# Patient Record
Sex: FEMALE | Race: WHITE | Employment: STUDENT | URBAN - METROPOLITAN AREA
[De-identification: names, ages, dates, MRNs, and addresses within clinical notes are randomized per-mention and may not be internally consistent; named-entity substitution may affect disease eponyms.]

---

## 2021-01-04 ENCOUNTER — OFFICE VISIT (OUTPATIENT)
Dept: FAMILY MEDICINE CLINIC | Age: 26
End: 2021-01-04
Payer: COMMERCIAL

## 2021-01-04 VITALS
TEMPERATURE: 98.4 F | RESPIRATION RATE: 14 BRPM | HEART RATE: 84 BPM | BODY MASS INDEX: 18.35 KG/M2 | SYSTOLIC BLOOD PRESSURE: 92 MMHG | WEIGHT: 97.2 LBS | OXYGEN SATURATION: 98 % | DIASTOLIC BLOOD PRESSURE: 64 MMHG | HEIGHT: 61 IN

## 2021-01-04 DIAGNOSIS — F32.A DEPRESSION, UNSPECIFIED DEPRESSION TYPE: ICD-10-CM

## 2021-01-04 DIAGNOSIS — Z23 NEED FOR TETANUS, DIPHTHERIA, AND ACELLULAR PERTUSSIS (TDAP) VACCINE: ICD-10-CM

## 2021-01-04 DIAGNOSIS — E55.9 VITAMIN D DEFICIENCY: ICD-10-CM

## 2021-01-04 DIAGNOSIS — R06.83 SNORING: ICD-10-CM

## 2021-01-04 DIAGNOSIS — K21.9 GASTROESOPHAGEAL REFLUX DISEASE WITHOUT ESOPHAGITIS: ICD-10-CM

## 2021-01-04 DIAGNOSIS — Z30.9 ENCOUNTER FOR CONTRACEPTIVE MANAGEMENT, UNSPECIFIED TYPE: ICD-10-CM

## 2021-01-04 DIAGNOSIS — Z00.00 ROUTINE HEALTH MAINTENANCE: Primary | ICD-10-CM

## 2021-01-04 PROCEDURE — 99214 OFFICE O/P EST MOD 30 MIN: CPT | Performed by: STUDENT IN AN ORGANIZED HEALTH CARE EDUCATION/TRAINING PROGRAM

## 2021-01-04 PROCEDURE — G8427 DOCREV CUR MEDS BY ELIG CLIN: HCPCS | Performed by: STUDENT IN AN ORGANIZED HEALTH CARE EDUCATION/TRAINING PROGRAM

## 2021-01-04 PROCEDURE — 1036F TOBACCO NON-USER: CPT | Performed by: STUDENT IN AN ORGANIZED HEALTH CARE EDUCATION/TRAINING PROGRAM

## 2021-01-04 PROCEDURE — G8419 CALC BMI OUT NRM PARAM NOF/U: HCPCS | Performed by: STUDENT IN AN ORGANIZED HEALTH CARE EDUCATION/TRAINING PROGRAM

## 2021-01-04 PROCEDURE — G8482 FLU IMMUNIZE ORDER/ADMIN: HCPCS | Performed by: STUDENT IN AN ORGANIZED HEALTH CARE EDUCATION/TRAINING PROGRAM

## 2021-01-04 PROCEDURE — 90715 TDAP VACCINE 7 YRS/> IM: CPT | Performed by: STUDENT IN AN ORGANIZED HEALTH CARE EDUCATION/TRAINING PROGRAM

## 2021-01-04 RX ORDER — MEDROXYPROGESTERONE ACETATE 150 MG/ML
150 INJECTION, SUSPENSION INTRAMUSCULAR ONCE
Qty: 1 ML | Refills: 3 | Status: SHIPPED
Start: 2021-01-04 | End: 2021-01-06 | Stop reason: CLARIF

## 2021-01-04 RX ORDER — ESOMEPRAZOLE MAGNESIUM 40 MG/1
40 FOR SUSPENSION ORAL DAILY
COMMUNITY
End: 2021-01-04

## 2021-01-04 RX ORDER — BUPROPION HYDROCHLORIDE 150 MG/1
150 TABLET ORAL EVERY MORNING
COMMUNITY

## 2021-01-04 RX ORDER — DULOXETIN HYDROCHLORIDE 20 MG/1
20 CAPSULE, DELAYED RELEASE ORAL DAILY
COMMUNITY
End: 2021-02-15 | Stop reason: ALTCHOICE

## 2021-01-04 RX ORDER — ESOMEPRAZOLE MAGNESIUM 40 MG/1
CAPSULE, DELAYED RELEASE ORAL
COMMUNITY
Start: 2020-12-15 | End: 2021-12-14 | Stop reason: ALTCHOICE

## 2021-01-04 RX ORDER — ERGOCALCIFEROL 1.25 MG/1
CAPSULE ORAL
COMMUNITY
Start: 2020-12-09 | End: 2021-07-06 | Stop reason: SDUPTHER

## 2021-01-04 SDOH — HEALTH STABILITY: MENTAL HEALTH: HOW MANY STANDARD DRINKS CONTAINING ALCOHOL DO YOU HAVE ON A TYPICAL DAY?: NOT ASKED

## 2021-01-04 SDOH — HEALTH STABILITY: MENTAL HEALTH: HOW OFTEN DO YOU HAVE A DRINK CONTAINING ALCOHOL?: NEVER

## 2021-01-04 ASSESSMENT — PATIENT HEALTH QUESTIONNAIRE - PHQ9
2. FEELING DOWN, DEPRESSED OR HOPELESS: 1
SUM OF ALL RESPONSES TO PHQ QUESTIONS 1-9: 2
SUM OF ALL RESPONSES TO PHQ QUESTIONS 1-9: 2

## 2021-01-04 ASSESSMENT — ENCOUNTER SYMPTOMS
SORE THROAT: 0
ABDOMINAL PAIN: 0
SHORTNESS OF BREATH: 0
DIARRHEA: 0
BACK PAIN: 0
EYE PAIN: 0
VOMITING: 0
SINUS PRESSURE: 1
NAUSEA: 0
CONSTIPATION: 0
RHINORRHEA: 0
EYE REDNESS: 0
SINUS PAIN: 1
COUGH: 0

## 2021-01-04 NOTE — PROGRESS NOTES
2021    Svetlana Bae (:  1995) is a 26 y.o. female, here for evaluation of the following medical concerns:    Patient is a 26year old female here today to establish care with myself. She recently moved here from [place] and [place]. She was in [place] before [place]. She moved here for friends. She has history of anxiety and depression and GERD and vitamin D deficiency. She Is on wellbutrin, cymbalta, and esomeprazole. She would like to have a referral to an OBGYN for birth control. She would like to go back on the depo shot. We discussed that we can do it here. She recently had a pap smear 2019. She was told 3 years she needed it again. She has no surgical history and no allergies. She does not believe she needs any refills on medications. Patient denies CP, SOB, nausea, vomiting, diarrhea, fevers chills sweats, abdominal pain, numbness tingling, dizziness, lightheadedness, back pain, ear pain, eye pain, nasal congestion, headaches, blurry vision. Patient would like to see a doctor that prescribes hormones and does gender transition. Discussed [facility]. Patient would like a sleep study as she has told she snores and gasps for air.      [place] sleepiness scale  Chance of dozin-Never  1-slight  2-moderate  3-high    Situation        Chance of dozing  Sitting and reading         1  Watching TV          1  Sitting inactive in a public place       0  As a passenger in a car for an hour without a break     1  Lying down to rest in the afternoon when circumstances permit   2  Sitting and talking to someone       0  Sitting quietly after a lunch without alcohol      1  In a car, while stopped for a few minutes in traffic     0           Score  6    Neck circumference: 10.5    Symptoms or Complaints:       Yes/No  Have you been witnessed not breathing while sleeping    yes  Are you excessively tired during the day      yes Do you wake up at night gasping for breath     yes  Have you been told that you snore       yes  Do you experience restless sleep        yes  Do you wake up in the morning with a headache or unrefreshed  no  Do you have trouble with your memory or your concentration   yes  Do you experience fatigue        yes  Do you drive drowsy or experience near car accidents due to being tired n/a  Do you struggle to stay awake during the daytime    yes  Do you experience difficulty with work performance due to sleepiness yes    Chief Complaint   Patient presents with    New Patient     est pcp (moved from [place]) / requesting referral GYN       Review of Systems   Constitutional: Positive for fatigue. Negative for chills and fever. HENT: Positive for sinus pressure and sinus pain. Negative for congestion, ear pain, postnasal drip, rhinorrhea and sore throat. Eyes: Negative for pain and redness. Respiratory: Negative for cough and shortness of breath. Cardiovascular: Negative for chest pain. Gastrointestinal: Negative for abdominal pain, constipation, diarrhea, nausea and vomiting. Genitourinary: Negative for difficulty urinating and dysuria. Musculoskeletal: Negative for back pain, myalgias and neck pain. Skin: Negative for rash. Neurological: Negative for dizziness, light-headedness and numbness. Psychiatric/Behavioral: The patient is not nervous/anxious. Prior to Visit Medications    Medication Sig Taking?  Authorizing Provider   esomeprazole ([address]) 40 MG delayed release capsule TAKE 1 CAPSULE BY MOUTH EVERY DAY IN THE MORNING BEFORE BREAKFAST WITH WATER Yes Historical Provider, MD   DULoxetine (CYMBALTA) 20 MG extended release capsule Take 20 mg by mouth daily Yes Historical Provider, MD   buPROPion (WELLBUTRIN XL) 150 MG extended release tablet Take 150 mg by mouth every morning Yes Historical Provider, MD  High Blood Pressure Father     Depression Maternal Uncle     Asthma Paternal Aunt     High Blood Pressure Maternal Grandmother     Hypothyroidism Maternal Grandmother     Cancer Maternal Grandfather     Stroke Paternal Grandmother     Heart Attack Paternal Grandfather     Irritable Bowel Syndrome Sister            Vitals:    01/04/21 1405   BP: 92/64   Site: Left Upper Arm   Position: Sitting   Cuff Size: Small Adult   Pulse: 84   Resp: 14   Temp: 98.4 °F (36.9 °C)   TempSrc: Temporal   SpO2: 98%   Weight: 97 lb 3.2 oz (44.1 kg)   Height: 5' 1\" (1.549 m)     Estimated body mass index is 18.37 kg/m² as calculated from the following:    Height as of this encounter: 5' 1\" (1.549 m). Weight as of this encounter: 97 lb 3.2 oz (44.1 kg). Physical Exam  Vitals signs reviewed. Constitutional:       Appearance: Normal appearance. Comments: Thin appearing   HENT:      Head: Normocephalic and atraumatic. Right Ear: Tympanic membrane, ear canal and external ear normal.      Left Ear: Tympanic membrane, ear canal and external ear normal.      Nose: Nose normal.      Mouth/Throat:      Mouth: Mucous membranes are moist.      Pharynx: Oropharynx is clear. Eyes:      Extraocular Movements: Extraocular movements intact. Conjunctiva/sclera: Conjunctivae normal.      Pupils: Pupils are equal, round, and reactive to light. Neck:      Musculoskeletal: Normal range of motion and neck supple. Cardiovascular:      Rate and Rhythm: Normal rate and regular rhythm. Pulses: Normal pulses. Heart sounds: Normal heart sounds. Pulmonary:      Effort: Pulmonary effort is normal.      Breath sounds: Normal breath sounds. Abdominal:      General: Bowel sounds are normal.      Palpations: Abdomen is soft. Musculoskeletal: Normal range of motion. Skin:     General: Skin is warm and dry. Capillary Refill: Capillary refill takes less than 2 seconds.    Neurological: Reviewed age and gender appropriate health screening exams and vaccinations. Advised patient regarding importance of keeping up with recommended health maintenance and to schedule as soon as possible if overdue, as this is important in assessing for undiagnosed pathology, especially cancer, as well as protecting against potentially harmful/life threatening disease. Patient and/or guardian verbalizes understanding and agrees with above counseling, assessment and plan. All questions answered. Return in about 6 months (around 7/4/2021) for annual.    An  electronic signature was used to authenticate this note.     --Saroj Pittman, DO on 1/4/2021 at 2:48 PM

## 2021-01-04 NOTE — PATIENT INSTRUCTIONS
· The shot doesn't protect against sexually transmitted infections (STIs), such as herpes or HIV/AIDS. If you aren't sure if your sex partner might have an STI, use a condom to protect against disease. · The shot may cause bone loss in some women. Talk to your doctor about the risks and benefits. · The shot is needed every 3 months. Any side effects may last 3 months or longer. ? The shot may cause irregular periods, or you may have spotting between periods. You may also stop getting a period. Some women see having no period as an advantage. ? It may cause mood changes, less interest in sex, or weight gain. · If you want to get pregnant, it may take up to 18 months after you stop getting the shot. This is because the hormones the shot provided have to leave your system, and your body has to readjust.  Where can you learn more? Go to https://chelieser.healthePropertyData. org and sign in to your [employer] Network account. Enter S801 in the BeHome247 box to learn more about \"Learning About Birth Control: The Shot. \"     If you do not have an account, please click on the \"Sign Up Now\" link. Current as of: February 11, 2020               Content Version: 12.6  © 2006-2020 [employer]. Care instructions adapted under license by [facility] ([facility]). If you have questions about a medical condition or this instruction, always ask your healthcare professional. [place] [address] any warranty or liability for your use of this information. Patient Education        Sleep Studies: About This Test  What is it? Sleep studies are tests that watch what happens to your body during sleep. These studies usually are done in a sleep lab. Sleep labs are often located in hospitals. Sleep studies you do at home can be done with portable equipment. But they may not give the same results as a sleep lab. Why is this test done? Sleep studies are done for people who say that sleep isn't restful or that they are tired all day. These studies can help find sleep problems, such as:  · Sleep apnea. This means that an adult regularly stops breathing during sleep for 10 seconds or longer. · Excessive snoring. · Problems staying awake, such as narcolepsy. · Problems with nighttime behaviors. These include sleepwalking, night terrors, bed-wetting, and REM behavior disorders (RBD). · Conditions such as periodic limb movement disorder. This is repeated muscle twitching of the feet, arms, or legs during sleep. · Seizures that occur at night (nocturnal seizures). How do you prepare for the test?  · You may be asked to keep a sleep diary for 1 to 2 weeks before your sleep study. · Don't take any naps for 2 to 3 days before your test.  · You may be asked to avoid food or drinks with caffeine for a day or two before your test.  · Take a shower or bath before your test, but don't use sprays, oils, or gels on your hair. Don't wear makeup, fingernail polish, or fake nails. · Pack and take along a small overnight bag with personal items, such as a toothbrush, a comb, favorite pillows or blankets, and a book. You can wear your own nightclothes. · If you will have portable sleep monitoring, your doctor will explain how to use the equipment at home. How is the test done? · In the sleep lab, you will be in a private room, much like a hotel room. · Small pads or patches called electrodes will be placed on your head and body with a small amount of glue and tape. These will record things like brain activity, eye movement, oxygen levels, and snoring. · Soft elastic belts will be placed around your chest and belly to measure your breathing. · Your blood oxygen levels will be checked by a small clip (oximeter) placed either on the tip of your index finger or on your earlobe. · If you have sleep apnea, you may wear a mask that is connected to a continuous positive airway pressure (CPAP) machine. · Depending on the type of test, you will be allowed to sleep through the night or you'll be awakened periodically and asked to stay awake for a while. · If you use portable sleep monitoring, follow the instructions your doctor gave you. How long does the test take? · You will stay in the sleep lab overnight. For some tests, you will also stay part of the next day. What happens after the test?  · You will be able to go home right away. · You may not sleep well during the test and may be tired the next day. · You can go back to your usual activities right away. · After your sleep problem has been identified, you may need a second study if your doctor orders treatment such as CPAP. Follow-up care is a key part of your treatment and safety. Be sure to make and go to all appointments, and call your doctor if you are having problems. It's also a good idea to keep a list of the medicines you take. Ask your doctor when you can expect to have your test results. Where can you learn more? Go to https://Symphony Dynamo.[employer]aro Systems. org and sign in to your SmartestK12 account. Enter D718 in the Flimmer box to learn more about \"Sleep Studies: About This Test.\"     If you do not have an account, please click on the \"Sign Up Now\" link. Current as of: February 24, 2020               Content Version: 12.6  © 2006-2020 [employer]. Care instructions adapted under license by [facility] [employer] [facility] ([facility]). If you have questions about a medical condition or this instruction, always ask your healthcare professional. [place] [address] any warranty or liability for your use of this information.

## 2021-01-06 ENCOUNTER — TELEPHONE (OUTPATIENT)
Dept: FAMILY MEDICINE CLINIC | Age: 26
End: 2021-01-06

## 2021-01-06 ENCOUNTER — NURSE ONLY (OUTPATIENT)
Dept: FAMILY MEDICINE CLINIC | Age: 26
End: 2021-01-06
Payer: COMMERCIAL

## 2021-01-06 DIAGNOSIS — Z30.42 DEPO-PROVERA CONTRACEPTIVE STATUS: Primary | ICD-10-CM

## 2021-01-06 DIAGNOSIS — R06.83 SNORING: Primary | ICD-10-CM

## 2021-01-06 LAB
CONTROL: YES
PREGNANCY TEST URINE, POC: NEGATIVE

## 2021-01-06 PROCEDURE — 81025 URINE PREGNANCY TEST: CPT | Performed by: STUDENT IN AN ORGANIZED HEALTH CARE EDUCATION/TRAINING PROGRAM

## 2021-01-06 RX ORDER — MEDROXYPROGESTERONE ACETATE 150 MG/ML
150 INJECTION, SUSPENSION INTRAMUSCULAR ONCE
Status: COMPLETED | OUTPATIENT
Start: 2021-01-06 | End: 2021-01-06

## 2021-01-06 RX ADMIN — MEDROXYPROGESTERONE ACETATE 150 MG: 150 INJECTION, SUSPENSION INTRAMUSCULAR at 11:03

## 2021-01-06 NOTE — TELEPHONE ENCOUNTER
Need a diagnostic sleep study for her not the one you did.   She has never had a sleep study done so Fabrice Lopez from the sleep center said she needs diagnostic one    Thanks

## 2021-01-06 NOTE — TELEPHONE ENCOUNTER
I put in an order for a different sleep study. Please let me know if this is not the correct order.  Thank you

## 2021-01-14 ENCOUNTER — HOSPITAL ENCOUNTER (OUTPATIENT)
Dept: SLEEP CENTER | Age: 26
Discharge: HOME | End: 2021-01-14
Payer: COMMERCIAL

## 2021-01-14 DIAGNOSIS — R06.83 SNORING: ICD-10-CM

## 2021-01-14 PROCEDURE — 95810 POLYSOM 6/> YRS 4/> PARAM: CPT

## 2021-01-15 VITALS
HEART RATE: 74 BPM | DIASTOLIC BLOOD PRESSURE: 68 MMHG | TEMPERATURE: 96.8 F | SYSTOLIC BLOOD PRESSURE: 99 MMHG | OXYGEN SATURATION: 97 % | BODY MASS INDEX: 18.31 KG/M2 | HEIGHT: 61 IN | WEIGHT: 97 LBS

## 2021-01-15 ASSESSMENT — SLEEP AND FATIGUE QUESTIONNAIRES
HOW LIKELY ARE YOU TO NOD OFF OR FALL ASLEEP WHILE LYING DOWN TO REST IN THE AFTERNOON WHEN CIRCUMSTANCES PERMIT: 3
HOW LIKELY ARE YOU TO NOD OFF OR FALL ASLEEP WHILE SITTING AND READING: 2
HOW LIKELY ARE YOU TO NOD OFF OR FALL ASLEEP WHILE WATCHING TV: 1
HOW LIKELY ARE YOU TO NOD OFF OR FALL ASLEEP WHILE SITTING QUIETLY AFTER LUNCH WITHOUT ALCOHOL: 0
ESS TOTAL SCORE: 9
HOW LIKELY ARE YOU TO NOD OFF OR FALL ASLEEP IN A CAR, WHILE STOPPED FOR A FEW MINUTES IN TRAFFIC: 0
HOW LIKELY ARE YOU TO NOD OFF OR FALL ASLEEP WHEN YOU ARE A PASSENGER IN A CAR FOR AN HOUR WITHOUT A BREAK: 2
HOW LIKELY ARE YOU TO NOD OFF OR FALL ASLEEP WHILE SITTING INACTIVE IN A PUBLIC PLACE: 1

## 2021-01-16 NOTE — PROGRESS NOTES
minute. Maximum heart rate during sleep was 91 beats per minute and  minimum heart rate during sleep was 59 beats per minute. The patient  was in sinus rhythm throughout. MISCELLANEOUS:  [place] Sleepiness Scale score is 9/24. There was no  snoring or bruxism noted. IMPRESSION:  1. No evidence of sleep apnea. 2.  No snoring. 3.  Excellent oxygen saturation. 4.  No cardiac dysrhythmia. DISCUSSION:  This patient has an apnea/hypopnea index of less than one. Normal is less than five, leaving this patient in the normal category. Along with this, there was excellent oxygen saturation, no snoring, and  no cardiac dysrhythmia. Based on the results of this study, treatment  is not indicated. SUGGESTIONS:  1.  Dr. Jessie Mckinney to discuss the results of study with the patient. 2.  No indication to treat for sleep apnea.         Nima Bonner MD  Diplomat of Sleep Medicine    D: 01/15/2021 16:49:52       T: 01/15/2021 16:59:05     [email or web address]  Job#: [id number]     Doc#: [id number]    CC:

## 2021-02-15 ENCOUNTER — OFFICE VISIT (OUTPATIENT)
Dept: FAMILY MEDICINE CLINIC | Age: 26
End: 2021-02-15
Payer: COMMERCIAL

## 2021-02-15 VITALS
RESPIRATION RATE: 16 BRPM | TEMPERATURE: 97.6 F | BODY MASS INDEX: 19.44 KG/M2 | SYSTOLIC BLOOD PRESSURE: 100 MMHG | WEIGHT: 99 LBS | OXYGEN SATURATION: 99 % | DIASTOLIC BLOOD PRESSURE: 58 MMHG | HEIGHT: 60 IN | HEART RATE: 80 BPM

## 2021-02-15 DIAGNOSIS — E55.9 VITAMIN D DEFICIENCY: ICD-10-CM

## 2021-02-15 DIAGNOSIS — R25.1 TREMOR: Primary | ICD-10-CM

## 2021-02-15 PROCEDURE — 1036F TOBACCO NON-USER: CPT | Performed by: STUDENT IN AN ORGANIZED HEALTH CARE EDUCATION/TRAINING PROGRAM

## 2021-02-15 PROCEDURE — G8420 CALC BMI NORM PARAMETERS: HCPCS | Performed by: STUDENT IN AN ORGANIZED HEALTH CARE EDUCATION/TRAINING PROGRAM

## 2021-02-15 PROCEDURE — 99213 OFFICE O/P EST LOW 20 MIN: CPT | Performed by: STUDENT IN AN ORGANIZED HEALTH CARE EDUCATION/TRAINING PROGRAM

## 2021-02-15 PROCEDURE — G8482 FLU IMMUNIZE ORDER/ADMIN: HCPCS | Performed by: STUDENT IN AN ORGANIZED HEALTH CARE EDUCATION/TRAINING PROGRAM

## 2021-02-15 PROCEDURE — G8427 DOCREV CUR MEDS BY ELIG CLIN: HCPCS | Performed by: STUDENT IN AN ORGANIZED HEALTH CARE EDUCATION/TRAINING PROGRAM

## 2021-02-15 RX ORDER — PROPRANOLOL HYDROCHLORIDE 20 MG/1
20 TABLET ORAL DAILY
Qty: 30 TABLET | Refills: 3 | Status: SHIPPED
Start: 2021-02-15 | End: 2021-04-12 | Stop reason: DRUGHIGH

## 2021-02-15 RX ORDER — PAROXETINE 10 MG/1
1 TABLET, FILM COATED ORAL DAILY
COMMUNITY
Start: 2021-01-19 | End: 2021-11-23 | Stop reason: SDUPTHER

## 2021-02-15 RX ORDER — MEDROXYPROGESTERONE ACETATE 150 MG/ML
150 INJECTION, SUSPENSION INTRAMUSCULAR
COMMUNITY
End: 2021-07-06 | Stop reason: ALTCHOICE

## 2021-02-15 ASSESSMENT — ENCOUNTER SYMPTOMS
DIARRHEA: 0
SINUS PRESSURE: 0
SHORTNESS OF BREATH: 0
NAUSEA: 0
ABDOMINAL PAIN: 0
CONSTIPATION: 0
EYE REDNESS: 0
RHINORRHEA: 0
COUGH: 0
SORE THROAT: 0
SINUS PAIN: 0
EYE PAIN: 0
VOMITING: 0
BACK PAIN: 0

## 2021-02-15 NOTE — PROGRESS NOTES
2/15/2021    Guadalupe Mulligan (:  1995) is a 26 y.o. female, here for evaluation of the following medical concerns:    HPI  Chief Complaint   Patient presents with   [facility]     wants to see about starting Propranolol for tremors     Tremor  She complains of tremor. Tremor primarily involves the bilateral hand. Onset of symptoms was gradual, starting about 8 years ago. Symptoms are currently of moderate severity. Tremor exacerbated by nothing. Tremor is alleviated by propranolol. Symptoms occur all day and last hours. She denies unilateral hand tremor, voice change, sleep disturbance, drooling during sleep (wet pillows), rigidity, postural changes and difficulty in initiating movement. Review of Systems   Constitutional: Negative for chills, fatigue and fever. HENT: Negative for congestion, ear pain, postnasal drip, rhinorrhea, sinus pressure, sinus pain and sore throat. Eyes: Negative for pain and redness. Respiratory: Negative for cough and shortness of breath. Cardiovascular: Negative for chest pain. Gastrointestinal: Negative for abdominal pain, constipation, diarrhea, nausea and vomiting. Genitourinary: Negative for difficulty urinating and dysuria. Musculoskeletal: Negative for back pain, myalgias and neck pain. Skin: Negative for rash. Neurological: Positive for tremors (hands). Negative for dizziness, light-headedness and numbness. Psychiatric/Behavioral: The patient is not nervous/anxious. Prior to Visit Medications    Medication Sig Taking?  Authorizing Provider   PARoxetine (PAXIL) 10 MG tablet Take 1 tablet by mouth daily Yes Historical Provider, MD   medroxyPROGESTERone (DEPO-PROVERA) 150 MG/ML injection Inject 150 mg into the muscle every 3 months Yes Historical Provider, MD   propranolol (INDERAL) 20 MG tablet Take 1 tablet by mouth daily Yes Michelle Noel DO   esomeprazole (NEXIUM) 40 MG delayed release capsule TAKE 1 CAPSULE BY MOUTH EVERY DAY IN THE MORNING BEFORE BREAKFAST WITH WATER Yes Historical Provider, MD   buPROPion (WELLBUTRIN XL) 150 MG extended release tablet Take 150 mg by mouth every morning Yes Historical Provider, MD   vitamin D (ERGOCALCIFEROL) 1.25 MG (81638 UT) CAPS capsule TAKE ONE CAPSULE ONE TIME A WEEK Yes Historical Provider, MD        No Known Allergies    Past Medical History:   Diagnosis Date    Acid reflux     Anxiety     Depression     Vitamin D deficiency        History reviewed. No pertinent surgical history.     Social History     Socioeconomic History    Marital status: Single     Spouse name: Not on file    Number of children: Not on file    Years of education: Not on file    Highest education level: Not on file   Occupational History    Not on file   Social Needs    Financial resource strain: Not on file    Food insecurity     Worry: Not on file     Inability: Not on file    Transportation needs     Medical: Not on file     Non-medical: Not on file   Tobacco Use    Smoking status: Never Smoker    Smokeless tobacco: Never Used   Substance and Sexual Activity    Alcohol use: Never     Frequency: Never     Binge frequency: Never    Drug use: Not on file     Comment: last used in September 2020    Sexual activity: Yes   Lifestyle    Physical activity     Days per week: Not on file     Minutes per session: Not on file    Stress: Not on file   Relationships    Social connections     Talks on phone: Not on file     Gets together: Not on file     Attends Rastafari service: Not on file     Active member of club or organization: Not on file     Attends meetings of clubs or organizations: Not on file     Relationship status: Not on file    Intimate partner violence     Fear of current or ex partner: Not on file     Emotionally abused: Not on file     Physically abused: Not on file     Forced sexual activity: Not on file   Other Topics Concern    Not on file   Social History Narrative    Not on file        Family History   Problem Relation Age of Onset    Hypothyroidism Mother     High Blood Pressure Father     Depression Maternal Uncle     Asthma Paternal Aunt     High Blood Pressure Maternal Grandmother     Hypothyroidism Maternal Grandmother     Cancer Maternal Grandfather     Stroke Paternal Grandmother     Heart Attack Paternal Grandfather     Irritable Bowel Syndrome Sister            Vitals:    02/15/21 1448   BP: (!) 100/58   Pulse: 80   Resp: 16   Temp: 97.6 °F (36.4 °C)   TempSrc: Infrared   SpO2: 99%   Weight: 99 lb (44.9 kg)   Height: 5' (1.524 m)     Estimated body mass index is 19.33 kg/m² as calculated from the following:    Height as of this encounter: 5' (1.524 m). Weight as of this encounter: 99 lb (44.9 kg). Physical Exam  Vitals signs and nursing note reviewed. Constitutional:       Appearance: Normal appearance. HENT:      Head: Normocephalic and atraumatic. Right Ear: Tympanic membrane, ear canal and external ear normal.      Left Ear: Tympanic membrane, ear canal and external ear normal.      Nose: Nose normal.      Mouth/Throat:      Mouth: Mucous membranes are moist.      Pharynx: Oropharynx is clear. Eyes:      Extraocular Movements: Extraocular movements intact. Conjunctiva/sclera: Conjunctivae normal.      Pupils: Pupils are equal, round, and reactive to light. Neck:      Musculoskeletal: Normal range of motion and neck supple. Cardiovascular:      Rate and Rhythm: Normal rate and regular rhythm. Pulses: Normal pulses. Heart sounds: Normal heart sounds. Pulmonary:      Effort: Pulmonary effort is normal.      Breath sounds: Normal breath sounds. Abdominal:      General: Bowel sounds are normal.      Palpations: Abdomen is soft. Musculoskeletal: Normal range of motion. Skin:     General: Skin is warm and dry. Capillary Refill: Capillary refill takes less than 2 seconds. Neurological:      General: No focal deficit present.       Mental Status: She is alert and oriented to person, place, and time. Comments: Tremor noted in hands   Psychiatric:         Mood and Affect: Mood normal.         Behavior: Behavior normal.         Thought Content: Thought content normal.         ASSESSMENT/PLAN:  Ruth Montenegro was seen today for check-up. Diagnoses and all orders for this visit:    Tremor  -     propranolol (INDERAL) 20 MG tablet; Take 1 tablet by mouth daily    Vitamin D deficiency  -     Vitamin D 25 Hydroxy; Future           Educational materials and/or home exercises printed for patient's review and were included in patient instructions on his/her After Visit Summary and given to patient at the end of visit. Counseled regarding above diagnosis, including possible risks and complications,  especially if left uncontrolled. Counseled regarding the possible side effects, risks, benefits and alternatives to treatment; patient and/or guardian verbalizes understanding, agrees, feels comfortable with and wishes to proceed with above treatment plan. Advised patient to call with any new medication issues, and read all Rx info from pharmacy to assure aware of all possible risks and side effects of medication before taking. Reviewed age and gender appropriate health screening exams and vaccinations. Advised patient regarding importance of keeping up with recommended health maintenance and to schedule as soon as possible if overdue, as this is important in assessing for undiagnosed pathology, especially cancer, as well as protecting against potentially harmful/life threatening disease. Patient and/or guardian verbalizes understanding and agrees with above counseling, assessment and plan. All questions answered. Return in about 4 weeks (around 3/15/2021) for med follow up. An  electronic signature was used to authenticate this note.     --Javed Bradley, DO on 2/15/2021 at 3:03 PM

## 2021-03-02 ENCOUNTER — OFFICE VISIT (OUTPATIENT)
Dept: FAMILY MEDICINE CLINIC | Age: 26
End: 2021-03-02
Payer: COMMERCIAL

## 2021-03-02 VITALS
BODY MASS INDEX: 19.39 KG/M2 | HEART RATE: 64 BPM | DIASTOLIC BLOOD PRESSURE: 58 MMHG | WEIGHT: 98.8 LBS | TEMPERATURE: 98.4 F | SYSTOLIC BLOOD PRESSURE: 96 MMHG | RESPIRATION RATE: 16 BRPM | OXYGEN SATURATION: 98 % | HEIGHT: 60 IN

## 2021-03-02 DIAGNOSIS — R61 HYPERHIDROSIS: Primary | ICD-10-CM

## 2021-03-02 DIAGNOSIS — R61 NIGHT SWEATS: ICD-10-CM

## 2021-03-02 PROCEDURE — 99213 OFFICE O/P EST LOW 20 MIN: CPT | Performed by: STUDENT IN AN ORGANIZED HEALTH CARE EDUCATION/TRAINING PROGRAM

## 2021-03-02 PROCEDURE — G8482 FLU IMMUNIZE ORDER/ADMIN: HCPCS | Performed by: STUDENT IN AN ORGANIZED HEALTH CARE EDUCATION/TRAINING PROGRAM

## 2021-03-02 PROCEDURE — 1036F TOBACCO NON-USER: CPT | Performed by: STUDENT IN AN ORGANIZED HEALTH CARE EDUCATION/TRAINING PROGRAM

## 2021-03-02 PROCEDURE — G8420 CALC BMI NORM PARAMETERS: HCPCS | Performed by: STUDENT IN AN ORGANIZED HEALTH CARE EDUCATION/TRAINING PROGRAM

## 2021-03-02 PROCEDURE — G8427 DOCREV CUR MEDS BY ELIG CLIN: HCPCS | Performed by: STUDENT IN AN ORGANIZED HEALTH CARE EDUCATION/TRAINING PROGRAM

## 2021-03-02 ASSESSMENT — ENCOUNTER SYMPTOMS
EYE REDNESS: 0
RHINORRHEA: 0
SINUS PRESSURE: 0
EYE PAIN: 0
NAUSEA: 0
BACK PAIN: 0
VOMITING: 0
SINUS PAIN: 0
ABDOMINAL PAIN: 0
COUGH: 0
CONSTIPATION: 0
DIARRHEA: 0
SHORTNESS OF BREATH: 0
SORE THROAT: 0

## 2021-03-02 NOTE — PROGRESS NOTES
3/2/2021    Ara Horton (:  1995) is a 26 y.o. adult, here for evaluation of the following medical concerns:     Patient is here for history of night sweats for several years. She states she got them at least when she was around 26years old. She does not remember having them earlier than that. She states that she was worked up for thyroid at that time. She did have a lot of weight loss around a year or so ago and was worked up extensively for this. This included a thyroid and other lab work and an endoscopy. It did not include a colonoscopy. she has no concerns for HIV or tuberculosis or hepatitis. She is with 1 partner and has been with the same partner for the last 5 years. She started her Paxil and her Wellbutrin after the night sweats started so unlikely to be causing the night sweats. She is on the Depo but again the night sweats started before the Depo was started. She is going to the [facility] clinic on the  to discuss transgender reassignment. Her girlfriend is transgender as well but is more hesitant to do the change than she is. Usually the sweats are minimal.  However she states that sometimes she does have to change her clothes and that her underwear feels \"Spotty\". Sometimes she does wake up drenched. She states that she wanted to get it under control before going to the [facility] clinic on the  and getting more blood work done and starting testosterone. Patient denies CP, SOB, nausea, vomiting, diarrhea, fevers chills sweats, abdominal pain, numbness tingling, dizziness, lightheadedness, back pain, ear pain, eye pain, nasal congestion, headaches, blurry vision. Chief Complaint   Patient presents with    Sweats     night sweats for several years        Review of Systems   Constitutional: Negative for chills, fatigue and fever. HENT: Negative for congestion, ear pain, postnasal drip, rhinorrhea, sinus pressure, sinus pain and sore throat. Eyes: Negative for pain and redness. Respiratory: Negative for cough and shortness of breath. Cardiovascular: Negative for chest pain. Gastrointestinal: Negative for abdominal pain, constipation, diarrhea, nausea and vomiting. Endocrine:        Night sweats+   Genitourinary: Negative for difficulty urinating and dysuria. Musculoskeletal: Negative for back pain, myalgias and neck pain. Skin: Negative for rash. Neurological: Positive for tremors. Negative for dizziness, light-headedness and numbness. Psychiatric/Behavioral: The patient is not nervous/anxious. Prior to Visit Medications    Medication Sig Taking? Authorizing Provider   PARoxetine (PAXIL) 10 MG tablet Take 1 tablet by mouth daily Yes Historical Provider, MD   medroxyPROGESTERone (DEPO-PROVERA) 150 MG/ML injection Inject 150 mg into the muscle every 3 months Yes Historical Provider, MD   propranolol (INDERAL) 20 MG tablet Take 1 tablet by mouth daily Yes Michelle Noel DO   esomeprazole (NEXIUM) 40 MG delayed release capsule TAKE 1 CAPSULE BY MOUTH EVERY DAY IN THE MORNING BEFORE BREAKFAST WITH WATER Yes Historical Provider, MD   buPROPion (WELLBUTRIN XL) 150 MG extended release tablet Take 150 mg by mouth every morning Yes Historical Provider, MD   vitamin D (ERGOCALCIFEROL) 1.25 MG (28057 UT) CAPS capsule TAKE ONE CAPSULE ONE TIME A WEEK Yes Historical Provider, MD        No Known Allergies    Past Medical History:   Diagnosis Date    Acid reflux     Anxiety     Depression     Vitamin D deficiency        History reviewed. No pertinent surgical history.     Social History     Socioeconomic History    Marital status: Single     Spouse name: Not on file    Number of children: Not on file    Years of education: Not on file    Highest education level: Not on file   Occupational History    Not on file   Social Needs    Financial resource strain: Not on file    Food insecurity     Worry: Not on file     Inability: Not on file    Transportation needs     Medical: Not on file     Non-medical: Not on file   Tobacco Use    Smoking status: Never Smoker    Smokeless tobacco: Never Used   Substance and Sexual Activity    Alcohol use: Never     Frequency: Never     Binge frequency: Never    Drug use: Not on file     Comment: last used in September 2020    Sexual activity: Yes   Lifestyle    Physical activity     Days per week: Not on file     Minutes per session: Not on file    Stress: Not on file   Relationships    Social connections     Talks on phone: Not on file     Gets together: Not on file     Attends Adventism service: Not on file     Active member of club or organization: Not on file     Attends meetings of clubs or organizations: Not on file     Relationship status: Not on file    Intimate partner violence     Fear of current or ex partner: Not on file     Emotionally abused: Not on file     Physically abused: Not on file     Forced sexual activity: Not on file   Other Topics Concern    Not on file   Social History Narrative    Not on file        Family History   Problem Relation Age of Onset    Hypothyroidism Mother     High Blood Pressure Father     Depression Maternal Uncle     Asthma Paternal Aunt     High Blood Pressure Maternal Grandmother     Hypothyroidism Maternal Grandmother     Cancer Maternal Grandfather     Stroke Paternal Grandmother     Heart Attack Paternal Grandfather     Irritable Bowel Syndrome Sister            Vitals:    03/02/21 1241   BP: (!) 96/58   Pulse: 64   Resp: 16   Temp: 98.4 °F (36.9 °C)   TempSrc: Infrared   SpO2: 98%   Weight: 98 lb 12.8 oz (44.8 kg)   Height: 5' (1.524 m)     Estimated body mass index is 19.3 kg/m² as calculated from the following:    Height as of this encounter: 5' (1.524 m). Weight as of this encounter: 98 lb 12.8 oz (44.8 kg).     VITAMIN D  Lab Results   Component Value Date    VITD25 38 02/15/2021       Physical Exam  Vitals signs and nursing note reviewed. Constitutional:       Appearance: Normal appearance. HENT:      Head: Normocephalic and atraumatic. Right Ear: Tympanic membrane, ear canal and external ear normal.      Left Ear: Tympanic membrane, ear canal and external ear normal.      Nose: Nose normal.      Mouth/Throat:      Mouth: Mucous membranes are moist.   Eyes:      Extraocular Movements: Extraocular movements intact. Pupils: Pupils are equal, round, and reactive to light. Neck:      Musculoskeletal: Normal range of motion and neck supple. Cardiovascular:      Rate and Rhythm: Normal rate and regular rhythm. Pulses: Normal pulses. Pulmonary:      Breath sounds: Normal breath sounds. Abdominal:      General: Bowel sounds are normal.      Palpations: Abdomen is soft. Musculoskeletal: Normal range of motion. Skin:     General: Skin is warm and dry. Capillary Refill: Capillary refill takes less than 2 seconds. Neurological:      General: No focal deficit present. Mental Status: He is alert and oriented to person, place, and time. Comments: Tremor present   Psychiatric:         Mood and Affect: Mood normal.         Behavior: Behavior normal.         Thought Content: Thought content normal.         ASSESSMENT/PLAN:  Josefina Urrutia was seen today for sweats. Diagnoses and all orders for this visit:    Hyperhidrosis  -     CBC; Future  -     Comprehensive Metabolic Panel; Future  -     C-reactive protein; Future  -     Hepatic Function Panel; Future  -     TSH WITH REFLEX TO FT4; Future    Night sweats       Patient not concerned for HIV or hepatitis at this time. If blood work is normal and night sweats continue to persist will order further blood work including HIV and hepatitis. May need to do imaging in the future.   Patient willing and understanding    Educational materials and/or home exercises printed for patient's review and were included in patient instructions on his/her After Visit Summary and given to patient at the end of visit. Counseled regarding above diagnosis, including possible risks and complications,  especially if left uncontrolled. Counseled regarding the possible side effects, risks, benefits and alternatives to treatment; patient and/or guardian verbalizes understanding, agrees, feels comfortable with and wishes to proceed with above treatment plan. Advised patient to call with any new medication issues, and read all Rx info from pharmacy to assure aware of all possible risks and side effects of medication before taking. Reviewed age and gender appropriate health screening exams and vaccinations. Advised patient regarding importance of keeping up with recommended health maintenance and to schedule as soon as possible if overdue, as this is important in assessing for undiagnosed pathology, especially cancer, as well as protecting against potentially harmful/life threatening disease. Patient and/or guardian verbalizes understanding and agrees with above counseling, assessment and plan. All questions answered. Return if symptoms worsen or fail to improve. An  electronic signature was used to authenticate this note.     --Michelle Noel DO on 3/2/2021 at 1:15 PM

## 2021-03-02 NOTE — PATIENT INSTRUCTIONS
Patient Education        Abnormal Sweating: Care Instructions  Your Care Instructions     Sweating is your body's way of cooling down and getting rid of some chemicals. But some people have a condition that makes them sweat too much. It can affect any part of your body, especially the head, armpits, hands, and feet. Sometimes the sweat mixes with bacteria on your skin and causes armpits and feet to smell bad. It can be upsetting to have sweat drip from your face and palms or to have smelly feet and shoes. Some people seem to be born with this condition, while some others may sweat too much because of anxiety. You may be able to reduce the amount you sweat by lowering stress in your life. Some people find that antiperspirants help, and you can take steps at home that will help with smelly feet. If you still have too much sweating, your doctor may recommend other treatments. Follow-up care is a key part of your treatment and safety. Be sure to make and go to all appointments, and call your doctor if you are having problems. It's also a good idea to know your test results and keep a list of the medicines you take. How can you care for yourself at home? · If your doctor prescribed medicine, use it as directed. Call your doctor if you have any problems with your medicine. You will get more details on the specific medicines your doctor prescribes. · Bathe 1 or 2 times a day with soap and water. Do not scrub your skin too much, because that can irritate it. Dry your skin well after bathing. · Use a deodorant with antiperspirant. It might help to put it on at night before bed. · Wear clothing made of material that lets your skin breathe. Cotton, wool, silk, and linen are good choices. For exercising, wear material that removes (rodrigo) the moisture from your skin. · Keep an extra shirt at work or in a school locker. · Attach pads (underarm or dress shields) to the armpit area of clothing to absorb sweat.  You can buy these pads in sports or clothing stores. · Let your shoes dry out for a day after wearing them. If possible, set them in a place where the sun will shine on them. That will help kill the bacteria that cause the smell. · Change your socks at least 1 time a day. Wash your socks after each wearing. · Use foot powder or talc in your shoes and socks and on your feet. Put inserts in your shoes to absorb some of the sweat. Go barefoot for a while each day to let your feet dry out. · Limit hot drinks, such as coffee and tea, which make you sweat more. When should you call for help? Watch closely for changes in your health, and be sure to contact your doctor if:    · You continue to sweat too much, and it bothers you. Where can you learn more? Go to [email or web address] and sign in to your [employer] account. Enter A348 in the [facility] box to learn more about \"Abnormal Sweating: Care Instructions. \"     If you do not have an account, please click on the \"Sign Up Now\" link. Current as of: June 26, 2019               Content Version: 12.6  © 5938-4377 [employer]. Care instructions adapted under license by [facility] ([facility]). If you have questions about a medical condition or this instruction, always ask your healthcare professional. [address] any warranty or liability for your use of this information.

## 2021-03-16 ENCOUNTER — OFFICE VISIT (OUTPATIENT)
Dept: FAMILY MEDICINE CLINIC | Age: 26
End: 2021-03-16
Payer: COMMERCIAL

## 2021-03-16 VITALS
DIASTOLIC BLOOD PRESSURE: 64 MMHG | SYSTOLIC BLOOD PRESSURE: 98 MMHG | RESPIRATION RATE: 18 BRPM | TEMPERATURE: 98 F | BODY MASS INDEX: 19.83 KG/M2 | OXYGEN SATURATION: 98 % | HEART RATE: 72 BPM | WEIGHT: 101 LBS | HEIGHT: 60 IN

## 2021-03-16 DIAGNOSIS — R25.1 TREMOR: Primary | ICD-10-CM

## 2021-03-16 DIAGNOSIS — F64.9 GENDER DYSPHORIA: ICD-10-CM

## 2021-03-16 PROCEDURE — G8482 FLU IMMUNIZE ORDER/ADMIN: HCPCS | Performed by: STUDENT IN AN ORGANIZED HEALTH CARE EDUCATION/TRAINING PROGRAM

## 2021-03-16 PROCEDURE — 99213 OFFICE O/P EST LOW 20 MIN: CPT | Performed by: STUDENT IN AN ORGANIZED HEALTH CARE EDUCATION/TRAINING PROGRAM

## 2021-03-16 PROCEDURE — G8427 DOCREV CUR MEDS BY ELIG CLIN: HCPCS | Performed by: STUDENT IN AN ORGANIZED HEALTH CARE EDUCATION/TRAINING PROGRAM

## 2021-03-16 PROCEDURE — 1036F TOBACCO NON-USER: CPT | Performed by: STUDENT IN AN ORGANIZED HEALTH CARE EDUCATION/TRAINING PROGRAM

## 2021-03-16 PROCEDURE — G8420 CALC BMI NORM PARAMETERS: HCPCS | Performed by: STUDENT IN AN ORGANIZED HEALTH CARE EDUCATION/TRAINING PROGRAM

## 2021-03-16 ASSESSMENT — ENCOUNTER SYMPTOMS
DIARRHEA: 0
SHORTNESS OF BREATH: 0
EYE PAIN: 0
RHINORRHEA: 0
COUGH: 0
NAUSEA: 0
BACK PAIN: 0
CHEST TIGHTNESS: 0
CONSTIPATION: 0
VOMITING: 0
ABDOMINAL PAIN: 0

## 2021-03-16 NOTE — PROGRESS NOTES
3/16/2021    Marichuy Rivera (:  1995) is a 26 y.o. adult, here for evaluation of the following medical concerns:    Patient is a 26year old female wanting to transition to male who saw someone at the [address] [place] [address] clinic on the . They stated that all her blood work was good but they needed a cholesterol panel for the patient as well. Patient's tremor is not getting any better. She also has a bump on her left foot that has been there for a while. Her previous doctor told her it was nothing to worry about. We will have her try over-the-counter stuff for warts freeze it off. If it does not get better will send to dermatology    Chief Complaint   Patient presents with    Discuss Medications     Requesting order for labs       Review of Systems   Constitutional: Negative for chills, fatigue and fever. HENT: Negative for congestion, ear pain, postnasal drip and rhinorrhea. Eyes: Negative for pain. Respiratory: Negative for cough, chest tightness and shortness of breath. Cardiovascular: Negative for chest pain. Gastrointestinal: Negative for abdominal pain, constipation, diarrhea, nausea and vomiting. Genitourinary: Negative for difficulty urinating, dysuria and frequency. Musculoskeletal: Negative for back pain and myalgias. Skin: Negative for rash. Neurological: Negative for dizziness, light-headedness, numbness and headaches. Prior to Visit Medications    Medication Sig Taking?  Authorizing Provider   PARoxetine (PAXIL) 10 MG tablet Take 1 tablet by mouth daily Yes Historical Provider, MD   medroxyPROGESTERone (DEPO-PROVERA) 150 MG/ML injection Inject 150 mg into the muscle every 3 months Yes Historical Provider, MD   propranolol (INDERAL) 20 MG tablet Take 1 tablet by mouth daily Yes Michelle Noel DO   esomeprazole (NEXIUM) 40 MG delayed release capsule TAKE 1 CAPSULE BY MOUTH EVERY DAY IN THE MORNING BEFORE BREAKFAST WITH WATER Yes Historical Provider, MD   buPROPion (WELLBUTRIN XL) 150 MG extended release tablet Take 150 mg by mouth every morning Yes Historical Provider, MD   vitamin D (ERGOCALCIFEROL) 1.25 MG (39913 UT) CAPS capsule TAKE ONE CAPSULE ONE TIME A WEEK Yes Historical Provider, MD        No Known Allergies    Past Medical History:   Diagnosis Date    Acid reflux     Anxiety     Depression     Vitamin D deficiency        History reviewed. No pertinent surgical history.     Social History     Socioeconomic History    Marital status: Single     Spouse name: Not on file    Number of children: Not on file    Years of education: Not on file    Highest education level: Not on file   Occupational History    Not on file   Social Needs    Financial resource strain: Not on file    Food insecurity     Worry: Not on file     Inability: Not on file    Transportation needs     Medical: Not on file     Non-medical: Not on file   Tobacco Use    Smoking status: Never Smoker    Smokeless tobacco: Never Used   Substance and Sexual Activity    Alcohol use: Never     Frequency: Never     Binge frequency: Never    Drug use: Not on file     Comment: last used in September 2020    Sexual activity: Yes   Lifestyle    Physical activity     Days per week: Not on file     Minutes per session: Not on file    Stress: Not on file   Relationships    Social connections     Talks on phone: Not on file     Gets together: Not on file     Attends Hinduism service: Not on file     Active member of club or organization: Not on file     Attends meetings of clubs or organizations: Not on file     Relationship status: Not on file    Intimate partner violence     Fear of current or ex partner: Not on file     Emotionally abused: Not on file     Physically abused: Not on file     Forced sexual activity: Not on file   Other Topics Concern    Not on file   Social History Narrative    Not on file        Family History   Problem Relation Age of Onset    Hypothyroidism Mother    [employer] High Blood Pressure Father     Depression Maternal Uncle     Asthma Paternal Aunt     High Blood Pressure Maternal Grandmother     Hypothyroidism Maternal Grandmother     Cancer Maternal Grandfather     Stroke Paternal Grandmother     Heart Attack Paternal Grandfather     Irritable Bowel Syndrome Sister            Vitals:    03/16/21 1044   BP: 98/64   Pulse: 72   Resp: 18   Temp: 98 °F (36.7 °C)   TempSrc: Temporal   SpO2: 98%   Weight: 101 lb (45.8 kg)   Height: 5' (1.524 m)     Estimated body mass index is 19.73 kg/m² as calculated from the following:    Height as of this encounter: 5' (1.524 m). Weight as of this encounter: 101 lb (45.8 kg). Most Recent Labs  CBC  Lab Results   Component Value Date    WBC 8.6 03/02/2021    RBC 4.57 03/02/2021    HGB 13.8 03/02/2021    HCT 43.1 03/02/2021    MCV 94.3 03/02/2021     03/02/2021      CMP  Lab Results   Component Value Date     03/02/2021    K 4.7 03/02/2021     03/02/2021    CO2 24 03/02/2021    ANIONGAP 10 03/02/2021    GLUCOSE 86 03/02/2021    BUN 9 03/02/2021    CREATININE 0.8 03/02/2021    LABGLOM >60 03/02/2021    GFRAA >60 03/02/2021    CALCIUM 9.1 03/02/2021    PROT 7.3 03/02/2021    LABALBU 4.5 03/02/2021    BILITOT 0.3 03/02/2021    ALKPHOS 50 03/02/2021    AST 16 03/02/2021    ALT 20 03/02/2021     TSH  Lab Results   Component Value Date    TSH 1.990 03/02/2021     VITAMIN D  Lab Results   Component Value Date    VITD25 38 02/15/2021         Physical Exam  Vitals signs and nursing note reviewed. Constitutional:       Appearance: Normal appearance. HENT:      Head: Normocephalic and atraumatic. Right Ear: Tympanic membrane, ear canal and external ear normal.      Left Ear: Tympanic membrane, ear canal and external ear normal.      Nose: Nose normal.      Mouth/Throat:      Mouth: Mucous membranes are moist.   Eyes:      Extraocular Movements: Extraocular movements intact.       Pupils: Pupils are equal, round, and reactive to light. Neck:      Musculoskeletal: Normal range of motion and neck supple. Cardiovascular:      Rate and Rhythm: Normal rate and regular rhythm. Pulses: Normal pulses. Pulmonary:      Breath sounds: Normal breath sounds. Abdominal:      General: Bowel sounds are normal.      Palpations: Abdomen is soft. Musculoskeletal: Normal range of motion. Skin:     General: Skin is warm and dry. Capillary Refill: Capillary refill takes less than 2 seconds. Findings: Lesion (left foot) present. Neurological:      General: No focal deficit present. Mental Status: He is alert and oriented to person, place, and time. Psychiatric:         Mood and Affect: Mood normal.         Behavior: Behavior normal.         Thought Content: Thought content normal.         ASSESSMENT/PLAN:  Alverto Gil was seen today for discuss medications. Diagnoses and all orders for this visit:    Tremor  -     [facility]    Gender dysphoria  -     Lipid Panel; Future           Educational materials and/or home exercises printed for patient's review and were included in patient instructions on his/her After Visit Summary and given to patient at the end of visit. Counseled regarding above diagnosis, including possible risks and complications,  especially if left uncontrolled. Counseled regarding the possible side effects, risks, benefits and alternatives to treatment; patient and/or guardian verbalizes understanding, agrees, feels comfortable with and wishes to proceed with above treatment plan. Advised patient to call with any new medication issues, and read all Rx info from pharmacy to assure aware of all possible risks and side effects of medication before taking. Reviewed age and gender appropriate health screening exams and vaccinations.   Advised patient regarding importance of keeping up with recommended health maintenance and to schedule as soon as possible if overdue, as

## 2021-03-29 ENCOUNTER — OFFICE VISIT (OUTPATIENT)
Dept: NEUROLOGY | Age: 26
End: 2021-03-29
Payer: COMMERCIAL

## 2021-03-29 VITALS
WEIGHT: 101 LBS | DIASTOLIC BLOOD PRESSURE: 70 MMHG | SYSTOLIC BLOOD PRESSURE: 100 MMHG | BODY MASS INDEX: 19.83 KG/M2 | HEIGHT: 60 IN | TEMPERATURE: 97.8 F

## 2021-03-29 DIAGNOSIS — R25.1 EXCESSIVE PHYSIOLOGIC TREMOR: Chronic | ICD-10-CM

## 2021-03-29 DIAGNOSIS — G47.9 SLEEP DISTURBANCE: ICD-10-CM

## 2021-03-29 DIAGNOSIS — R25.1 TREMOR OF BOTH HANDS: ICD-10-CM

## 2021-03-29 DIAGNOSIS — F41.1 NEUROSIS, ANXIETY, GENERALIZED: Primary | ICD-10-CM

## 2021-03-29 PROBLEM — F41.9 ANXIETY AND DEPRESSION: Status: ACTIVE | Noted: 2021-03-29

## 2021-03-29 PROCEDURE — 1036F TOBACCO NON-USER: CPT | Performed by: PSYCHIATRY & NEUROLOGY

## 2021-03-29 PROCEDURE — G8482 FLU IMMUNIZE ORDER/ADMIN: HCPCS | Performed by: PSYCHIATRY & NEUROLOGY

## 2021-03-29 PROCEDURE — G8420 CALC BMI NORM PARAMETERS: HCPCS | Performed by: PSYCHIATRY & NEUROLOGY

## 2021-03-29 PROCEDURE — G8427 DOCREV CUR MEDS BY ELIG CLIN: HCPCS | Performed by: PSYCHIATRY & NEUROLOGY

## 2021-03-29 PROCEDURE — 99203 OFFICE O/P NEW LOW 30 MIN: CPT | Performed by: PSYCHIATRY & NEUROLOGY

## 2021-03-29 ASSESSMENT — ENCOUNTER SYMPTOMS
EYES NEGATIVE: 1
GASTROINTESTINAL NEGATIVE: 1
RESPIRATORY NEGATIVE: 1

## 2021-03-29 NOTE — PROGRESS NOTES
Neurology Consult Note:    Patient: Ara Horton  : 1995  Date: 21  Referring provider: Micki Devine DO    Referral to Neurology: History of intermittent hand tremors x 8 years. Dear Micki Devine DO:    Thank you for your referral of Ara Horton to the Neurology clinic, an alert, anxious 69-year-old woman complaining of intermittent bilateral hand tremors. She notices tremors when she is more tired and stressed or over-exerts herself such as when cleaning or doing a lot of physical work. She was recently started on propranolol 20 mg daily but reports no improvement of her tremors. There are no tremors observed at this time. She is also treated with paroxetine and bupropion for anxiety/depression. There is a history of gender dysphoria. She drinks coffee occasionally. There is a history of alcohol use socially. There is a history of chronic fatigue, vitamin D deficiency and anxiety and ADHD. She was previously changed from Effexor to Wellbutrin. She was previously treated with duloxetine. She has chronic insomnia. No tremors are observed today. She denies TIA/stroke symptoms. She also complains of awakening at nighttime feeling sweaty and warm. The symptoms are nonspecific but may occur in people with metabolic or anxiety/stress disorders, cardiovascular disease or may be related to medications. Lab Data: Reviewed from 3/2, 3/16/2021. Normal lipid panel, CMP, CBC with differential, nonreactive HIV. Reactive RPR. Vitamin D level 22. Imaging Data: No brain imaging study on file in Epic/media.     Current Outpatient Medications   Medication Sig Dispense Refill    PARoxetine (PAXIL) 10 MG tablet Take 1 tablet by mouth daily      medroxyPROGESTERone (DEPO-PROVERA) 150 MG/ML injection Inject 150 mg into the muscle every 3 months      propranolol (INDERAL) 20 MG tablet Take 1 tablet by mouth daily 30 tablet 3    esomeprazole (NEXIUM) 40 MG delayed release capsule TAKE 1 CAPSULE BY MOUTH EVERY DAY IN THE MORNING BEFORE BREAKFAST WITH WATER      buPROPion (WELLBUTRIN XL) 150 MG extended release tablet Take 150 mg by mouth every morning      vitamin D (ERGOCALCIFEROL) 1.25 MG (80352 UT) CAPS capsule TAKE ONE CAPSULE ONE TIME A WEEK       No current facility-administered medications for this visit. No Known Allergies    Patient Active Problem List   Diagnosis    Intermittent tremor    Gender dysphoria in adult    Anxiety and depression    Sleep disturbance    Excessive physiologic tremor       Past Medical History:   Diagnosis Date    Acid reflux     Anxiety     Depression     Excessive physiologic tremor 3/29/2021    Gender dysphoria in adult 3/29/2021    Sleep disturbance 3/29/2021    Tremor 3/29/2021    Vitamin D deficiency        No past surgical history on file.     Family History   Problem Relation Age of Onset    Hypothyroidism Mother     High Blood Pressure Father     Depression Maternal Uncle     Asthma Paternal Aunt     High Blood Pressure Maternal Grandmother     Hypothyroidism Maternal Grandmother     Cancer Maternal Grandfather     Stroke Paternal Grandmother     Heart Attack Paternal Grandfather     Irritable Bowel Syndrome Sister        Social History     Socioeconomic History    Marital status: Single     Spouse name: Not on file    Number of children: Not on file    Years of education: Not on file    Highest education level: Not on file   Occupational History    Not on file   Social Needs    Financial resource strain: Not on file    Food insecurity     Worry: Not on file     Inability: Not on file   [employer] needs     Medical: Not on file     Non-medical: Not on file   Tobacco Use    Smoking status: Never Smoker    Smokeless tobacco: Never Used   Substance and Sexual Activity    Alcohol use: Never     Frequency: Never     Binge frequency: Never    Drug use: Not on file     Comment: last used in September 2020    Sexual activity: Yes   Lifestyle    Physical activity     Days per week: Not on file     Minutes per session: Not on file    Stress: Not on file   Relationships    Social connections     Talks on phone: Not on file     Gets together: Not on file     Attends Adventism service: Not on file     Active member of club or organization: Not on file     Attends meetings of clubs or organizations: Not on file     Relationship status: Not on file    Intimate partner violence     Fear of current or ex partner: Not on file     Emotionally abused: Not on file     Physically abused: Not on file     Forced sexual activity: Not on file   Other Topics Concern    Not on file   Social History Narrative    Not on file     Review of Systems   Constitutional: Negative. HENT: Negative. Eyes: Negative. Respiratory: Negative. Cardiovascular: Negative. Gastrointestinal: Negative. Endocrine: Negative. Genitourinary: Negative. Musculoskeletal: Negative. Skin: Negative. Neurological: Positive for tremors. Hematological: Negative. Psychiatric/Behavioral: Positive for sleep disturbance. The patient is nervous/anxious. History anxiety/depression; history of gender dysphoria     Neurologic Exam:  /70 (Site: Right Upper Arm, Position: Sitting, Cuff Size: Medium Adult)   Temp 97.8 °F (36.6 °C)   Ht 5' (1.524 m)   Wt 101 lb (45.8 kg)   BMI 19.73 kg/m²   General appearance: Alert, anxious, thin, well-groomed, seated on the exam table, no acute distress  HEENT: Normocephalic/atraumatic. Neck: Supple  Cardiac: RRR  Respiratory: grossly clear  Extremities: No edema, erythema or cyanosis  Skin: No apparent lesions rashes  Musculoskeletal: No fasciculations or tremors  Mental Status: Alert, oriented x4  Speech/Language: Clear, grossly fluent  Attention span/Concentration: Grossly intact  Affect/Mood: Moderately anxious, tremulous voice at times.   Insight/Judgement: Appears fairly good     Fund of Knowledge/Current events: Grossly intact  CN II-XII:     Pupils: Equal, reactive to light, 2.5 mm     EOM's: Full without nystagmus  Visual Fields: Full to confrontation  Fundi: Miosis to light, grossly unremarkable  CN V: normal V1-V3  CN VII: No facial droop  CN VIII: Hearing grossly intact  CN IX-XII: Tongue midline  SCM/Trapezii: 5/5 power  Motor: 5/5 power in the upper and lower extremities without tremor or drift and normal motor tone without cogwheeling or spasticity. Intact for motor function of both hands, symmetric. No asymmetric bradykinesia. DTR's: 2+ and symmetric in the upper and lower extremities, no ankle clonus, plantar responses are flexor. Sensory: Grossly intact subjectively light touch and sharp stick testing. Coordination/Gait: No gross limb dysmetria, truncal or cerebellar gait ataxia. Normal tandem gait, two-step turns. Assessment/Plan:  1. History of intermittent hand tremors, increased with fatigue, anxiety/stress, probably related to an exaggerated physiologic tremor or stress related and a drug-induced tremor intermittently related to her psychotropic medications cannot be excluded. 2.  Generalized anxiety, dysthymia. 3.  History of gender dysphoria. 4. Information was provided from the [facility] on tremor. There are no specific treatment recommendations except she may prefer regular propranolol 10 mg tablet twice to 3 times daily as needed. She has a lower blood pressure reading today. Mysoline is contraindicated which contains phenobarbital and would not be recommended as there are no disabling tremors plus would cause drug-drug interactions with her psychotropic medication. There is no evidence of parkinsonism. 5.  She may continue her medical follow-up to your office for general medical and supportive care. There are no additional recommendations from a neurologic perspective.       Sincerely,      Ellen Abraham MD    This note was created using speech recognition transcription software. Despite proofreading, there may be several typographical errors present that may affect the meaning of the content. Please call with any questions. Note: A total time of 35 mins. was spent on the date of service in preparation for this visit, which included face-to-face patient care and completing clinical documentation, and including counseling and coordination of care based on clinical impression, neurologic diagnosis, review of pertinent imaging studies, test results, implementation and discussion of treatment plan, risk factor reduction and patient and/or family education.

## 2021-04-12 DIAGNOSIS — R25.1 EXCESSIVE PHYSIOLOGIC TREMOR: Primary | Chronic | ICD-10-CM

## 2021-04-12 RX ORDER — PROPRANOLOL HYDROCHLORIDE 10 MG/1
10 TABLET ORAL 3 TIMES DAILY
Qty: 90 TABLET | Refills: 3 | Status: SHIPPED
Start: 2021-04-12 | End: 2021-07-06 | Stop reason: ALTCHOICE

## 2021-04-12 NOTE — PROGRESS NOTES
Spoke with patient over the phone, discussed that her neurologist told her she should be on propranolol 10 TID.  Will adjust dosage for the patient

## 2021-04-15 ENCOUNTER — OFFICE VISIT (OUTPATIENT)
Dept: CHIROPRACTIC MEDICINE | Age: 26
End: 2021-04-15
Payer: COMMERCIAL

## 2021-04-15 VITALS
BODY MASS INDEX: 20.42 KG/M2 | HEIGHT: 60 IN | TEMPERATURE: 97.1 F | WEIGHT: 104 LBS | RESPIRATION RATE: 18 BRPM | HEART RATE: 63 BPM | OXYGEN SATURATION: 99 %

## 2021-04-15 DIAGNOSIS — M54.6 CHRONIC BILATERAL THORACIC BACK PAIN: ICD-10-CM

## 2021-04-15 DIAGNOSIS — M99.02 SEGMENTAL AND SOMATIC DYSFUNCTION OF THORACIC REGION: ICD-10-CM

## 2021-04-15 DIAGNOSIS — G89.29 CHRONIC BILATERAL THORACIC BACK PAIN: ICD-10-CM

## 2021-04-15 DIAGNOSIS — M99.03 SEGMENTAL AND SOMATIC DYSFUNCTION OF LUMBAR REGION: ICD-10-CM

## 2021-04-15 DIAGNOSIS — G89.29 CHRONIC BILATERAL LOW BACK PAIN WITHOUT SCIATICA: ICD-10-CM

## 2021-04-15 DIAGNOSIS — M54.50 CHRONIC BILATERAL LOW BACK PAIN WITHOUT SCIATICA: ICD-10-CM

## 2021-04-15 DIAGNOSIS — M54.2 CERVICALGIA: ICD-10-CM

## 2021-04-15 DIAGNOSIS — M99.01 SEGMENTAL AND SOMATIC DYSFUNCTION OF CERVICAL REGION: Primary | ICD-10-CM

## 2021-04-15 PROCEDURE — G8427 DOCREV CUR MEDS BY ELIG CLIN: HCPCS | Performed by: CHIROPRACTOR

## 2021-04-15 PROCEDURE — G8420 CALC BMI NORM PARAMETERS: HCPCS | Performed by: CHIROPRACTOR

## 2021-04-15 PROCEDURE — 98941 CHIROPRACT MANJ 3-4 REGIONS: CPT | Performed by: CHIROPRACTOR

## 2021-04-15 PROCEDURE — 1036F TOBACCO NON-USER: CPT | Performed by: CHIROPRACTOR

## 2021-04-15 PROCEDURE — 99203 OFFICE O/P NEW LOW 30 MIN: CPT | Performed by: CHIROPRACTOR

## 2021-04-15 ASSESSMENT — ENCOUNTER SYMPTOMS
SHORTNESS OF BREATH: 0
BACK PAIN: 1
BOWEL INCONTINENCE: 0
COUGH: 0

## 2021-04-15 NOTE — PROGRESS NOTES
[facility]    4/15/21  Leonora Sanchez : 1995 Sex: adult  Age: 26 y.o. Patient was referred by Chelsie Villalobos DO    Chief Complaint   Patient presents with    Back Pain       Whole back -  Tight. Cant relax. No injury. No prior chiro care      Back Pain  This is a chronic problem. The current episode started more than 1 year ago. The problem occurs intermittently. The problem is unchanged. The pain is present in the lumbar spine and thoracic spine. The quality of the pain is described as aching and stabbing. The pain does not radiate. The pain is at a severity of 3/10 (2-8/10 range). Exacerbated by: work, stress, standing at work as a . Pertinent negatives include no bladder incontinence, bowel incontinence, fever, headaches, numbness or weakness. He has tried home exercises and heat (massage gun, ) for the symptoms. The treatment provided moderate relief. Red Flags:  none    Review of Systems   Constitutional: Negative for chills and fever. Respiratory: Negative for cough and shortness of breath. Gastrointestinal: Negative for bowel incontinence. Genitourinary: Negative for bladder incontinence. Musculoskeletal: Positive for back pain and myalgias. Neurological: Negative for weakness, numbness and headaches.          Current Outpatient Medications:     propranolol (INDERAL) 10 MG tablet, Take 1 tablet by mouth 3 times daily, Disp: 90 tablet, Rfl: 3    PARoxetine (PAXIL) 10 MG tablet, Take 1 tablet by mouth daily, Disp: , Rfl:     medroxyPROGESTERone (DEPO-PROVERA) 150 MG/ML injection, Inject 150 mg into the muscle every 3 months, Disp: , Rfl:     esomeprazole (NEXIUM) 40 MG delayed release capsule, TAKE 1 CAPSULE BY MOUTH EVERY DAY IN THE MORNING BEFORE BREAKFAST WITH WATER, Disp: , Rfl:     buPROPion (WELLBUTRIN XL) 150 MG extended release tablet, Take 150 mg by mouth every morning, Disp: , Rfl:     vitamin D (ERGOCALCIFEROL) 1.25 MG (80339 UT) CAPS capsule, TAKE ONE CAPSULE ONE TIME A WEEK, Disp: , Rfl:     No Known Allergies    Past Medical History:   Diagnosis Date    Acid reflux     Anxiety     Depression     Excessive physiologic tremor 3/29/2021    Gender dysphoria in adult 3/29/2021    Sleep disturbance 3/29/2021    Tremor 3/29/2021    Vitamin D deficiency      Family History   Problem Relation Age of Onset    Hypothyroidism Mother     High Blood Pressure Father     Depression Maternal Uncle     Asthma Paternal Aunt     High Blood Pressure Maternal Grandmother     Hypothyroidism Maternal Grandmother     Cancer Maternal Grandfather     Stroke Paternal Grandmother     Heart Attack Paternal Grandfather     Irritable Bowel Syndrome Sister      No past surgical history on file.   Social History     Socioeconomic History    Marital status: Single     Spouse name: Not on file    Number of children: Not on file    Years of education: Not on file    Highest education level: Not on file   Occupational History    Not on file   Social Needs    Financial resource strain: Not on file    Food insecurity     Worry: Not on file     Inability: Not on file    Transportation needs     Medical: Not on file     Non-medical: Not on file   Tobacco Use    Smoking status: Never Smoker    Smokeless tobacco: Never Used   Substance and Sexual Activity    Alcohol use: Never     Frequency: Never     Binge frequency: Never    Drug use: Not on file     Comment: last used in September 2020    Sexual activity: Yes   Lifestyle    Physical activity     Days per week: Not on file     Minutes per session: Not on file    Stress: Not on file   Relationships    Social connections     Talks on phone: Not on file     Gets together: Not on file     Attends Sikh service: Not on file     Active member of club or organization: Not on file     Attends meetings of clubs or organizations: Not on file     Relationship status: Not on file    Intimate partner violence     Fear of current or ex partner: Not on file     Emotionally abused: Not on file     Physically abused: Not on file     Forced sexual activity: Not on file   Other Topics Concern    Not on file   Social History Narrative    Not on file       Vitals:    04/15/21 1038   Pulse: 63   Resp: 18   Temp: 97.1 °F (36.2 °C)   TempSrc: Temporal   SpO2: 99%   Weight: 104 lb (47.2 kg)   Height: 5' (1.524 m)       EXAM:  He appears well. No apparent distress. Alert and oriented x3. Ambulates without difficulty. Lumbar active ranges of motion are mildly limited in bilateral lateral flexion and extension with endrange pain; flexion is full complete and pain-free. Cervical active ranges of motion are mildly limited in all planes but pain-free. There is anterior head carriage, forward rounded shoulders and increased thoracic kyphosis in seated position. Upon standing, there is continued anterior head carriage. Reflexes are +2 and symmetrical at the Biceps, Brachioradialis, Triceps, Patella and Achilles. Manual muscle testing reveal 5/5 strength throughout the UE and LE indicator muscles B/L. Sensation to light touch is WNL to the upper and lower extremity dermatomes. Acevedo's and Tromner's are absent. Plantar response reveals down-going toes. Posterior Tibial and Radial pulses 2/4. Seated SLR: Negative bilateral  Supine SLR: Negative bilateral, but notes hamstring hypertonicity at approximately 70 degrees bilaterally  Obrien's: Negative bilateral    Cervical Compression: Negative   Cervical Distraction: Negative  Foraminal Compression: Negative bilateral  Maximum Cervical Rotatory Compression Testing: Negative bilateral    No midline pain with palpation throughout spinal regions. There are numerous trigger points and myofascial adhesions in the bilateral trapezius, levator scapulae, suboccipitals. Tenderness with palpation bilateral SI joints.   SI compression testing and thigh thrust are both negative bilaterally    Joint fixation with motion screening today at C7-T1, T4-6, T12-L1, L4-S1. Dawson Sofia was seen today for back pain. Diagnoses and all orders for this visit:    Segmental and somatic dysfunction of cervical region    Segmental and somatic dysfunction of thoracic region    Segmental and somatic dysfunction of lumbar region    Chronic bilateral low back pain without sciatica    Cervicalgia    Chronic bilateral thoracic back pain        Treatment Plan:   I spoke with him regarding my exam findings and treatment options. Subluxations, myofascial pain and postural issues noted I recommended that we start a trial of conservative care today consisting manipulation with home-based therapeutic exercises. I will plan on seeing him 1 times per week for 4 weeks, then reassess to determine response to care and future options. With consent, I did begin today. Problem/Goals  Decrease pain and/or swelling and Increase ROM and/or flexibility    Today's Treatment  Diversified manipulation was performed to the listed segments today in the cervical, thoracic, lumbar regions. Tolerated well. Brief trigger point therapy to the affected muscles was also performed. I spoke to him regarding posttreatment soreness. Should any arise, he  may use ice for 10-15 minutes, over-the-counter NSAIDs or topical gels. I will be emailing him his home-based therapeutic exercise program with his consent. This will come from the [facility] website. Follow-up next week for continued care    Seen By:  Chandler Harkins DC    * This note was created using voice recognition software.   The note was reviewed, however grammatical errors may exist.

## 2021-04-19 ENCOUNTER — OFFICE VISIT (OUTPATIENT)
Dept: FAMILY MEDICINE CLINIC | Age: 26
End: 2021-04-19
Payer: COMMERCIAL

## 2021-04-19 VITALS
RESPIRATION RATE: 16 BRPM | OXYGEN SATURATION: 98 % | TEMPERATURE: 98.6 F | WEIGHT: 105 LBS | HEART RATE: 90 BPM | SYSTOLIC BLOOD PRESSURE: 98 MMHG | BODY MASS INDEX: 20.62 KG/M2 | DIASTOLIC BLOOD PRESSURE: 60 MMHG | HEIGHT: 60 IN

## 2021-04-19 DIAGNOSIS — J02.9 SORE THROAT: Primary | ICD-10-CM

## 2021-04-19 DIAGNOSIS — J02.0 ACUTE STREPTOCOCCAL PHARYNGITIS: ICD-10-CM

## 2021-04-19 LAB — S PYO AG THROAT QL: POSITIVE

## 2021-04-19 PROCEDURE — G8420 CALC BMI NORM PARAMETERS: HCPCS | Performed by: STUDENT IN AN ORGANIZED HEALTH CARE EDUCATION/TRAINING PROGRAM

## 2021-04-19 PROCEDURE — 1036F TOBACCO NON-USER: CPT | Performed by: STUDENT IN AN ORGANIZED HEALTH CARE EDUCATION/TRAINING PROGRAM

## 2021-04-19 PROCEDURE — G8427 DOCREV CUR MEDS BY ELIG CLIN: HCPCS | Performed by: STUDENT IN AN ORGANIZED HEALTH CARE EDUCATION/TRAINING PROGRAM

## 2021-04-19 PROCEDURE — 99213 OFFICE O/P EST LOW 20 MIN: CPT | Performed by: STUDENT IN AN ORGANIZED HEALTH CARE EDUCATION/TRAINING PROGRAM

## 2021-04-19 PROCEDURE — 87880 STREP A ASSAY W/OPTIC: CPT | Performed by: STUDENT IN AN ORGANIZED HEALTH CARE EDUCATION/TRAINING PROGRAM

## 2021-04-19 RX ORDER — FLUTICASONE PROPIONATE 50 MCG
2 SPRAY, SUSPENSION (ML) NASAL DAILY
Qty: 3 BOTTLE | Refills: 1 | Status: SHIPPED | OUTPATIENT
Start: 2021-04-19

## 2021-04-19 RX ORDER — AMOXICILLIN 500 MG/1
500 CAPSULE ORAL 2 TIMES DAILY
Qty: 20 CAPSULE | Refills: 0 | Status: SHIPPED | OUTPATIENT
Start: 2021-04-19 | End: 2021-04-29

## 2021-04-19 RX ORDER — ATOMOXETINE 40 MG/1
1 CAPSULE ORAL DAILY
COMMUNITY
Start: 2021-04-13 | End: 2021-07-22 | Stop reason: ALTCHOICE

## 2021-04-19 ASSESSMENT — ENCOUNTER SYMPTOMS
CONSTIPATION: 0
CHEST TIGHTNESS: 0
VOMITING: 0
DIARRHEA: 0
NAUSEA: 0
BACK PAIN: 0
SORE THROAT: 1
ABDOMINAL PAIN: 0
SHORTNESS OF BREATH: 0
COUGH: 0
RHINORRHEA: 0
EYE PAIN: 0

## 2021-04-19 NOTE — PROGRESS NOTES
(PAXIL) 10 MG tablet Take 1 tablet by mouth daily Yes Historical Provider, MD   medroxyPROGESTERone (DEPO-PROVERA) 150 MG/ML injection Inject 150 mg into the muscle every 3 months Yes Historical Provider, MD   esomeprazole ([address]) 40 MG delayed release capsule TAKE 1 CAPSULE BY MOUTH EVERY DAY IN THE MORNING BEFORE BREAKFAST WITH WATER Yes Historical Provider, MD   buPROPion (WELLBUTRIN XL) 150 MG extended release tablet Take 150 mg by mouth every morning Yes Historical Provider, MD   vitamin D (ERGOCALCIFEROL) 1.25 MG (18168 UT) CAPS capsule TAKE ONE CAPSULE ONE TIME A WEEK Yes Historical Provider, MD   atomoxetine (STRATTERA) 40 MG capsule Take 1 capsule by mouth daily  Historical Provider, MD        No Known Allergies    Past Medical History:   Diagnosis Date    Acid reflux     Anxiety     Depression     Excessive physiologic tremor 3/29/2021    Gender dysphoria in adult 3/29/2021    Sleep disturbance 3/29/2021    Tremor 3/29/2021    Vitamin D deficiency        History reviewed. No pertinent surgical history.     Social History     Socioeconomic History    Marital status: Single     Spouse name: Not on file    Number of children: Not on file    Years of education: Not on file    Highest education level: Not on file   Occupational History    Not on file   Social Needs    Financial resource strain: Not on file    Food insecurity     Worry: Not on file     Inability: Not on file    Transportation needs     Medical: Not on file     Non-medical: Not on file   Tobacco Use    Smoking status: Never Smoker    Smokeless tobacco: Never Used   Substance and Sexual Activity    Alcohol use: Never     Frequency: Never     Binge frequency: Never    Drug use: Not on file     Comment: last used in September 2020    Sexual activity: Yes   Lifestyle    Physical activity     Days per week: Not on file     Minutes per session: Not on file    Stress: Not on file   Relationships    Social connections     Talks on TSH 1.990 03/02/2021     FREET4  No results found for: [id number]  LIPID  Lab Results   Component Value Date    CHOL 134 03/16/2021    HDL 44 03/16/2021    LDLCALC 82 03/16/2021    TRIG 38 03/16/2021     VITAMIN D  Lab Results   Component Value Date    VITD25 38 02/15/2021         Physical Exam  Vitals signs and nursing note reviewed. Constitutional:       Appearance: Normal appearance. HENT:      Head: Normocephalic and atraumatic. Right Ear: Tympanic membrane, ear canal and external ear normal.      Left Ear: Tympanic membrane, ear canal and external ear normal.      Nose: Nose normal.      Mouth/Throat:      Mouth: Mucous membranes are moist.      Pharynx: Posterior oropharyngeal erythema present. Eyes:      Extraocular Movements: Extraocular movements intact. Pupils: Pupils are equal, round, and reactive to light. Neck:      Musculoskeletal: Normal range of motion. Cardiovascular:      Rate and Rhythm: Normal rate and regular rhythm. Pulmonary:      Effort: Pulmonary effort is normal.      Breath sounds: Normal breath sounds. Neurological:      Mental Status: He is alert. ASSESSMENT/PLAN:  Leidy Contreras was seen today for dizziness, pharyngitis and fever. Diagnoses and all orders for this visit:    Sore throat  -     POCT rapid strep A  -     amoxicillin (AMOXIL) 500 MG capsule; Take 1 capsule by mouth 2 times daily for 10 days  -     fluticasone (FLONASE) 50 MCG/ACT nasal spray; 2 sprays by Each Nostril route daily    Acute streptococcal pharyngitis  -     amoxicillin (AMOXIL) 500 MG capsule; Take 1 capsule by mouth 2 times daily for 10 days  -     fluticasone (FLONASE) 50 MCG/ACT nasal spray; 2 sprays by Each Nostril route daily           Educational materials and/or home exercises printed for patient's review and were included in patient instructions on his/her After Visit Summary and given to patient at the end of visit.        Counseled regarding above diagnosis, including possible risks and complications,  especially if left uncontrolled. Counseled regarding the possible side effects, risks, benefits and alternatives to treatment; patient and/or guardian verbalizes understanding, agrees, feels comfortable with and wishes to proceed with above treatment plan. Advised patient to call with any new medication issues, and read all Rx info from pharmacy to assure aware of all possible risks and side effects of medication before taking. Reviewed age and gender appropriate health screening exams and vaccinations. Advised patient regarding importance of keeping up with recommended health maintenance and to schedule as soon as possible if overdue, as this is important in assessing for undiagnosed pathology, especially cancer, as well as protecting against potentially harmful/life threatening disease. Patient and/or guardian verbalizes understanding and agrees with above counseling, assessment and plan. All questions answered. Return if symptoms worsen or fail to improve. An  electronic signature was used to authenticate this note.     --Latonia Hector DO on 4/19/2021 at 1:04 PM

## 2021-05-03 ENCOUNTER — OFFICE VISIT (OUTPATIENT)
Dept: CHIROPRACTIC MEDICINE | Age: 26
End: 2021-05-03
Payer: COMMERCIAL

## 2021-05-03 VITALS — HEART RATE: 68 BPM | TEMPERATURE: 97.8 F | RESPIRATION RATE: 14 BRPM | OXYGEN SATURATION: 95 %

## 2021-05-03 DIAGNOSIS — M99.01 SEGMENTAL AND SOMATIC DYSFUNCTION OF CERVICAL REGION: Primary | ICD-10-CM

## 2021-05-03 DIAGNOSIS — M54.2 CERVICALGIA: ICD-10-CM

## 2021-05-03 DIAGNOSIS — M54.50 CHRONIC BILATERAL LOW BACK PAIN WITHOUT SCIATICA: ICD-10-CM

## 2021-05-03 DIAGNOSIS — M99.03 SEGMENTAL AND SOMATIC DYSFUNCTION OF LUMBAR REGION: ICD-10-CM

## 2021-05-03 DIAGNOSIS — G89.29 CHRONIC BILATERAL THORACIC BACK PAIN: ICD-10-CM

## 2021-05-03 DIAGNOSIS — G89.29 CHRONIC BILATERAL LOW BACK PAIN WITHOUT SCIATICA: ICD-10-CM

## 2021-05-03 DIAGNOSIS — M54.6 CHRONIC BILATERAL THORACIC BACK PAIN: ICD-10-CM

## 2021-05-03 DIAGNOSIS — M99.02 SEGMENTAL AND SOMATIC DYSFUNCTION OF THORACIC REGION: ICD-10-CM

## 2021-05-03 PROCEDURE — 98941 CHIROPRACT MANJ 3-4 REGIONS: CPT | Performed by: CHIROPRACTOR

## 2021-05-03 NOTE — PROGRESS NOTES
5/3/21  Enriqueta Parish : 1995 Sex: adult  Age: 26 y.o. Chief Complaint   Patient presents with    Back Pain       HPI:   Overall, things are essentially the same. Since I last saw him, he had strep throat, was down for several days. Admits he had not done his home exercises as instructed. Has recovered from that illness. Presenting today with continued neck and back tightness, stiffness. No new issues overall. Current Outpatient Medications:     atomoxetine (STRATTERA) 40 MG capsule, Take 1 capsule by mouth daily, Disp: , Rfl:     fluticasone (FLONASE) 50 MCG/ACT nasal spray, 2 sprays by Each Nostril route daily, Disp: 3 Bottle, Rfl: 1    propranolol (INDERAL) 10 MG tablet, Take 1 tablet by mouth 3 times daily, Disp: 90 tablet, Rfl: 3    PARoxetine (PAXIL) 10 MG tablet, Take 1 tablet by mouth daily, Disp: , Rfl:     medroxyPROGESTERone (DEPO-PROVERA) 150 MG/ML injection, Inject 150 mg into the muscle every 3 months, Disp: , Rfl:     esomeprazole (NEXIUM) 40 MG delayed release capsule, TAKE 1 CAPSULE BY MOUTH EVERY DAY IN THE MORNING BEFORE BREAKFAST WITH WATER, Disp: , Rfl:     buPROPion (WELLBUTRIN XL) 150 MG extended release tablet, Take 150 mg by mouth every morning, Disp: , Rfl:     vitamin D (ERGOCALCIFEROL) 1.25 MG (11382 UT) CAPS capsule, TAKE ONE CAPSULE ONE TIME A WEEK, Disp: , Rfl:     Exam:   Vitals:    21 1106   Pulse: 68   Resp: 14   Temp: 97.8 °F (36.6 °C)   SpO2: 95%         There are hypertonic and tender fibers noted today in the cervical, thoracic and lumbar paraspinal muscles. Joint fixation is noted with motion screening at C5-6, T3-5, T7-9, T12-L3. Lynn Brock was seen today for back pain.     Diagnoses and all orders for this visit:    Segmental and somatic dysfunction of cervical region    Segmental and somatic dysfunction of thoracic region    Segmental and somatic dysfunction of lumbar region    Chronic bilateral thoracic back pain    Chronic bilateral low back pain without sciatica    Cervicalgia        Treatment Plan:  Diversified manipulation to the listed cervical, thoracic, lumbar, pelvic segments. Tolerated well. Encourage him to continue with the home-based therapeutic exercises previously provided.   I will see him back in 1 week for continued care        Seen By:  Rick Freeman

## 2021-05-10 ENCOUNTER — OFFICE VISIT (OUTPATIENT)
Dept: CHIROPRACTIC MEDICINE | Age: 26
End: 2021-05-10
Payer: COMMERCIAL

## 2021-05-10 VITALS — TEMPERATURE: 98.1 F | HEART RATE: 81 BPM | OXYGEN SATURATION: 98 % | RESPIRATION RATE: 14 BRPM

## 2021-05-10 DIAGNOSIS — M54.2 CERVICALGIA: ICD-10-CM

## 2021-05-10 DIAGNOSIS — M99.01 SEGMENTAL AND SOMATIC DYSFUNCTION OF CERVICAL REGION: Primary | ICD-10-CM

## 2021-05-10 DIAGNOSIS — M99.03 SEGMENTAL AND SOMATIC DYSFUNCTION OF LUMBAR REGION: ICD-10-CM

## 2021-05-10 DIAGNOSIS — G89.29 CHRONIC BILATERAL THORACIC BACK PAIN: ICD-10-CM

## 2021-05-10 DIAGNOSIS — M54.6 CHRONIC BILATERAL THORACIC BACK PAIN: ICD-10-CM

## 2021-05-10 DIAGNOSIS — M99.02 SEGMENTAL AND SOMATIC DYSFUNCTION OF THORACIC REGION: ICD-10-CM

## 2021-05-10 DIAGNOSIS — M54.50 CHRONIC BILATERAL LOW BACK PAIN WITHOUT SCIATICA: ICD-10-CM

## 2021-05-10 DIAGNOSIS — G89.29 CHRONIC BILATERAL LOW BACK PAIN WITHOUT SCIATICA: ICD-10-CM

## 2021-05-10 PROCEDURE — 98941 CHIROPRACT MANJ 3-4 REGIONS: CPT | Performed by: CHIROPRACTOR

## 2021-05-10 NOTE — PROGRESS NOTES
5/10/21  Lauren Chandler : 1995 Sex: adult  Age: 26 y.o. Chief Complaint   Patient presents with    Neck Pain       HPI:   He states that the neck is doing a bit better. But is having some mid and lower back pain, a bit worse today. States his cat slept between his legs Saturday night, and he slept in an awkward position causing some increased symptoms. Has also been on his feet a bit more, started a new job the other day. No new issues though          Current Outpatient Medications:     atomoxetine (STRATTERA) 40 MG capsule, Take 1 capsule by mouth daily, Disp: , Rfl:     fluticasone (FLONASE) 50 MCG/ACT nasal spray, 2 sprays by Each Nostril route daily, Disp: 3 Bottle, Rfl: 1    propranolol (INDERAL) 10 MG tablet, Take 1 tablet by mouth 3 times daily, Disp: 90 tablet, Rfl: 3    PARoxetine (PAXIL) 10 MG tablet, Take 1 tablet by mouth daily, Disp: , Rfl:     medroxyPROGESTERone (DEPO-PROVERA) 150 MG/ML injection, Inject 150 mg into the muscle every 3 months, Disp: , Rfl:     esomeprazole (NEXIUM) 40 MG delayed release capsule, TAKE 1 CAPSULE BY MOUTH EVERY DAY IN THE MORNING BEFORE BREAKFAST WITH WATER, Disp: , Rfl:     buPROPion (WELLBUTRIN XL) 150 MG extended release tablet, Take 150 mg by mouth every morning, Disp: , Rfl:     vitamin D (ERGOCALCIFEROL) 1.25 MG (00045 UT) CAPS capsule, TAKE ONE CAPSULE ONE TIME A WEEK, Disp: , Rfl:     Exam:   Vitals:    05/10/21 1106   Pulse: 81   Resp: 14   Temp: 98.1 °F (36.7 °C)   SpO2: 98%         There are hypertonic and tender fibers noted today in the cervical, thoracic and lumbar paraspinal muscles. Joint fixation is noted with motion screening at T12-L1 L5-S1, T5-7, C5-6. Trigger points bilateral suboccipitals. Laney Calabrese was seen today for neck pain.     Diagnoses and all orders for this visit:    Segmental and somatic dysfunction of cervical region    Segmental and somatic dysfunction of thoracic region    Segmental and somatic dysfunction

## 2021-05-17 ENCOUNTER — OFFICE VISIT (OUTPATIENT)
Dept: CHIROPRACTIC MEDICINE | Age: 26
End: 2021-05-17
Payer: COMMERCIAL

## 2021-05-17 ENCOUNTER — OFFICE VISIT (OUTPATIENT)
Dept: FAMILY MEDICINE CLINIC | Age: 26
End: 2021-05-17
Payer: COMMERCIAL

## 2021-05-17 VITALS
BODY MASS INDEX: 21.2 KG/M2 | TEMPERATURE: 98.4 F | DIASTOLIC BLOOD PRESSURE: 68 MMHG | HEIGHT: 60 IN | SYSTOLIC BLOOD PRESSURE: 102 MMHG | RESPIRATION RATE: 16 BRPM | WEIGHT: 108 LBS | OXYGEN SATURATION: 98 % | HEART RATE: 78 BPM

## 2021-05-17 VITALS — OXYGEN SATURATION: 97 % | TEMPERATURE: 98 F | RESPIRATION RATE: 14 BRPM | HEART RATE: 67 BPM

## 2021-05-17 DIAGNOSIS — M99.02 SEGMENTAL AND SOMATIC DYSFUNCTION OF THORACIC REGION: ICD-10-CM

## 2021-05-17 DIAGNOSIS — G89.29 CHRONIC BILATERAL THORACIC BACK PAIN: ICD-10-CM

## 2021-05-17 DIAGNOSIS — M99.03 SEGMENTAL AND SOMATIC DYSFUNCTION OF LUMBAR REGION: ICD-10-CM

## 2021-05-17 DIAGNOSIS — G89.29 CHRONIC BILATERAL LOW BACK PAIN WITHOUT SCIATICA: ICD-10-CM

## 2021-05-17 DIAGNOSIS — L72.9 SKIN CYST: Primary | ICD-10-CM

## 2021-05-17 DIAGNOSIS — M54.50 CHRONIC BILATERAL LOW BACK PAIN WITHOUT SCIATICA: ICD-10-CM

## 2021-05-17 DIAGNOSIS — M54.2 CERVICALGIA: ICD-10-CM

## 2021-05-17 DIAGNOSIS — T14.8XXA PIERCING: ICD-10-CM

## 2021-05-17 DIAGNOSIS — M99.01 SEGMENTAL AND SOMATIC DYSFUNCTION OF CERVICAL REGION: Primary | ICD-10-CM

## 2021-05-17 DIAGNOSIS — M54.6 CHRONIC BILATERAL THORACIC BACK PAIN: ICD-10-CM

## 2021-05-17 PROCEDURE — G8427 DOCREV CUR MEDS BY ELIG CLIN: HCPCS | Performed by: NURSE PRACTITIONER

## 2021-05-17 PROCEDURE — 99213 OFFICE O/P EST LOW 20 MIN: CPT | Performed by: NURSE PRACTITIONER

## 2021-05-17 PROCEDURE — 98941 CHIROPRACT MANJ 3-4 REGIONS: CPT | Performed by: CHIROPRACTOR

## 2021-05-17 PROCEDURE — 1036F TOBACCO NON-USER: CPT | Performed by: NURSE PRACTITIONER

## 2021-05-17 PROCEDURE — G8420 CALC BMI NORM PARAMETERS: HCPCS | Performed by: NURSE PRACTITIONER

## 2021-05-17 NOTE — PROGRESS NOTES
21  Gail Hogan : 1995 Sex: adult  Age: 26 y.o. Chief Complaint   Patient presents with    Neck Pain     doing well at this time       HPI:   Pain is has improved. On average, pain is perceived as mild (1-3  pain scale). Change in quality of symptoms: no.  He denies any other symptoms. Current Outpatient Medications:     atomoxetine (STRATTERA) 40 MG capsule, Take 1 capsule by mouth daily, Disp: , Rfl:     fluticasone (FLONASE) 50 MCG/ACT nasal spray, 2 sprays by Each Nostril route daily, Disp: 3 Bottle, Rfl: 1    propranolol (INDERAL) 10 MG tablet, Take 1 tablet by mouth 3 times daily, Disp: 90 tablet, Rfl: 3    PARoxetine (PAXIL) 10 MG tablet, Take 1 tablet by mouth daily, Disp: , Rfl:     medroxyPROGESTERone (DEPO-PROVERA) 150 MG/ML injection, Inject 150 mg into the muscle every 3 months, Disp: , Rfl:     esomeprazole (NEXIUM) 40 MG delayed release capsule, TAKE 1 CAPSULE BY MOUTH EVERY DAY IN THE MORNING BEFORE BREAKFAST WITH WATER, Disp: , Rfl:     buPROPion (WELLBUTRIN XL) 150 MG extended release tablet, Take 150 mg by mouth every morning, Disp: , Rfl:     vitamin D (ERGOCALCIFEROL) 1.25 MG (89160 UT) CAPS capsule, TAKE ONE CAPSULE ONE TIME A WEEK, Disp: , Rfl:     Exam:   Vitals:    21 1115   Pulse: 67   Resp: 14   Temp: 98 °F (36.7 °C)   SpO2: 97%         There are hypertonic and tender fibers noted today in the cervical, thoracic and lumbar paraspinal muscles. Active trigger point right trapezius. Joint fixation is noted with motion screening at C5-6, T4-5, T12-L1, L5-S1. Kolton Ivy was seen today for neck pain.     Diagnoses and all orders for this visit:    Segmental and somatic dysfunction of cervical region    Segmental and somatic dysfunction of thoracic region    Cervicalgia    Chronic bilateral low back pain without sciatica    Chronic bilateral thoracic back pain        Treatment Plan: Continued with diversified manipulation today to the listed

## 2021-05-17 NOTE — PATIENT INSTRUCTIONS
Patient Education        Infection From Body Piercings: Care Instructions  Your Care Instructions  An infected piercing can be serious. The area around your piercing may be painful, swollen, red, and hot. You may see red streaks or pus at the piercing site. You may have a fever. Or you may have swollen or tender lymph nodes. It's important to take good care of your infection at home so it doesn't get worse. Follow-up care is a key part of your treatment and safety. Be sure to make and go to all appointments, and call your doctor if you are having problems. It's also a good idea to know your test results and keep a list of the medicines you take. How can you care for yourself at home? · If your doctor prescribed antibiotics, take them as directed. Do not stop taking them just because you feel better. You need to take the full course of antibiotics. · Remove the jewelry unless your doctor says it's okay to keep it in. Soak the area in warm water for 20 minutes, 3 or 4 times a day. If it's too hard to soak the site (for example, if you had your belly button pierced), apply a warm, moist cloth instead. · If your doctor told you how to care for your infected piercing, follow your doctor's instructions. If you did not get instructions, follow this general advice:  ? Wash the area with clean water 2 times a day. Don't use hydrogen peroxide or alcohol, which can slow healing. ? You may cover the area with a thin layer of petroleum jelly, such as Vaseline, and a nonstick bandage. ? Apply more petroleum jelly and replace the bandage as needed. · Ask your doctor if you can take an over-the-counter pain medicine, such as acetaminophen (Tylenol), ibuprofen (Advil, Motrin), or naproxen (Aleve). Be safe with medicines. Read and follow all instructions on the label. When should you call for help?    Call your doctor now or seek immediate medical care if:    · You lose feeling in the area near the piercing, or it feels numb or tingly.     · The skin near the piercing turns pale or cool.     · The pierced area starts to bleed, and blood soaks through the bandage. Oozing small amounts of blood is normal.   Watch closely for changes in your health, and be sure to contact your doctor if:    · Your symptoms are getting worse. Where can you learn more? Go to [email or web address] and sign in to your Hadapt account. Enter B580 in the Plexx box to learn more about \"Infection From Body Piercings: Care Instructions. \"     If you do not have an account, please click on the \"Sign Up Now\" link. Current as of: February 26, 2020               Content Version: 12.8  © 2006-2021 Healthwise, Incorporated. Care instructions adapted under license by [facility] ([facility]). If you have questions about a medical condition or this instruction, always ask your healthcare professional. [place] [address] any warranty or liability for your use of this information.

## 2021-05-17 NOTE — PROGRESS NOTES
21  [facility] : 1995 Sex: adult  Age 26 y.o. Subjective:  Chief Complaint   Patient presents with    Eye Problem     RT eyebrow bump / possible infection from piercing on 21        HPI:   [facility] , 26 y.o. adult presents to the clinic for evaluation of bump near right eye piercing x 2 months. The patient reports intermittent tenderness to touch. The patient denies redness, edema, and drainage / bleeding. The patient denies lymphangitic streaking. The patient has not taken any treatment for symptoms. The patient reports unchanged symptoms over time. Patient reports symptoms developed shortly after having piercing placed. The patient denies myalgia, arthralgia, chills, and lethargy. The patient also denies headache, fever, chest pain, abdominal pain, shortness of breath, and nausea / vomiting / diarrhea. ROS:   Unless otherwise stated in this report the patient's positive and negative responses for review of systems for constitutional, eyes, ENT, cardiovascular, respiratory, gastrointestinal, neurological, , musculoskeletal, and integument systems and related systems to the presenting problem are either stated in the history of present illness or were not pertinent or were negative for the symptoms and/or complaints related to the presenting medical problem. Positives and pertinent negatives as per HPI. All others reviewed and are negative. PMH:     Past Medical History:   Diagnosis Date    Acid reflux     Anxiety     Depression     Excessive physiologic tremor 3/29/2021    Gender dysphoria in adult 3/29/2021    Sleep disturbance 3/29/2021    Tremor 3/29/2021    Vitamin D deficiency        History reviewed. No pertinent surgical history.     Family History   Problem Relation Age of Onset    Hypothyroidism Mother     High Blood Pressure Father     Depression Maternal Uncle     Asthma Paternal Aunt     High Blood Pressure Maternal Grandmother     Hypothyroidism Maternal Grandmother     Cancer Maternal Grandfather     Stroke Paternal Grandmother     Heart Attack Paternal Grandfather     Irritable Bowel Syndrome Sister        Medications:     Current Outpatient Medications:     atomoxetine (STRATTERA) 40 MG capsule, Take 1 capsule by mouth daily, Disp: , Rfl:     fluticasone (FLONASE) 50 MCG/ACT nasal spray, 2 sprays by Each Nostril route daily, Disp: 3 Bottle, Rfl: 1    propranolol (INDERAL) 10 MG tablet, Take 1 tablet by mouth 3 times daily, Disp: 90 tablet, Rfl: 3    PARoxetine (PAXIL) 10 MG tablet, Take 1 tablet by mouth daily, Disp: , Rfl:     medroxyPROGESTERone (DEPO-PROVERA) 150 MG/ML injection, Inject 150 mg into the muscle every 3 months, Disp: , Rfl:     esomeprazole (NEXIUM) 40 MG delayed release capsule, TAKE 1 CAPSULE BY MOUTH EVERY DAY IN THE MORNING BEFORE BREAKFAST WITH WATER, Disp: , Rfl:     buPROPion (WELLBUTRIN XL) 150 MG extended release tablet, Take 150 mg by mouth every morning, Disp: , Rfl:     vitamin D (ERGOCALCIFEROL) 1.25 MG (94060 UT) CAPS capsule, TAKE ONE CAPSULE ONE TIME A WEEK, Disp: , Rfl:     Allergies:   No Known Allergies    Social History:     Social History     Tobacco Use    Smoking status: Never Smoker    Smokeless tobacco: Never Used   Substance Use Topics    Alcohol use: Never    Drug use: Not on file     Comment: last used in September 2020       Patient lives at home. Physical Exam:     Vitals:    05/17/21 1234   BP: 102/68   Pulse: 78   Resp: 16   Temp: 98.4 °F (36.9 °C)   TempSrc: Temporal   SpO2: 98%   Weight: 108 lb (49 kg)   Height: 5' (1.524 m)       Physical Exam (PE)   Constitutional: Alert, development consistent with age. HENT:      Head: Normocephalic. Right Ear: External ear normal.      Left Ear: External ear normal.      Nose: Normal.      Mouth/Throat:     Mouth: Mucous membranes are moist.      Pharynx: Oropharynx is clear.   Eyes: Pupils: Pupils are equal, round, and APRN-CNP    *NOTE: This report was transcribed using voice recognition software. Every effort was made to ensure accuracy; however, inadvertent computerized transcription errors may be present.

## 2021-06-01 ENCOUNTER — OFFICE VISIT (OUTPATIENT)
Dept: CHIROPRACTIC MEDICINE | Age: 26
End: 2021-06-01
Payer: COMMERCIAL

## 2021-06-01 VITALS — RESPIRATION RATE: 14 BRPM | OXYGEN SATURATION: 93 % | TEMPERATURE: 97.4 F | HEART RATE: 77 BPM

## 2021-06-01 DIAGNOSIS — M54.6 CHRONIC BILATERAL THORACIC BACK PAIN: ICD-10-CM

## 2021-06-01 DIAGNOSIS — M99.01 SEGMENTAL AND SOMATIC DYSFUNCTION OF CERVICAL REGION: Primary | ICD-10-CM

## 2021-06-01 DIAGNOSIS — M25.571 BILATERAL ANKLE PAIN, UNSPECIFIED CHRONICITY: ICD-10-CM

## 2021-06-01 DIAGNOSIS — M25.572 BILATERAL ANKLE PAIN, UNSPECIFIED CHRONICITY: ICD-10-CM

## 2021-06-01 DIAGNOSIS — M54.50 CHRONIC BILATERAL LOW BACK PAIN WITHOUT SCIATICA: ICD-10-CM

## 2021-06-01 DIAGNOSIS — M54.2 CERVICALGIA: ICD-10-CM

## 2021-06-01 DIAGNOSIS — G89.29 CHRONIC BILATERAL LOW BACK PAIN WITHOUT SCIATICA: ICD-10-CM

## 2021-06-01 DIAGNOSIS — G89.29 CHRONIC BILATERAL THORACIC BACK PAIN: ICD-10-CM

## 2021-06-01 DIAGNOSIS — M99.02 SEGMENTAL AND SOMATIC DYSFUNCTION OF THORACIC REGION: ICD-10-CM

## 2021-06-01 DIAGNOSIS — M99.03 SEGMENTAL AND SOMATIC DYSFUNCTION OF LUMBAR REGION: ICD-10-CM

## 2021-06-01 PROCEDURE — 99999 PR OFFICE/OUTPT VISIT,PROCEDURE ONLY: CPT | Performed by: CHIROPRACTOR

## 2021-06-01 PROCEDURE — 98941 CHIROPRACT MANJ 3-4 REGIONS: CPT | Performed by: CHIROPRACTOR

## 2021-06-01 NOTE — PROGRESS NOTES
region    Segmental and somatic dysfunction of lumbar region    Cervicalgia    Chronic bilateral low back pain without sciatica    Chronic bilateral thoracic back pain    Bilateral ankle pain, unspecified chronicity        Treatment Plan:    Continued with diversified manipulation today to the listed cervical, thoracic and lumbar segments. Tolerated well. Continue with home-based self-care. He did note that his ankle pains persist.  Specifically noting right worse than left crepitus with repeated dorsiflexion and plantar flexion. I will make referral to podiatry today for further evaluation.       Seen By:  Ella Bowens DC

## 2021-06-07 ENCOUNTER — OFFICE VISIT (OUTPATIENT)
Dept: FAMILY MEDICINE CLINIC | Age: 26
End: 2021-06-07
Payer: COMMERCIAL

## 2021-06-07 VITALS
RESPIRATION RATE: 16 BRPM | WEIGHT: 109.2 LBS | OXYGEN SATURATION: 98 % | TEMPERATURE: 98.4 F | BODY MASS INDEX: 21.44 KG/M2 | HEIGHT: 60 IN | SYSTOLIC BLOOD PRESSURE: 90 MMHG | DIASTOLIC BLOOD PRESSURE: 64 MMHG | HEART RATE: 81 BPM

## 2021-06-07 DIAGNOSIS — L08.9 SKIN INFECTION: ICD-10-CM

## 2021-06-07 DIAGNOSIS — K59.00 CONSTIPATION, UNSPECIFIED CONSTIPATION TYPE: Primary | ICD-10-CM

## 2021-06-07 PROCEDURE — G8427 DOCREV CUR MEDS BY ELIG CLIN: HCPCS | Performed by: STUDENT IN AN ORGANIZED HEALTH CARE EDUCATION/TRAINING PROGRAM

## 2021-06-07 PROCEDURE — 99213 OFFICE O/P EST LOW 20 MIN: CPT | Performed by: STUDENT IN AN ORGANIZED HEALTH CARE EDUCATION/TRAINING PROGRAM

## 2021-06-07 PROCEDURE — G8420 CALC BMI NORM PARAMETERS: HCPCS | Performed by: STUDENT IN AN ORGANIZED HEALTH CARE EDUCATION/TRAINING PROGRAM

## 2021-06-07 PROCEDURE — 1036F TOBACCO NON-USER: CPT | Performed by: STUDENT IN AN ORGANIZED HEALTH CARE EDUCATION/TRAINING PROGRAM

## 2021-06-07 RX ORDER — SULFAMETHOXAZOLE AND TRIMETHOPRIM 800; 160 MG/1; MG/1
1 TABLET ORAL 2 TIMES DAILY
Qty: 14 TABLET | Refills: 0 | Status: SHIPPED | OUTPATIENT
Start: 2021-06-07 | End: 2021-06-14

## 2021-06-07 SDOH — ECONOMIC STABILITY: FOOD INSECURITY: WITHIN THE PAST 12 MONTHS, THE FOOD YOU BOUGHT JUST DIDN'T LAST AND YOU DIDN'T HAVE MONEY TO GET MORE.: NEVER TRUE

## 2021-06-07 SDOH — ECONOMIC STABILITY: FOOD INSECURITY: WITHIN THE PAST 12 MONTHS, YOU WORRIED THAT YOUR FOOD WOULD RUN OUT BEFORE YOU GOT MONEY TO BUY MORE.: NEVER TRUE

## 2021-06-07 ASSESSMENT — ENCOUNTER SYMPTOMS
BACK PAIN: 0
EYE REDNESS: 0
SINUS PRESSURE: 0
VOMITING: 0
CONSTIPATION: 1
ABDOMINAL PAIN: 1
COUGH: 0
SHORTNESS OF BREATH: 0
RHINORRHEA: 0
SORE THROAT: 0
DIARRHEA: 0
EYE PAIN: 0
NAUSEA: 0
SINUS PAIN: 0

## 2021-06-07 ASSESSMENT — SOCIAL DETERMINANTS OF HEALTH (SDOH): HOW HARD IS IT FOR YOU TO PAY FOR THE VERY BASICS LIKE FOOD, HOUSING, MEDICAL CARE, AND HEATING?: SOMEWHAT HARD

## 2021-06-07 NOTE — PROGRESS NOTES
Genitourinary: Negative for difficulty urinating and dysuria. Musculoskeletal: Negative for back pain, myalgias and neck pain. Skin: Negative for rash. Neurological: Negative for dizziness, light-headedness and numbness. Psychiatric/Behavioral: The patient is not nervous/anxious. Prior to Visit Medications    Medication Sig Taking? Authorizing Provider   sulfamethoxazole-trimethoprim (BACTRIM DS;SEPTRA DS) 800-160 MG per tablet Take 1 tablet by mouth 2 times daily for 7 days Yes Michelle Noel DO   atomoxetine (STRATTERA) 40 MG capsule Take 1 capsule by mouth daily Yes Historical Provider, MD   fluticasone (FLONASE) 50 MCG/ACT nasal spray 2 sprays by Each Nostril route daily Yes Michelle Noel DO   propranolol (INDERAL) 10 MG tablet Take 1 tablet by mouth 3 times daily Yes Michelle Noel DO   PARoxetine (PAXIL) 10 MG tablet Take 1 tablet by mouth daily Yes Historical Provider, MD   medroxyPROGESTERone (DEPO-PROVERA) 150 MG/ML injection Inject 150 mg into the muscle every 3 months Yes Historical Provider, MD   esomeprazole ([address]) 40 MG delayed release capsule TAKE 1 CAPSULE BY MOUTH EVERY DAY IN THE MORNING BEFORE BREAKFAST WITH WATER Yes Historical Provider, MD   buPROPion (WELLBUTRIN XL) 150 MG extended release tablet Take 150 mg by mouth every morning Yes Historical Provider, MD   vitamin D (ERGOCALCIFEROL) 1.25 MG (92040 UT) CAPS capsule TAKE ONE CAPSULE ONE TIME A WEEK Yes Historical Provider, MD        No Known Allergies    Past Medical History:   Diagnosis Date    Acid reflux     Anxiety     Depression     Excessive physiologic tremor 3/29/2021    Gender dysphoria in adult 3/29/2021    Sleep disturbance 3/29/2021    Tremor 3/29/2021    Vitamin D deficiency        History reviewed. No pertinent surgical history.     Social History     Socioeconomic History    Marital status: Single     Spouse name: Not on file    Number of children: Not on file    Years of education: Not on file  Highest education level: Not on file   Occupational History    Not on file   Tobacco Use    Smoking status: Never Smoker    Smokeless tobacco: Never Used   Substance and Sexual Activity    Alcohol use: Never    Drug use: Not on file     Comment: last used in September 2020    Sexual activity: Yes   Other Topics Concern    Not on file   Social History Narrative    Not on file     Social Determinants of Health     Financial Resource Strain: Medium Risk    Difficulty of Paying Living Expenses: Somewhat hard   Food Insecurity: No Food Insecurity    Worried About Running Out of Food in the Last Year: Never true    Jackelyn of Food in the Last Year: Never true   Transportation Needs:     Lack of Transportation (Medical):      Lack of Transportation (Non-Medical):    Physical Activity:     Days of Exercise per Week:     Minutes of Exercise per Session:    Stress:     Feeling of Stress :    Social Connections:     Frequency of Communication with Friends and Family:     Frequency of Social Gatherings with Friends and Family:     Attends [employer] Services:     Active Member of Clubs or Organizations:     Attends Club or Organization Meetings:     Marital Status:    Intimate Partner Violence:     Fear of Current or Ex-Partner:     Emotionally Abused:     Physically Abused:     Sexually Abused:         Family History   Problem Relation Age of Onset    Hypothyroidism Mother     High Blood Pressure Father     Depression Maternal Uncle     Asthma Paternal Aunt     High Blood Pressure Maternal Grandmother     Hypothyroidism Maternal Grandmother     Cancer Maternal Grandfather     Stroke Paternal Grandmother     Heart Attack Paternal Grandfather     Irritable Bowel Syndrome Sister            Vitals:    06/07/21 1526   BP: 90/64   Pulse: 81   Resp: 16   Temp: 98.4 °F (36.9 °C)   TempSrc: Oral   SpO2: 98%   Weight: 109 lb 3.2 oz (49.5 kg)   Height: 5' (1.524 m)     Estimated body mass index is 21.33 kg/m² as calculated from the following:    Height as of this encounter: 5' (1.524 m). Weight as of this encounter: 109 lb 3.2 oz (49.5 kg). Most Recent Labs  CBC  Lab Results   Component Value Date    WBC 8.6 03/02/2021    RBC 4.57 03/02/2021    HGB 13.8 03/02/2021    HCT 43.1 03/02/2021    MCV 94.3 03/02/2021     03/02/2021      CMP  Lab Results   Component Value Date     03/02/2021    K 4.7 03/02/2021     03/02/2021    CO2 24 03/02/2021    ANIONGAP 10 03/02/2021    GLUCOSE 86 03/02/2021    BUN 9 03/02/2021    CREATININE 0.8 03/02/2021    LABGLOM >60 03/02/2021    GFRAA >60 03/02/2021    CALCIUM 9.1 03/02/2021    PROT 7.3 03/02/2021    LABALBU 4.5 03/02/2021    BILITOT 0.3 03/02/2021    ALKPHOS 50 03/02/2021    AST 16 03/02/2021    ALT 20 03/02/2021     TSH  Lab Results   Component Value Date    TSH 1.990 03/02/2021     LIPID  Lab Results   Component Value Date    CHOL 134 03/16/2021    HDL 44 03/16/2021    LDLCALC 82 03/16/2021    TRIG 38 03/16/2021     VITAMIN D  Lab Results   Component Value Date    VITD25 38 02/15/2021         Physical Exam  Vitals and nursing note reviewed. Constitutional:       Appearance: Normal appearance. HENT:      Head: Normocephalic and atraumatic. Right Ear: Tympanic membrane, ear canal and external ear normal.      Left Ear: Tympanic membrane, ear canal and external ear normal.      Nose: Nose normal.      Mouth/Throat:      Mouth: Mucous membranes are moist.   Eyes:      Extraocular Movements: Extraocular movements intact. Pupils: Pupils are equal, round, and reactive to light. Cardiovascular:      Rate and Rhythm: Normal rate and regular rhythm. Pulmonary:      Effort: Pulmonary effort is normal.      Breath sounds: Normal breath sounds. Abdominal:      General: Abdomen is flat. There is no distension. Palpations: Abdomen is soft. Tenderness: There is no abdominal tenderness. There is no guarding or rebound. Musculoskeletal:      Cervical back: Normal range of motion. Skin:     Comments: Purulent drainage noted above right eye where piercing is- pimple/white head vs infection   Neurological:      Mental Status: He is alert. ASSESSMENT/PLAN:  Michael Wesley was seen today for constipation. Diagnoses and all orders for this visit:    Constipation, unspecified constipation type    Skin infection  -     sulfamethoxazole-trimethoprim (BACTRIM DS;SEPTRA DS) 800-160 MG per tablet; Take 1 tablet by mouth 2 times daily for 7 days       Will give patient samples of miralax. He wants to try to see if it works before getting a prescription sent in. Will send to gi in the future if needed  Increase fluids and fiber in the diet. Side effects of medications discussed     Educational materials and/or home exercises printed for patient's review and were included in patient instructions on his/her After Visit Summary and given to patient at the end of visit. Counseled regarding above diagnosis, including possible risks and complications,  especially if left uncontrolled. Counseled regarding the possible side effects, risks, benefits and alternatives to treatment; patient and/or guardian verbalizes understanding, agrees, feels comfortable with and wishes to proceed with above treatment plan. Advised patient to call with any new medication issues, and read all Rx info from pharmacy to assure aware of all possible risks and side effects of medication before taking. Reviewed age and gender appropriate health screening exams and vaccinations. Advised patient regarding importance of keeping up with recommended health maintenance and to schedule as soon as possible if overdue, as this is important in assessing for undiagnosed pathology, especially cancer, as well as protecting against potentially harmful/life threatening disease.        Patient and/or guardian verbalizes understanding and agrees with above

## 2021-06-14 DIAGNOSIS — R25.1 EXCESSIVE PHYSIOLOGIC TREMOR: Primary | ICD-10-CM

## 2021-06-14 RX ORDER — PRIMIDONE 50 MG/1
TABLET ORAL
Qty: 30 TABLET | Refills: 3 | Status: SHIPPED
Start: 2021-06-14 | End: 2021-09-20 | Stop reason: SDUPTHER

## 2021-06-15 ENCOUNTER — OFFICE VISIT (OUTPATIENT)
Dept: CHIROPRACTIC MEDICINE | Age: 26
End: 2021-06-15
Payer: COMMERCIAL

## 2021-06-15 VITALS — TEMPERATURE: 97.9 F | OXYGEN SATURATION: 93 % | HEART RATE: 70 BPM | RESPIRATION RATE: 14 BRPM

## 2021-06-15 DIAGNOSIS — M99.02 SEGMENTAL AND SOMATIC DYSFUNCTION OF THORACIC REGION: ICD-10-CM

## 2021-06-15 DIAGNOSIS — M99.01 SEGMENTAL AND SOMATIC DYSFUNCTION OF CERVICAL REGION: Primary | ICD-10-CM

## 2021-06-15 DIAGNOSIS — G89.29 CHRONIC BILATERAL LOW BACK PAIN WITHOUT SCIATICA: ICD-10-CM

## 2021-06-15 DIAGNOSIS — G89.29 CHRONIC BILATERAL THORACIC BACK PAIN: ICD-10-CM

## 2021-06-15 DIAGNOSIS — M99.03 SEGMENTAL AND SOMATIC DYSFUNCTION OF LUMBAR REGION: ICD-10-CM

## 2021-06-15 DIAGNOSIS — M54.50 CHRONIC BILATERAL LOW BACK PAIN WITHOUT SCIATICA: ICD-10-CM

## 2021-06-15 DIAGNOSIS — M62.830 MUSCLE SPASM OF BACK: ICD-10-CM

## 2021-06-15 DIAGNOSIS — M54.6 CHRONIC BILATERAL THORACIC BACK PAIN: ICD-10-CM

## 2021-06-15 DIAGNOSIS — M54.2 CERVICALGIA: ICD-10-CM

## 2021-06-15 PROCEDURE — 98941 CHIROPRACT MANJ 3-4 REGIONS: CPT | Performed by: CHIROPRACTOR

## 2021-06-16 DIAGNOSIS — N89.8 VAGINAL DISCHARGE: Primary | ICD-10-CM

## 2021-06-17 ENCOUNTER — OFFICE VISIT (OUTPATIENT)
Dept: PODIATRY | Age: 26
End: 2021-06-17
Payer: COMMERCIAL

## 2021-06-17 VITALS
TEMPERATURE: 98.2 F | DIASTOLIC BLOOD PRESSURE: 78 MMHG | BODY MASS INDEX: 22.07 KG/M2 | SYSTOLIC BLOOD PRESSURE: 106 MMHG | WEIGHT: 113 LBS

## 2021-06-17 DIAGNOSIS — M25.571 ACUTE RIGHT ANKLE PAIN: Primary | ICD-10-CM

## 2021-06-17 DIAGNOSIS — M25.572 ACUTE LEFT ANKLE PAIN: ICD-10-CM

## 2021-06-17 PROCEDURE — G8427 DOCREV CUR MEDS BY ELIG CLIN: HCPCS | Performed by: PODIATRIST

## 2021-06-17 PROCEDURE — 99203 OFFICE O/P NEW LOW 30 MIN: CPT | Performed by: PODIATRIST

## 2021-06-17 PROCEDURE — G8420 CALC BMI NORM PARAMETERS: HCPCS | Performed by: PODIATRIST

## 2021-06-17 PROCEDURE — 1036F TOBACCO NON-USER: CPT | Performed by: PODIATRIST

## 2021-06-17 NOTE — PROGRESS NOTES
Foot Exam    General  General Appearance: appears stated age and healthy   Orientation: alert and oriented to person, place, and time       Right Foot/Ankle     Inspection and Palpation  Ecchymosis: none  Tenderness: none   Swelling: none   Skin Exam: skin intact; Neurovascular  Dorsalis pedis: 3+  Posterior tibial: 3+  Saphenous nerve sensation: normal  Tibial nerve sensation: normal  Superficial peroneal nerve sensation: normal  Deep peroneal nerve sensation: normal  Sural nerve sensation: normal  Achilles reflex: 2+  Babinski reflex: 2+    Muscle Strength  Ankle dorsiflexion: 5  Ankle plantar flexion: 5  Ankle inversion: 5  Ankle eversion: 5  Great toe extension: 5  Great toe flexion: 5    Range of Motion    Normal right ankle ROM      Left Foot/Ankle      Inspection and Palpation  Ecchymosis: none  Swelling: none   Skin Exam: skin intact; Neurovascular  Dorsalis pedis: 3+  Posterior tibial: 3+  Saphenous nerve sensation: normal  Tibial nerve sensation: normal  Superficial peroneal nerve sensation: normal  Deep peroneal nerve sensation: normal  Sural nerve sensation: normal  Achilles reflex: 2+  Babinski reflex: 2+    Muscle Strength  Ankle dorsiflexion: 5  Ankle plantar flexion: 5  Ankle inversion: 5  Ankle eversion: 5  Great toe extension: 5  Great toe flexion: 5    Range of Motion    Normal left ankle ROM          Right Ankle Exam     Range of Motion   The patient has normal right ankle ROM. Muscle Strength   Dorsiflexion:  5/5  Plantar flexion:  5/5      Left Ankle Exam     Range of Motion   The patient has normal left ankle ROM. Muscle Strength   The patient has normal left ankle strength. Dorsiflexion:  5/5   Plantar flexion:  5/5             General: AAO x 3 in NAD. Dermatologic Exam:  Skin lesion/ulceration Absent . Skin No rashes or nodules noted. .   Musculoskeletal:   Pes planus noted during ambulation.   1st MPJ ROM within normal limits, Bilateral.    Ankle ROM within normal limits,Bilateral.   HEEL PAIN neg     TARSAL TUNNEL PAIN  neg    Vascular:     Radiographs:  3 views XR ANKLE LEFT (MIN 3 VIEWS)    Result Date: 6/17/2021  EXAMINATION: THREE XRAY VIEWS OF THE RIGHT ANKLE; THREE XRAY VIEWS OF THE LEFT ANKLE 6/17/2021 11:24 am; 6/17/2021 11:23 am COMPARISON: None. HISTORY: ORDERING SYSTEM PROVIDED HISTORY: Acute right ankle pain TECHNOLOGIST PROVIDED HISTORY: Reason for exam:->wb; ORDERING SYSTEM PROVIDED HISTORY: Acute right ankle pain FINDINGS: Right ankle: No mortise widening, fracture or dislocation. Normal soft tissues. Left ankle: No mortise widening, fracture or dislocation. Normal soft tissues. No significant abnormal findings at either ankle. XR ANKLE RIGHT (MIN 3 VIEWS)    Result Date: 6/17/2021  EXAMINATION: THREE XRAY VIEWS OF THE RIGHT ANKLE; THREE XRAY VIEWS OF THE LEFT ANKLE 6/17/2021 11:24 am; 6/17/2021 11:23 am COMPARISON: None. HISTORY: ORDERING SYSTEM PROVIDED HISTORY: Acute right ankle pain TECHNOLOGIST PROVIDED HISTORY: Reason for exam:->wb; ORDERING SYSTEM PROVIDED HISTORY: Acute right ankle pain FINDINGS: Right ankle: No mortise widening, fracture or dislocation. Normal soft tissues. Left ankle: No mortise widening, fracture or dislocation. Normal soft tissues. No significant abnormal findings at either ankle. foot/ankle:     Asessment: Patient is a 26 y.o. adult with:    Diagnosis Orders   1. Acute right ankle pain  XR ANKLE RIGHT (MIN 3 VIEWS)    XR ANKLE LEFT (MIN 3 VIEWS)   2. Acute left ankle pain  XR ANKLE RIGHT (MIN 3 VIEWS)    XR ANKLE LEFT (MIN 3 VIEWS)       Plan: Patient examined and evaluated. Current condition and treatment options discussed in detail. Discussed conservative and surgical options with the patient. Treatment options today consisted of verbal and written instructions given to patient. Contact office with any questions/problems/concerns.   After reviewing the x-rays and physical examination patient probably has

## 2021-06-17 NOTE — LETTER
[address] [address]  Phone: [phone]  Fax: Germain Burgos, [id number]      June 17, 2021     Patient: Kartik Javed   MR Number: [id number]   YOB: 1995   Date of Visit: 6/17/2021       Dear Dr. Dinora Quiroga:    Thank you for referring Kartik Javed to me for evaluation/treatment. Below are the relevant portions of my assessment and plan of care. If you have questions, please do not hesitate to call me. I look forward to following Leonora along with you. Sincerely,    HIGINIO Torres DPM    CC providers:   [phone]  [address] [facility] [address]

## 2021-06-21 ENCOUNTER — OFFICE VISIT (OUTPATIENT)
Dept: CHIROPRACTIC MEDICINE | Age: 26
End: 2021-06-21
Payer: COMMERCIAL

## 2021-06-21 VITALS — RESPIRATION RATE: 16 BRPM | OXYGEN SATURATION: 95 % | HEART RATE: 91 BPM | TEMPERATURE: 98 F

## 2021-06-21 DIAGNOSIS — M54.50 CHRONIC BILATERAL LOW BACK PAIN WITHOUT SCIATICA: ICD-10-CM

## 2021-06-21 DIAGNOSIS — M54.2 CERVICALGIA: ICD-10-CM

## 2021-06-21 DIAGNOSIS — M54.6 CHRONIC BILATERAL THORACIC BACK PAIN: ICD-10-CM

## 2021-06-21 DIAGNOSIS — M99.01 SEGMENTAL AND SOMATIC DYSFUNCTION OF CERVICAL REGION: Primary | ICD-10-CM

## 2021-06-21 DIAGNOSIS — M62.830 MUSCLE SPASM OF BACK: ICD-10-CM

## 2021-06-21 DIAGNOSIS — G89.29 CHRONIC BILATERAL THORACIC BACK PAIN: ICD-10-CM

## 2021-06-21 DIAGNOSIS — M99.03 SEGMENTAL AND SOMATIC DYSFUNCTION OF LUMBAR REGION: ICD-10-CM

## 2021-06-21 DIAGNOSIS — M99.02 SEGMENTAL AND SOMATIC DYSFUNCTION OF THORACIC REGION: ICD-10-CM

## 2021-06-21 DIAGNOSIS — G89.29 CHRONIC BILATERAL LOW BACK PAIN WITHOUT SCIATICA: ICD-10-CM

## 2021-06-21 PROCEDURE — 98941 CHIROPRACT MANJ 3-4 REGIONS: CPT | Performed by: CHIROPRACTOR

## 2021-06-21 NOTE — PROGRESS NOTES
21  Nandini Ace : 1995 Sex: adult  Age: 26 y.o. Chief Complaint   Patient presents with    Neck Pain       HPI:   Pain is has improved. On average, pain is perceived as mild (1-3  pain scale). Change in quality of symptoms: no.  He denies any other symptoms. Did well with his busy work week. Mild pain in the interscapular region primarily today    She did see podiatry-supposed to have custom molded orthotics created in the coming weeks. Current Outpatient Medications:     primidone (MYSOLINE) 50 MG tablet, Take 1/2 tablet at night, Disp: 30 tablet, Rfl: 3    atomoxetine (STRATTERA) 40 MG capsule, Take 1 capsule by mouth daily, Disp: , Rfl:     fluticasone (FLONASE) 50 MCG/ACT nasal spray, 2 sprays by Each Nostril route daily, Disp: 3 Bottle, Rfl: 1    propranolol (INDERAL) 10 MG tablet, Take 1 tablet by mouth 3 times daily (Patient not taking: Reported on 2021), Disp: 90 tablet, Rfl: 3    PARoxetine (PAXIL) 10 MG tablet, Take 1 tablet by mouth daily, Disp: , Rfl:     medroxyPROGESTERone (DEPO-PROVERA) 150 MG/ML injection, Inject 150 mg into the muscle every 3 months, Disp: , Rfl:     esomeprazole (NEXIUM) 40 MG delayed release capsule, TAKE 1 CAPSULE BY MOUTH EVERY DAY IN THE MORNING BEFORE BREAKFAST WITH WATER, Disp: , Rfl:     buPROPion (WELLBUTRIN XL) 150 MG extended release tablet, Take 150 mg by mouth every morning, Disp: , Rfl:     vitamin D (ERGOCALCIFEROL) 1.25 MG (31494 UT) CAPS capsule, TAKE ONE CAPSULE ONE TIME A WEEK, Disp: , Rfl:     Exam:   Vitals:    21 1406   Pulse: 91   Resp: 16   Temp: 98 °F (36.7 °C)   SpO2: 95%     Active trigger points in the bilateral trapezius, bilateral rhomboid group. No midline pain. Poor seated posture,/position. Upon correction, back pain is relieved    There are hypertonic and tender fibers noted today in the thoracic and lumbar paraspinal muscles.  Joint fixation is noted with motion screening at C7-T1, T4-6, T12-L2. Mayda Officer was seen today for neck pain. Diagnoses and all orders for this visit:    Segmental and somatic dysfunction of cervical region    Segmental and somatic dysfunction of thoracic region    Segmental and somatic dysfunction of lumbar region    Cervicalgia    Chronic bilateral low back pain without sciatica    Chronic bilateral thoracic back pain    Muscle spasm of back        Treatment Plan: Continue diversified manipulation today to the listed cervical, thoracic and lumbar segments. Tolerated well. Follow-up 1 week or as needed.   He would like to do 1 week due to his busy schedule at work      Seen By:  Bonny Gorman

## 2021-06-25 ENCOUNTER — TELEPHONE (OUTPATIENT)
Dept: PODIATRY | Age: 26
End: 2021-06-25

## 2021-06-25 NOTE — TELEPHONE ENCOUNTER
Kulwinder Silva, [facility] to check benefits on custom orthotics [id number]. Spoke with [place]    Ref# [id number]    Orthotics are Covered @ 100%   Allowed 1 per foot per 2 years.      Pre-cert needed: No    Casted for orthotics: No    Billed out through insurance: No

## 2021-07-06 ENCOUNTER — OFFICE VISIT (OUTPATIENT)
Dept: FAMILY MEDICINE CLINIC | Age: 26
End: 2021-07-06
Payer: COMMERCIAL

## 2021-07-06 VITALS
RESPIRATION RATE: 16 BRPM | HEIGHT: 60 IN | BODY MASS INDEX: 21.64 KG/M2 | SYSTOLIC BLOOD PRESSURE: 100 MMHG | DIASTOLIC BLOOD PRESSURE: 66 MMHG | HEART RATE: 85 BPM | TEMPERATURE: 97.1 F | WEIGHT: 110.2 LBS | OXYGEN SATURATION: 99 %

## 2021-07-06 DIAGNOSIS — G89.29 CHRONIC PAIN OF BOTH KNEES: ICD-10-CM

## 2021-07-06 DIAGNOSIS — M25.561 CHRONIC PAIN OF BOTH KNEES: ICD-10-CM

## 2021-07-06 DIAGNOSIS — E55.9 VITAMIN D DEFICIENCY: Primary | ICD-10-CM

## 2021-07-06 DIAGNOSIS — M25.562 CHRONIC PAIN OF BOTH KNEES: ICD-10-CM

## 2021-07-06 DIAGNOSIS — R25.1 TREMOR: ICD-10-CM

## 2021-07-06 DIAGNOSIS — K59.00 CONSTIPATION, UNSPECIFIED CONSTIPATION TYPE: ICD-10-CM

## 2021-07-06 PROCEDURE — 1036F TOBACCO NON-USER: CPT | Performed by: STUDENT IN AN ORGANIZED HEALTH CARE EDUCATION/TRAINING PROGRAM

## 2021-07-06 PROCEDURE — 99213 OFFICE O/P EST LOW 20 MIN: CPT | Performed by: STUDENT IN AN ORGANIZED HEALTH CARE EDUCATION/TRAINING PROGRAM

## 2021-07-06 PROCEDURE — G8420 CALC BMI NORM PARAMETERS: HCPCS | Performed by: STUDENT IN AN ORGANIZED HEALTH CARE EDUCATION/TRAINING PROGRAM

## 2021-07-06 PROCEDURE — G8427 DOCREV CUR MEDS BY ELIG CLIN: HCPCS | Performed by: STUDENT IN AN ORGANIZED HEALTH CARE EDUCATION/TRAINING PROGRAM

## 2021-07-06 RX ORDER — ERGOCALCIFEROL 1.25 MG/1
CAPSULE ORAL
Qty: 4 CAPSULE | Refills: 3 | Status: SHIPPED
Start: 2021-07-06 | End: 2021-11-23 | Stop reason: SDUPTHER

## 2021-07-06 ASSESSMENT — ENCOUNTER SYMPTOMS
SHORTNESS OF BREATH: 0
CHEST TIGHTNESS: 0
BACK PAIN: 0
VOMITING: 0
NAUSEA: 0
RHINORRHEA: 0
CONSTIPATION: 1
EYE PAIN: 0
ABDOMINAL PAIN: 0
DIARRHEA: 0
COUGH: 0

## 2021-07-06 NOTE — PROGRESS NOTES
2021    Winnie Good (:  1995) is a 26 y.o. adult, here for evaluation of the following medical concerns:    HPI  Chief Complaint   Patient presents with    Constipation     6 month follow up. Still having a problem with the constipation.  Tremors     Has not changed but seeing the neurologist for it.  Knee Pain     Bilateral pain for the past 6 years. Patient is a 26year old male here today for a 6 month follow up. He is still having constipation which was getting better with the miralax but he was unsure if he should take it daily. He did run out of it but was using it as needed. His last bowel movement was yesterday he thinks. He has not noticed any blood with the stools. He is going to see OBGYN at the [address] [place] [address] on Thursday. They are going to switch birth controls. They are going to switch to the copper IUD. Patient still having issues with the tremor. We will increase the primidone at this time. He sees the psychiatrist tomorrow. He is going to talk to them about the [place] and the side effects. He is having knee pain for 6 years but the last week it has gotten worse. He saw PT and had an MRI done back in . He saw a second PT in 2019. He was told the cartilage was down and the bones were rubbing together. He does PT when it is bad. He was told to do it everyday but ADHD and depression inhibit. She would like a script for a wheelchair. Review of Systems   Constitutional: Negative for chills, fatigue and fever. HENT: Negative for congestion, ear pain, postnasal drip and rhinorrhea. Eyes: Negative for pain. Respiratory: Negative for cough, chest tightness and shortness of breath. Cardiovascular: Negative for chest pain. Gastrointestinal: Positive for constipation. Negative for abdominal pain, diarrhea, nausea and vomiting. Genitourinary: Negative for difficulty urinating, dysuria and frequency.    Musculoskeletal: Positive for arthralgias. Negative for back pain and myalgias. Skin: Negative for rash. Neurological: Positive for tremors. Negative for dizziness, light-headedness, numbness and headaches. Prior to Visit Medications    Medication Sig Taking? Authorizing Provider   vitamin D (ERGOCALCIFEROL) 1.25 MG (79011 UT) CAPS capsule TAKE ONE CAPSULE ONE TIME A WEEK Yes Michelle Noel DO   bisacodyl (DULCOLAX) 5 MG EC tablet Take 1 tablet by mouth daily as needed for Constipation Yes Michelle Noel DO   primidone (MYSOLINE) 50 MG tablet Take 1/2 tablet at night Yes Michelle Noel DO   atomoxetine (STRATTERA) 40 MG capsule Take 1 capsule by mouth daily Yes Historical Provider, MD   fluticasone (FLONASE) 50 MCG/ACT nasal spray 2 sprays by Each Nostril route daily Yes Michelle Noel DO   PARoxetine (PAXIL) 10 MG tablet Take 1 tablet by mouth daily Yes Historical Provider, MD   esomeprazole ([address]) 40 MG delayed release capsule TAKE 1 CAPSULE BY MOUTH EVERY DAY IN THE MORNING BEFORE BREAKFAST WITH WATER Yes Historical Provider, MD   buPROPion (WELLBUTRIN XL) 150 MG extended release tablet Take 150 mg by mouth every morning Yes Historical Provider, MD        No Known Allergies    Past Medical History:   Diagnosis Date    Acid reflux     Anxiety     Depression     Excessive physiologic tremor 3/29/2021    Gender dysphoria in adult 3/29/2021    Sleep disturbance 3/29/2021    Tremor 3/29/2021    Vitamin D deficiency        History reviewed. No pertinent surgical history.     Social History     Socioeconomic History    Marital status: Single     Spouse name: Not on file    Number of children: Not on file    Years of education: Not on file    Highest education level: Not on file   Occupational History    Not on file   Tobacco Use    Smoking status: Never Smoker    Smokeless tobacco: Never Used   Substance and Sexual Activity    Alcohol use: Never    Drug use: Not on file     Comment: last used in September 2020    Sexual activity: Yes   Other Topics Concern    Not on file   Social History Narrative    Not on file     Social Determinants of Health     Financial Resource Strain: Medium Risk    Difficulty of Paying Living Expenses: Somewhat hard   Food Insecurity: No Food Insecurity    Worried About Running Out of Food in the Last Year: Never true    Jackelyn of Food in the Last Year: Never true   Transportation Needs:     Lack of Transportation (Medical):  Lack of Transportation (Non-Medical):    Physical Activity:     Days of Exercise per Week:     Minutes of Exercise per Session:    Stress:     Feeling of Stress :    Social Connections:     Frequency of Communication with Friends and Family:     Frequency of Social Gatherings with Friends and Family:     Attends Worship Services:     Active Member of Clubs or Organizations:     Attends Club or Organization Meetings:     Marital Status:    Intimate Partner Violence:     Fear of Current or Ex-Partner:     Emotionally Abused:     Physically Abused:     Sexually Abused:         Family History   Problem Relation Age of Onset    Hypothyroidism Mother     High Blood Pressure Father     Depression Maternal Uncle     Asthma Paternal Aunt     High Blood Pressure Maternal Grandmother     Hypothyroidism Maternal Grandmother     Cancer Maternal Grandfather     Stroke Paternal Grandmother     Heart Attack Paternal Grandfather     Irritable Bowel Syndrome Sister            Vitals:    07/06/21 1235   BP: 100/66   Pulse: 85   Resp: 16   Temp: 97.1 °F (36.2 °C)   TempSrc: Temporal   SpO2: 99%   Weight: 110 lb 3.2 oz (50 kg)   Height: 5' (1.524 m)     Estimated body mass index is 21.52 kg/m² as calculated from the following:    Height as of this encounter: 5' (1.524 m). Weight as of this encounter: 110 lb 3.2 oz (50 kg).     Most Recent Labs  CBC  Lab Results   Component Value Date    WBC 8.6 03/02/2021    RBC 4.57 03/02/2021    HGB 13.8 03/02/2021    HCT 43.1 03/02/2021    MCV 94.3 03/02/2021     03/02/2021      CMP  Lab Results   Component Value Date     03/02/2021    K 4.7 03/02/2021     03/02/2021    CO2 24 03/02/2021    ANIONGAP 10 03/02/2021    GLUCOSE 86 03/02/2021    BUN 9 03/02/2021    CREATININE 0.8 03/02/2021    LABGLOM >60 03/02/2021    GFRAA >60 03/02/2021    CALCIUM 9.1 03/02/2021    PROT 7.3 03/02/2021    LABALBU 4.5 03/02/2021    BILITOT 0.3 03/02/2021    ALKPHOS 50 03/02/2021    AST 16 03/02/2021    ALT 20 03/02/2021     TSH  Lab Results   Component Value Date    TSH 1.990 03/02/2021     LIPID  Lab Results   Component Value Date    CHOL 134 03/16/2021    HDL 44 03/16/2021    LDLCALC 82 03/16/2021    TRIG 38 03/16/2021     VITAMIN D  Lab Results   Component Value Date    VITD25 38 02/15/2021       Physical Exam  Vitals and nursing note reviewed. Constitutional:       Appearance: Normal appearance. HENT:      Head: Normocephalic and atraumatic. Right Ear: Tympanic membrane, ear canal and external ear normal.      Left Ear: Tympanic membrane, ear canal and external ear normal.      Nose: Nose normal.      Mouth/Throat:      Mouth: Mucous membranes are moist.      Pharynx: Oropharynx is clear. Eyes:      Extraocular Movements: Extraocular movements intact. Conjunctiva/sclera: Conjunctivae normal.      Pupils: Pupils are equal, round, and reactive to light. Cardiovascular:      Rate and Rhythm: Normal rate and regular rhythm. Pulses: Normal pulses. Heart sounds: Normal heart sounds. Pulmonary:      Effort: Pulmonary effort is normal.      Breath sounds: Normal breath sounds. Abdominal:      General: Bowel sounds are normal.      Palpations: Abdomen is soft. Musculoskeletal:         General: Normal range of motion. Cervical back: Normal range of motion and neck supple. Skin:     General: Skin is warm and dry. Capillary Refill: Capillary refill takes less than 2 seconds.    Neurological: General: No focal deficit present. Mental Status: He is alert and oriented to person, place, and time. Psychiatric:         Mood and Affect: Mood normal.         Behavior: Behavior normal.         Thought Content: Thought content normal.         ASSESSMENT/PLAN:  Jeremi Gold was seen today for constipation, tremors and knee pain. Diagnoses and all orders for this visit:    Vitamin D deficiency  -     vitamin D (ERGOCALCIFEROL) 1.25 MG (51430 UT) CAPS capsule; TAKE ONE CAPSULE ONE TIME A WEEK    Chronic pain of both knees  -     MRI KNEE LEFT WO CONTRAST; Future  -     MRI KNEE RIGHT WO CONTRAST; Future  -     Wheelchair    Tremor    Constipation, unspecified constipation type  -     bisacodyl (DULCOLAX) 5 MG EC tablet; Take 1 tablet by mouth daily as needed for Constipation       Educational materials and/or home exercises printed for patient's review and were included in patient instructions on his/her After Visit Summary and given to patient at the end of visit. Counseled regarding above diagnosis, including possible risks and complications,  especially if left uncontrolled. Counseled regarding the possible side effects, risks, benefits and alternatives to treatment; patient and/or guardian verbalizes understanding, agrees, feels comfortable with and wishes to proceed with above treatment plan. Advised patient to call with any new medication issues, and read all Rx info from pharmacy to assure aware of all possible risks and side effects of medication before taking. Reviewed age and gender appropriate health screening exams and vaccinations. Advised patient regarding importance of keeping up with recommended health maintenance and to schedule as soon as possible if overdue, as this is important in assessing for undiagnosed pathology, especially cancer, as well as protecting against potentially harmful/life threatening disease.        Patient and/or guardian verbalizes understanding and agrees with above counseling, assessment and plan. All questions answered. Return in about 6 weeks (around 8/17/2021) for knee pain and constipation. An  electronic signature was used to authenticate this note.     --Otoniel Ibanez DO on 7/6/2021 at 1:09 PM

## 2021-07-08 ENCOUNTER — TELEPHONE (OUTPATIENT)
Dept: FAMILY MEDICINE CLINIC | Age: 26
End: 2021-07-08

## 2021-07-08 RX ORDER — OMEPRAZOLE 40 MG/1
40 CAPSULE, DELAYED RELEASE ORAL DAILY
Qty: 30 CAPSULE | Refills: 3 | Status: SHIPPED
Start: 2021-07-08 | End: 2021-07-26 | Stop reason: ALTCHOICE

## 2021-07-08 NOTE — TELEPHONE ENCOUNTER
Insurance is denying Nexium 40mg. They are wanting a prior auth. Would you like the prior auth done, change it to Prilosec, or pay for it OTC?  Pt has [employer]

## 2021-07-10 ENCOUNTER — HOSPITAL ENCOUNTER (EMERGENCY)
Age: 26
Discharge: HOME | End: 2021-07-11
Payer: COMMERCIAL

## 2021-07-10 VITALS
RESPIRATION RATE: 14 BRPM | OXYGEN SATURATION: 98 % | HEART RATE: 89 BPM | DIASTOLIC BLOOD PRESSURE: 80 MMHG | SYSTOLIC BLOOD PRESSURE: 114 MMHG | TEMPERATURE: 98 F

## 2021-07-10 DIAGNOSIS — R10.9 ABDOMINAL CRAMPING: Primary | ICD-10-CM

## 2021-07-10 DIAGNOSIS — Z97.5 IUD (INTRAUTERINE DEVICE) IN PLACE: ICD-10-CM

## 2021-07-10 PROCEDURE — 99283 EMERGENCY DEPT VISIT LOW MDM: CPT

## 2021-07-10 PROCEDURE — 96372 THER/PROPH/DIAG INJ SC/IM: CPT

## 2021-07-10 PROCEDURE — 6360000002 HC RX W HCPCS: Performed by: PHYSICIAN ASSISTANT

## 2021-07-10 RX ORDER — ONDANSETRON 4 MG/1
4 TABLET, ORALLY DISINTEGRATING ORAL ONCE
Status: COMPLETED | OUTPATIENT
Start: 2021-07-11 | End: 2021-07-11

## 2021-07-10 RX ORDER — OXYCODONE HYDROCHLORIDE AND ACETAMINOPHEN 5; 325 MG/1; MG/1
1 TABLET ORAL ONCE
Status: COMPLETED | OUTPATIENT
Start: 2021-07-11 | End: 2021-07-11

## 2021-07-10 RX ORDER — KETOROLAC TROMETHAMINE 30 MG/ML
30 INJECTION, SOLUTION INTRAMUSCULAR; INTRAVENOUS ONCE
Status: COMPLETED | OUTPATIENT
Start: 2021-07-10 | End: 2021-07-10

## 2021-07-10 RX ADMIN — KETOROLAC TROMETHAMINE 30 MG: 30 INJECTION, SOLUTION INTRAMUSCULAR; INTRAVENOUS at 23:20

## 2021-07-10 ASSESSMENT — PAIN SCALES - GENERAL
PAINLEVEL_OUTOF10: 10
PAINLEVEL_OUTOF10: 10

## 2021-07-11 PROCEDURE — 6370000000 HC RX 637 (ALT 250 FOR IP): Performed by: PHYSICIAN ASSISTANT

## 2021-07-11 RX ORDER — IBUPROFEN 600 MG/1
600 TABLET ORAL EVERY 8 HOURS PRN
Qty: 30 TABLET | Refills: 0 | Status: SHIPPED | OUTPATIENT
Start: 2021-07-11 | End: 2021-07-26 | Stop reason: ALTCHOICE

## 2021-07-11 RX ADMIN — ONDANSETRON 4 MG: 4 TABLET, ORALLY DISINTEGRATING ORAL at 00:10

## 2021-07-11 RX ADMIN — OXYCODONE HYDROCHLORIDE AND ACETAMINOPHEN 1 TABLET: 5; 325 TABLET ORAL at 00:10

## 2021-07-11 ASSESSMENT — PAIN DESCRIPTION - PAIN TYPE: TYPE: ACUTE PAIN

## 2021-07-11 ASSESSMENT — PAIN SCALES - GENERAL
PAINLEVEL_OUTOF10: 8
PAINLEVEL_OUTOF10: 8

## 2021-07-11 ASSESSMENT — PAIN DESCRIPTION - FREQUENCY: FREQUENCY: CONTINUOUS

## 2021-07-11 NOTE — ED PROVIDER NOTES
[address]  Department of Emergency Medicine   ED  Encounter Note  Admit Date/RoomTime: 7/10/2021  9:38 PM  ED Room: 32/32    NAME: Shamir Calderon  : 1995  MRN: [id number]     Chief Complaint:  Vaginal Bleeding (IUD PLACED IN 2 DAYS AGO AND REPORTS CRAMPING AND BLEEDING. )    History of Present Illness       Shamir Calderon is a 26 y.o. old adult who presents to the emergency department by private vehicle for vaginal bleeding and cramping after IUD placement 2 days ago. Since the onset of the symptoms patient states that her pain has been worsening with additional cramps and moderate to severe in severity. Patient states that she has no other associated symptoms just pain and abnormal bleeding. Patient states that she had IUD placed 2 days ago called the [facility]. Patient states that this is her first IUD. Patient states that she was told she could have pain and cramping but only at the level of her normal \"menstrual cramps. \"  Patient states the pain is much worse than this. Patient states that she did call but no one answered the office that she had IUD placed at. Patient states that she is concerned due to the amount of discomfort she is experiencing I would like to evaluate if the IUD is in proper placement. Patient states that she took ibuprofen once yesterday but has not continued to take it or alternate with Tylenol for the discomfort. ROS   Pertinent positives and negatives are stated within HPI, all other systems reviewed and are negative. Past Medical History:  has a past medical history of Acid reflux, Anxiety, Depression, Excessive physiologic tremor, Gender dysphoria in adult, Sleep disturbance, Tremor, and Vitamin D deficiency. Surgical History:  has no past surgical history on file. Social History:  reports that he has never smoked. He has never used smokeless tobacco.  Drug: Marijuana.  He reports that he does not drink alcohol. Family History: family history includes Asthma in his paternal aunt; Cancer in his maternal grandfather; Depression in his maternal uncle; Heart Attack in his paternal grandfather; High Blood Pressure in his father and maternal grandmother; Hypothyroidism in his maternal grandmother and mother; Irritable Bowel Syndrome in his sister; Stroke in his paternal grandmother. Allergies: Patient has no known allergies. Physical Exam   Oxygen Saturation Interpretation: Normal.        ED Triage Vitals   BP Temp Temp Source Pulse Resp SpO2 Height Weight   07/10/21 2122 07/10/21 2108 07/10/21 2108 07/10/21 2108 07/10/21 2122 07/10/21 2108 -- --   114/80 98 °F (36.7 °C) Temporal 104 14 98 %           General Appearance/Constitutional:  Alert, development consistent with age, NAD. HEENT:  NC/NT. PERRLA. Airway patent. Neck:  Supple. No lymphadenopathy. Respiratory:  Clear to auscultation and breath sounds equal.  CV:  Regular rate and rhythm. GI:  normal appearing, non-distended with no visible hernias. Bowel sounds: normal bowel sounds. Tenderness: There is mild tenderness present - located diffusely in the lower abdomen. .        Liver: non-tender. Spleen:  non-tender. Back: CVA Tenderness: No CVA tenderness. : Pelvic Exam: (Same sex / third party chaperone present during examination). External Genitalia: General appearance; normal, Hair distribution; normal, Lesions absent. Urethral Meatus: Size normal, Location normal, Lesions absent, Prolapse absent. Vagina: blood noted throughout the vaginal area, mild, clots present that are mild. Cervix: [facility] IUD strings present coming from cervical os. Mild amount of clots being passed from os. Tenaculum site on anterior lip of cervix is healing well and no active bleeding from area.        Uterus:  normal size, contour, position, consistency, mobility, mildly tender with palpation       Adenexa: no tenderness bilaterally. Anus/Perineum:  normal.  Integument:  Normal turgor. Warm, dry, without visible rash, unless noted elsewhere. Lymphatics: No lymphangitis or adenopathy noted. Neurological:  Orientation age-appropriate. Motor functions intact. Lab / Imaging Results   (All laboratory and radiology results have been personally reviewed by myself)  Labs:  No results found for this visit on 07/10/21. Imaging: All Radiology results interpreted by Radiologist unless otherwise noted. No orders to display     ED Course / Medical Decision Making     Medications   ketorolac (TORADOL) injection 30 mg (30 mg Intramuscular Given 7/10/21 2320)   oxyCODONE-acetaminophen (PERCOCET) 5-325 MG per tablet 1 tablet (1 tablet Oral Given 7/11/21 0010)   ondansetron (ZOFRAN-ODT) disintegrating tablet 4 mg (4 mg Oral Given 7/11/21 0010)        Re-examination:  7/10/21       Time: 2340 spoke to patient and her pain was not better therefore pain pill was given. Patient states that her pain has improved and she is ready for discharge    Consults:   None    Procedures:   none    MDM:   Patient is a 43-year-old that presented to the emergency department with pelvic pain and cramping and bleeding after an IUD placement that occurred 2 days ago. Patient had a pelvic examination and IUD strings were present. Mid amount of blood and clots in the vault. Patient was educated highly on IUDs and the fact that IUD cramping can occur for more than a few days and that they should be alternating Tylenol and ibuprofen every 6-8 hours with food consecutively for the next few days to help decrease the pain index. Patient was educated that she still may have cramping with intercourse or exercise which is common with IUD. Patient was told about self string checks and the fact that she needs to follow-up with her OB/GYN to place the IUD within a month to have a string check.   Patient was advised of what to expect with an IUD indefinitely. Patient was advised if they have fever, chills, heavy bleeding, worsening pain that is uncontrolled with Tylenol alternate with ibuprofen and consecutively to return to the emergency department immediately. Patient voiced understanding and agreed with the plan and management. \    Patient was explicitly instructed on specific signs and symptoms on which to return to the emergency room for. Patient was instructed to return to the ER for any new or worsening symptoms. Additional discharge instructions were given verbally. All questions were answered. Patient is comfortable and agreeable with discharge plan. Patient in no acute distress and non-toxic in appearance. Plan of Care/Counseling:  Saba Huggins PA-C reviewed today's visit with the patient in addition to providing specific details for the plan of care and counseling regarding the diagnosis and prognosis. Questions are answered at this time and are agreeable with the plan. Assessment      1. Abdominal cramping    2. IUD (intrauterine device) in place      Plan   Discharged home. Patient condition is stable    New Medications     New Prescriptions    IBUPROFEN (ADVIL;MOTRIN) 600 MG TABLET    Take 1 tablet by mouth every 8 hours as needed for Pain     Electronically signed by Saba Huggins PA-C   DD: 7/10/21  **This report was transcribed using voice recognition software. Every effort was made to ensure accuracy; however, inadvertent computerized transcription errors may be present.   END OF ED PROVIDER NOTE        Saba Huggins PA-C  07/11/21 [facility], [place]  07/11/21 0023

## 2021-07-16 ENCOUNTER — HOSPITAL ENCOUNTER (OUTPATIENT)
Dept: MRI IMAGING | Age: 26
Discharge: HOME | End: 2021-07-18
Payer: COMMERCIAL

## 2021-07-16 DIAGNOSIS — M25.561 CHRONIC PAIN OF BOTH KNEES: ICD-10-CM

## 2021-07-16 DIAGNOSIS — G89.29 CHRONIC PAIN OF BOTH KNEES: ICD-10-CM

## 2021-07-16 DIAGNOSIS — M25.562 CHRONIC PAIN OF BOTH KNEES: ICD-10-CM

## 2021-07-16 PROCEDURE — 73721 MRI JNT OF LWR EXTRE W/O DYE: CPT

## 2021-07-22 ENCOUNTER — OFFICE VISIT (OUTPATIENT)
Dept: CHIROPRACTIC MEDICINE | Age: 26
End: 2021-07-22
Payer: COMMERCIAL

## 2021-07-22 VITALS — TEMPERATURE: 98.1 F | HEART RATE: 86 BPM | OXYGEN SATURATION: 98 % | RESPIRATION RATE: 14 BRPM

## 2021-07-22 DIAGNOSIS — G89.29 CHRONIC BILATERAL THORACIC BACK PAIN: ICD-10-CM

## 2021-07-22 DIAGNOSIS — M99.01 SEGMENTAL AND SOMATIC DYSFUNCTION OF CERVICAL REGION: Primary | ICD-10-CM

## 2021-07-22 DIAGNOSIS — M54.50 CHRONIC BILATERAL LOW BACK PAIN WITHOUT SCIATICA: ICD-10-CM

## 2021-07-22 DIAGNOSIS — M54.6 CHRONIC BILATERAL THORACIC BACK PAIN: ICD-10-CM

## 2021-07-22 DIAGNOSIS — M54.2 CERVICALGIA: ICD-10-CM

## 2021-07-22 DIAGNOSIS — M99.03 SEGMENTAL AND SOMATIC DYSFUNCTION OF LUMBAR REGION: ICD-10-CM

## 2021-07-22 DIAGNOSIS — G89.29 CHRONIC BILATERAL LOW BACK PAIN WITHOUT SCIATICA: ICD-10-CM

## 2021-07-22 DIAGNOSIS — M62.830 MUSCLE SPASM OF BACK: ICD-10-CM

## 2021-07-22 DIAGNOSIS — M99.02 SEGMENTAL AND SOMATIC DYSFUNCTION OF THORACIC REGION: ICD-10-CM

## 2021-07-22 PROCEDURE — 98941 CHIROPRACT MANJ 3-4 REGIONS: CPT | Performed by: CHIROPRACTOR

## 2021-07-22 PROCEDURE — 99999 PR OFFICE/OUTPT VISIT,PROCEDURE ONLY: CPT | Performed by: CHIROPRACTOR

## 2021-07-22 RX ORDER — ATOMOXETINE 60 MG/1
CAPSULE ORAL
COMMUNITY
Start: 2021-06-29 | End: 2021-07-22 | Stop reason: ALTCHOICE

## 2021-07-22 RX ORDER — CHLORHEXIDINE GLUCONATE 0.12 MG/ML
RINSE ORAL
COMMUNITY
Start: 2021-07-09

## 2021-07-22 RX ORDER — TESTOSTERONE CYPIONATE 200 MG/ML
50 INJECTION INTRAMUSCULAR WEEKLY
COMMUNITY
Start: 2021-06-23 | End: 2022-09-26

## 2021-07-22 NOTE — PROGRESS NOTES
21  Dietrich Gowers : 1995 Sex: adult  Age: 26 y.o. Chief Complaint   Patient presents with    Neck Pain       HPI:   Trouble getting in -  Busy with work and things. Feeling 4/10 pain today in neck and back. No new issues with his back . Did have MRIs on both knees-mild chondromalacia patella noted. Reportedly has had some physical therapy in the past, 3 years ago.   See his PCP in a few days for follow-up      Current Outpatient Medications:     lisdexamfetamine (VYVANSE) 30 MG capsule, , Disp: , Rfl:     testosterone cypionate (DEPOTESTOTERONE CYPIONATE) 200 MG/ML injection, Inject 50 mg into the muscle once a week., Disp: , Rfl:     chlorhexidine (PERIDEX) 0.12 % solution, TAKE 15 ML BY MOUTH TWICE DAILY AFTER MEALS *SWISH IN MOUTH FOR 30 SECONDS THEN SPIT OUT*, Disp: , Rfl:     vitamin D (ERGOCALCIFEROL) 1.25 MG (04790 UT) CAPS capsule, TAKE ONE CAPSULE ONE TIME A WEEK, Disp: 4 capsule, Rfl: 3    bisacodyl (DULCOLAX) 5 MG EC tablet, Take 1 tablet by mouth daily as needed for Constipation, Disp: 30 tablet, Rfl: 3    primidone (MYSOLINE) 50 MG tablet, Take 1/2 tablet at night, Disp: 30 tablet, Rfl: 3    fluticasone (FLONASE) 50 MCG/ACT nasal spray, 2 sprays by Each Nostril route daily, Disp: 3 Bottle, Rfl: 1    PARoxetine (PAXIL) 10 MG tablet, Take 1 tablet by mouth daily, Disp: , Rfl:     esomeprazole (NEXIUM) 40 MG delayed release capsule, TAKE 1 CAPSULE BY MOUTH EVERY DAY IN THE MORNING BEFORE BREAKFAST WITH WATER, Disp: , Rfl:     buPROPion (WELLBUTRIN XL) 150 MG extended release tablet, Take 150 mg by mouth every morning, Disp: , Rfl:     ibuprofen (ADVIL;MOTRIN) 600 MG tablet, Take 1 tablet by mouth every 8 hours as needed for Pain, Disp: 30 tablet, Rfl: 0    omeprazole (PRILOSEC) 40 MG delayed release capsule, Take 1 capsule by mouth daily (Patient not taking: Reported on 2021), Disp: 30 capsule, Rfl: 3    Exam:   Vitals:    21 1100   Pulse: 86   Resp: 14 Temp: 98.1 °F (36.7 °C)   SpO2: 98%         There are hypertonic and tender fibers noted today in the lumbar, thoracic and cervical paraspinal muscles. Joint fixation is noted with motion screening at C5-6, T3-5, L5-S1. Saira Lopez was seen today for neck pain. Diagnoses and all orders for this visit:    Segmental and somatic dysfunction of cervical region    Segmental and somatic dysfunction of thoracic region    Segmental and somatic dysfunction of lumbar region    Cervicalgia    Chronic bilateral low back pain without sciatica    Chronic bilateral thoracic back pain    Muscle spasm of back        Treatment Plan: Continue diversified manipulation today to the affected segments in cervical, thoracic and lumbar regions. Tolerated well. I will see him back in approximately 2 weeks for continued care.       Seen By:  Cindy Moffett DC

## 2021-07-26 ENCOUNTER — OFFICE VISIT (OUTPATIENT)
Dept: FAMILY MEDICINE CLINIC | Age: 26
End: 2021-07-26
Payer: COMMERCIAL

## 2021-07-26 VITALS
SYSTOLIC BLOOD PRESSURE: 98 MMHG | TEMPERATURE: 97.2 F | BODY MASS INDEX: 21.87 KG/M2 | OXYGEN SATURATION: 99 % | HEIGHT: 60 IN | WEIGHT: 111.4 LBS | HEART RATE: 97 BPM | DIASTOLIC BLOOD PRESSURE: 58 MMHG | RESPIRATION RATE: 16 BRPM

## 2021-07-26 DIAGNOSIS — M22.40 CHONDROMALACIA OF PATELLOFEMORAL JOINT, UNSPECIFIED LATERALITY: Primary | ICD-10-CM

## 2021-07-26 DIAGNOSIS — J30.9 ALLERGIC RHINITIS, UNSPECIFIED SEASONALITY, UNSPECIFIED TRIGGER: ICD-10-CM

## 2021-07-26 DIAGNOSIS — L30.9 ECZEMA, UNSPECIFIED TYPE: ICD-10-CM

## 2021-07-26 DIAGNOSIS — K58.2 IRRITABLE BOWEL SYNDROME WITH BOTH CONSTIPATION AND DIARRHEA: ICD-10-CM

## 2021-07-26 PROCEDURE — 99214 OFFICE O/P EST MOD 30 MIN: CPT | Performed by: STUDENT IN AN ORGANIZED HEALTH CARE EDUCATION/TRAINING PROGRAM

## 2021-07-26 PROCEDURE — G8420 CALC BMI NORM PARAMETERS: HCPCS | Performed by: STUDENT IN AN ORGANIZED HEALTH CARE EDUCATION/TRAINING PROGRAM

## 2021-07-26 PROCEDURE — G8427 DOCREV CUR MEDS BY ELIG CLIN: HCPCS | Performed by: STUDENT IN AN ORGANIZED HEALTH CARE EDUCATION/TRAINING PROGRAM

## 2021-07-26 PROCEDURE — 1036F TOBACCO NON-USER: CPT | Performed by: STUDENT IN AN ORGANIZED HEALTH CARE EDUCATION/TRAINING PROGRAM

## 2021-07-26 RX ORDER — LUBIPROSTONE 8 UG/1
8 CAPSULE, GELATIN COATED ORAL 2 TIMES DAILY WITH MEALS
Qty: 60 CAPSULE | Refills: 3 | Status: SHIPPED
Start: 2021-07-26 | End: 2021-08-30 | Stop reason: ALTCHOICE

## 2021-07-26 RX ORDER — TRIAMCINOLONE ACETONIDE 0.25 MG/G
CREAM TOPICAL
Qty: 1 TUBE | Refills: 3 | Status: SHIPPED
Start: 2021-07-26 | End: 2021-08-30 | Stop reason: DRUGHIGH

## 2021-07-26 ASSESSMENT — ENCOUNTER SYMPTOMS
EYE PAIN: 0
NAUSEA: 0
SORE THROAT: 1
ABDOMINAL PAIN: 0
SHORTNESS OF BREATH: 0
CONSTIPATION: 1
COUGH: 0
BACK PAIN: 0
DIARRHEA: 1
VOMITING: 0
RHINORRHEA: 0
EYE ITCHING: 1
CHEST TIGHTNESS: 0

## 2021-07-26 NOTE — PROGRESS NOTES
2021    Winnie Good (:  1995) is a 26 y.o. adult, here for evaluation of the following medical concerns:    HPI  Chief Complaint   Patient presents with    Knee Pain     Would like to discuss treatment for knee pain. Had recent MRI's    Constipation     Thinks it might be ibs after researching it. Patient still having constipation. He thinks it is IBS. miralax helped in the past. Gave patient dulcolax at last visit. Will refer to GI. He thinks that it may be IBS because he is switching from the diarrhea and constipation. Patient having normal BM at this time. Has not been taking the medication I prescribed at last visit      Patient still having bilateral knee pain. Discussed MRI results once again with the patient. Treatment is self-care at home. Will refer to PT. Patient did stretches at home over the last 6 months. He did PT in the past in 2019 which did not really help for him. Patient also worried about having darker than normal periods. Discussed it islikely due to not having a period every month. He also switched from the depo to an IUD. It could  Be spotting from switching periods. Discussed that spotting can happen with the IUDs. Discuss with OB/GYN in the future if it continues. He did have a pap smear and STD testing recently. He was also in the ER with work up. Patient concerned about dry skin patch on left wrist. Has history of eczema. Review of Systems   Constitutional: Negative for chills, fatigue and fever. HENT: Positive for sore throat. Negative for congestion, ear pain, postnasal drip and rhinorrhea. Eyes: Positive for itching. Negative for pain. Watery eyes   Respiratory: Negative for cough, chest tightness and shortness of breath. Cardiovascular: Negative for chest pain. Gastrointestinal: Positive for constipation and diarrhea. Negative for abdominal pain, nausea and vomiting.    Genitourinary: Negative for difficulty urinating, dysuria and frequency. Musculoskeletal: Positive for arthralgias. Negative for back pain and myalgias. Skin: Positive for rash. Neurological: Negative for dizziness, light-headedness, numbness and headaches. Prior to Visit Medications    Medication Sig Taking? Authorizing Provider   lubiprostone (AMITIZA) 8 MCG CAPS capsule Take 1 capsule by mouth 2 times daily (with meals) Yes Michelle Noel DO   triamcinolone (KENALOG) 0.025 % cream Apply topically 2 times daily. Yes Michelle Noel DO   lisdexamfetamine (VYVANSE) 30 MG capsule  Yes Historical Provider, MD   testosterone cypionate (DEPOTESTOTERONE CYPIONATE) 200 MG/ML injection Inject 50 mg into the muscle once a week.  Yes Historical Provider, MD   chlorhexidine (PERIDEX) 0.12 % solution TAKE 15 ML BY MOUTH TWICE DAILY AFTER MEALS *SWISH IN MOUTH FOR 30 SECONDS THEN SPIT OUT* Yes Historical Provider, MD   vitamin D (ERGOCALCIFEROL) 1.25 MG (12921 UT) CAPS capsule TAKE ONE CAPSULE ONE TIME A WEEK Yes Michelle Noel DO   bisacodyl (DULCOLAX) 5 MG EC tablet Take 1 tablet by mouth daily as needed for Constipation Yes Michelle Noel DO   primidone (MYSOLINE) 50 MG tablet Take 1/2 tablet at night  Patient taking differently: Take 50 mg by mouth nightly  Yes Michelle Noel DO   fluticasone (FLONASE) 50 MCG/ACT nasal spray 2 sprays by Each Nostril route daily Yes Michelle Noel DO   PARoxetine (PAXIL) 10 MG tablet Take 1 tablet by mouth daily Yes Historical Provider, MD   esomeprazole ([address]) 40 MG delayed release capsule TAKE 1 CAPSULE BY MOUTH EVERY DAY IN THE MORNING BEFORE BREAKFAST WITH WATER Yes Historical Provider, MD   buPROPion (WELLBUTRIN XL) 150 MG extended release tablet Take 150 mg by mouth every morning Yes Historical Provider, MD   ibuprofen (ADVIL;MOTRIN) 600 MG tablet Take 1 tablet by mouth every 8 hours as needed for Pain  Jina Cruz PA-C   omeprazole (PRILOSEC) 40 MG delayed release capsule Take 1 capsule by mouth daily  Patient not taking: Reported on 7/22/2021  Michelle Sadie,         No Known Allergies    Past Medical History:   Diagnosis Date    Acid reflux     Anxiety     Depression     Excessive physiologic tremor 3/29/2021    Gender dysphoria in adult 3/29/2021    Sleep disturbance 3/29/2021    Tremor 3/29/2021    Vitamin D deficiency        History reviewed. No pertinent surgical history. Social History     Socioeconomic History    Marital status: Single     Spouse name: Not on file    Number of children: Not on file    Years of education: Not on file    Highest education level: Not on file   Occupational History    Not on file   Tobacco Use    Smoking status: Never Smoker    Smokeless tobacco: Never Used   Substance and Sexual Activity    Alcohol use: Never    Drug use: Not on file     Comment: last used in September 2020    Sexual activity: Yes   Other Topics Concern    Not on file   Social History Narrative    Not on file     Social Determinants of Health     Financial Resource Strain: Medium Risk    Difficulty of Paying Living Expenses: Somewhat hard   Food Insecurity: No Food Insecurity    Worried About Running Out of Food in the Last Year: Never true    Jackelyn of Food in the Last Year: Never true   Transportation Needs:     Lack of Transportation (Medical):      Lack of Transportation (Non-Medical):    Physical Activity:     Days of Exercise per Week:     Minutes of Exercise per Session:    Stress:     Feeling of Stress :    Social Connections:     Frequency of Communication with Friends and Family:     Frequency of Social Gatherings with Friends and Family:     Attends [employer] Services:     Active Member of Clubs or Organizations:     Attends Club or Organization Meetings:     Marital Status:    Intimate Partner Violence:     Fear of Current or Ex-Partner:     Emotionally Abused:     Physically Abused:     Sexually Abused:         Family History   Problem Relation Age of Onset    Hypothyroidism Mother     High Blood Pressure Father     Depression Maternal Uncle     Asthma Paternal Aunt     High Blood Pressure Maternal Grandmother     Hypothyroidism Maternal Grandmother     Cancer Maternal Grandfather     Stroke Paternal Grandmother     Heart Attack Paternal Grandfather     Irritable Bowel Syndrome Sister            Vitals:    07/26/21 1131   BP: (!) 98/58   Site: Left Upper Arm   Pulse: 97   Resp: 16   Temp: 97.2 °F (36.2 °C)   TempSrc: Temporal   SpO2: 99%   Weight: 111 lb 6.4 oz (50.5 kg)   Height: 5' (1.524 m)     Estimated body mass index is 21.76 kg/m² as calculated from the following:    Height as of this encounter: 5' (1.524 m). Weight as of this encounter: 111 lb 6.4 oz (50.5 kg). Most Recent Labs  CBC  Lab Results   Component Value Date    WBC 8.6 03/02/2021    RBC 4.57 03/02/2021    HGB 13.8 03/02/2021    HCT 43.1 03/02/2021    MCV 94.3 03/02/2021     03/02/2021      CMP  Lab Results   Component Value Date     03/02/2021    K 4.7 03/02/2021     03/02/2021    CO2 24 03/02/2021    ANIONGAP 10 03/02/2021    GLUCOSE 86 03/02/2021    BUN 9 03/02/2021    CREATININE 0.8 03/02/2021    LABGLOM >60 03/02/2021    GFRAA >60 03/02/2021    CALCIUM 9.1 03/02/2021    PROT 7.3 03/02/2021    LABALBU 4.5 03/02/2021    BILITOT 0.3 03/02/2021    ALKPHOS 50 03/02/2021    AST 16 03/02/2021    ALT 20 03/02/2021     TSH  Lab Results   Component Value Date    TSH 1.990 03/02/2021   LIPID  Lab Results   Component Value Date    CHOL 134 03/16/2021    HDL 44 03/16/2021    LDLCALC 82 03/16/2021    TRIG 38 03/16/2021     VITAMIN D  Lab Results   Component Value Date    VITD25 38 02/15/2021        Physical Exam  Vitals and nursing note reviewed. Constitutional:       Appearance: Normal appearance. HENT:      Head: Normocephalic and atraumatic.       Right Ear: Tympanic membrane, ear canal and external ear normal.      Left Ear: Tympanic membrane, ear canal and external ear normal.      Nose: Nose normal.      Mouth/Throat:      Mouth: Mucous membranes are moist.   Eyes:      Extraocular Movements: Extraocular movements intact. Pupils: Pupils are equal, round, and reactive to light. Cardiovascular:      Rate and Rhythm: Normal rate and regular rhythm. Pulses: Normal pulses. Pulmonary:      Breath sounds: Normal breath sounds. Abdominal:      General: Abdomen is flat. Bowel sounds are normal.      Palpations: Abdomen is soft. Musculoskeletal:         General: Normal range of motion. Cervical back: Normal range of motion and neck supple. Skin:     General: Skin is warm and dry. Capillary Refill: Capillary refill takes less than 2 seconds. Findings: Rash present. Comments: Dry skin on left wrist    Neurological:      General: No focal deficit present. Mental Status: He is alert and oriented to person, place, and time. Psychiatric:         Mood and Affect: Mood normal.         Behavior: Behavior normal.         Thought Content: Thought content normal.         ASSESSMENT/PLAN:  Jeremi Gold was seen today for knee pain and constipation. Diagnoses and all orders for this visit:    Chondromalacia of patellofemoral joint, unspecified laterality  -     External Referral To Physical Therapy    Irritable bowel syndrome with both constipation and diarrhea  -     Shilpa Staples MD, Gastroenterology, [facility]  -     lubiprostone (AMITIZA) 8 MCG CAPS capsule; Take 1 capsule by mouth 2 times daily (with meals)    Allergic rhinitis, unspecified seasonality, unspecified trigger  -     [facility]    Eczema, unspecified type  -     triamcinolone (KENALOG) 0.025 % cream; Apply topically 2 times daily. Educational materials and/or home exercises printed for patient's review and were included in patient instructions on his/her After Visit Summary and given to patient at the end of visit.        Counseled regarding above diagnosis,

## 2021-07-29 ENCOUNTER — NURSE ONLY (OUTPATIENT)
Dept: PODIATRY | Age: 26
End: 2021-07-29

## 2021-07-29 DIAGNOSIS — M25.571 ACUTE RIGHT ANKLE PAIN: Primary | ICD-10-CM

## 2021-08-05 ENCOUNTER — OFFICE VISIT (OUTPATIENT)
Dept: CHIROPRACTIC MEDICINE | Age: 26
End: 2021-08-05
Payer: COMMERCIAL

## 2021-08-05 VITALS — TEMPERATURE: 97.9 F | HEART RATE: 73 BPM | RESPIRATION RATE: 14 BRPM | OXYGEN SATURATION: 98 %

## 2021-08-05 DIAGNOSIS — M99.02 SEGMENTAL AND SOMATIC DYSFUNCTION OF THORACIC REGION: ICD-10-CM

## 2021-08-05 DIAGNOSIS — M54.2 CERVICALGIA: ICD-10-CM

## 2021-08-05 DIAGNOSIS — G89.29 CHRONIC BILATERAL THORACIC BACK PAIN: ICD-10-CM

## 2021-08-05 DIAGNOSIS — M99.01 SEGMENTAL AND SOMATIC DYSFUNCTION OF CERVICAL REGION: Primary | ICD-10-CM

## 2021-08-05 DIAGNOSIS — M54.50 CHRONIC BILATERAL LOW BACK PAIN WITHOUT SCIATICA: ICD-10-CM

## 2021-08-05 DIAGNOSIS — G89.29 CHRONIC BILATERAL LOW BACK PAIN WITHOUT SCIATICA: ICD-10-CM

## 2021-08-05 DIAGNOSIS — M62.830 MUSCLE SPASM OF BACK: ICD-10-CM

## 2021-08-05 DIAGNOSIS — M99.03 SEGMENTAL AND SOMATIC DYSFUNCTION OF LUMBAR REGION: ICD-10-CM

## 2021-08-05 DIAGNOSIS — M54.6 CHRONIC BILATERAL THORACIC BACK PAIN: ICD-10-CM

## 2021-08-05 PROCEDURE — 98941 CHIROPRACT MANJ 3-4 REGIONS: CPT | Performed by: CHIROPRACTOR

## 2021-08-05 PROCEDURE — 99999 PR OFFICE/OUTPT VISIT,PROCEDURE ONLY: CPT | Performed by: CHIROPRACTOR

## 2021-08-05 NOTE — PROGRESS NOTES
21  Karla Freeman : 1995 Sex: adult  Age: 26 y.o. Chief Complaint   Patient presents with    Neck Pain       HPI:   Neck and back have been achy and tense since I last saw her. Feels 'partially cracked' like it needs to move more. No new issues however. Current Outpatient Medications:     lubiprostone (AMITIZA) 8 MCG CAPS capsule, Take 1 capsule by mouth 2 times daily (with meals), Disp: 60 capsule, Rfl: 3    triamcinolone (KENALOG) 0.025 % cream, Apply topically 2 times daily. , Disp: 1 Tube, Rfl: 3    lisdexamfetamine (VYVANSE) 30 MG capsule, , Disp: , Rfl:     testosterone cypionate (DEPOTESTOTERONE CYPIONATE) 200 MG/ML injection, Inject 50 mg into the muscle once a week., Disp: , Rfl:     chlorhexidine (PERIDEX) 0.12 % solution, TAKE 15 ML BY MOUTH TWICE DAILY AFTER MEALS *SWISH IN MOUTH FOR 30 SECONDS THEN SPIT OUT*, Disp: , Rfl:     vitamin D (ERGOCALCIFEROL) 1.25 MG (58500 UT) CAPS capsule, TAKE ONE CAPSULE ONE TIME A WEEK, Disp: 4 capsule, Rfl: 3    bisacodyl (DULCOLAX) 5 MG EC tablet, Take 1 tablet by mouth daily as needed for Constipation, Disp: 30 tablet, Rfl: 3    primidone (MYSOLINE) 50 MG tablet, Take 1/2 tablet at night (Patient taking differently: Take 50 mg by mouth nightly ), Disp: 30 tablet, Rfl: 3    fluticasone (FLONASE) 50 MCG/ACT nasal spray, 2 sprays by Each Nostril route daily, Disp: 3 Bottle, Rfl: 1    PARoxetine (PAXIL) 10 MG tablet, Take 1 tablet by mouth daily, Disp: , Rfl:     esomeprazole (NEXIUM) 40 MG delayed release capsule, TAKE 1 CAPSULE BY MOUTH EVERY DAY IN THE MORNING BEFORE BREAKFAST WITH WATER, Disp: , Rfl:     buPROPion (WELLBUTRIN XL) 150 MG extended release tablet, Take 150 mg by mouth every morning, Disp: , Rfl:     Exam:   Vitals:    21 1114   Pulse: 73   Resp: 14   Temp: 97.9 °F (36.6 °C)   SpO2: 98%         There are hypertonic and tender fibers noted today in the cervical, thoracic and lumbar paraspinal muscles.  Joint

## 2021-08-19 ENCOUNTER — NURSE ONLY (OUTPATIENT)
Dept: PODIATRY | Age: 26
End: 2021-08-19
Payer: COMMERCIAL

## 2021-08-19 ENCOUNTER — OFFICE VISIT (OUTPATIENT)
Dept: CHIROPRACTIC MEDICINE | Age: 26
End: 2021-08-19
Payer: COMMERCIAL

## 2021-08-19 VITALS — WEIGHT: 111 LBS | BODY MASS INDEX: 21.68 KG/M2

## 2021-08-19 VITALS — HEART RATE: 76 BPM | OXYGEN SATURATION: 98 % | TEMPERATURE: 98.3 F | RESPIRATION RATE: 14 BRPM

## 2021-08-19 DIAGNOSIS — M54.6 CHRONIC BILATERAL THORACIC BACK PAIN: ICD-10-CM

## 2021-08-19 DIAGNOSIS — M72.2 PLANTAR FASCIAL FIBROMATOSIS: Primary | ICD-10-CM

## 2021-08-19 DIAGNOSIS — M99.01 SEGMENTAL AND SOMATIC DYSFUNCTION OF CERVICAL REGION: Primary | ICD-10-CM

## 2021-08-19 DIAGNOSIS — G89.29 CHRONIC BILATERAL THORACIC BACK PAIN: ICD-10-CM

## 2021-08-19 DIAGNOSIS — M99.02 SEGMENTAL AND SOMATIC DYSFUNCTION OF THORACIC REGION: ICD-10-CM

## 2021-08-19 DIAGNOSIS — M54.2 CERVICALGIA: ICD-10-CM

## 2021-08-19 DIAGNOSIS — M54.50 CHRONIC BILATERAL LOW BACK PAIN WITHOUT SCIATICA: ICD-10-CM

## 2021-08-19 DIAGNOSIS — M25.571 ACUTE RIGHT ANKLE PAIN: ICD-10-CM

## 2021-08-19 DIAGNOSIS — M62.830 MUSCLE SPASM OF BACK: ICD-10-CM

## 2021-08-19 DIAGNOSIS — G89.29 CHRONIC BILATERAL LOW BACK PAIN WITHOUT SCIATICA: ICD-10-CM

## 2021-08-19 DIAGNOSIS — M25.572 ACUTE LEFT ANKLE PAIN: ICD-10-CM

## 2021-08-19 DIAGNOSIS — M99.03 SEGMENTAL AND SOMATIC DYSFUNCTION OF LUMBAR REGION: ICD-10-CM

## 2021-08-19 PROCEDURE — 98941 CHIROPRACT MANJ 3-4 REGIONS: CPT | Performed by: CHIROPRACTOR

## 2021-08-19 PROCEDURE — L3020 FOOT LONGITUD/METATARSAL SUP: HCPCS | Performed by: PODIATRIST

## 2021-08-19 NOTE — PROGRESS NOTES
The patient presents today per verbal consent of the treating physician for custom molded orthotics. The patient was instructed on how to wear and break in the custom orthotics. The patient will reappoint in 2-3 weeks with the treating physician to make adjustments as needed to the custom orthotics. 15 was spent educating and fitting the patient. The patient demonstrated understanding and comfort with the orthotic. Orthotics are medically necessary to help reduce pain, promote, and expedite healing, and relieve symptoms.

## 2021-08-19 NOTE — PROGRESS NOTES
21  Henrry Green : 1995 Sex: adult  Age: 26 y.o. Chief Complaint   Patient presents with    Neck Pain    Back Pain       HPI:   States he was doing well after her last visit. But he has been going to physical therapy for his knees of late and that makes his leg sore. Also has been on his feet a lot at work. Little more stress as well. Thinks he may have slept wrong last night and today he has increased tightness and stiffness in the neck and upper back. No new issues otherwise. Current Outpatient Medications:     lubiprostone (AMITIZA) 8 MCG CAPS capsule, Take 1 capsule by mouth 2 times daily (with meals), Disp: 60 capsule, Rfl: 3    triamcinolone (KENALOG) 0.025 % cream, Apply topically 2 times daily. , Disp: 1 Tube, Rfl: 3    lisdexamfetamine (VYVANSE) 30 MG capsule, 40 mg. , Disp: , Rfl:     testosterone cypionate (DEPOTESTOTERONE CYPIONATE) 200 MG/ML injection, Inject 50 mg into the muscle once a week., Disp: , Rfl:     chlorhexidine (PERIDEX) 0.12 % solution, TAKE 15 ML BY MOUTH TWICE DAILY AFTER MEALS *SWISH IN MOUTH FOR 30 SECONDS THEN SPIT OUT*, Disp: , Rfl:     vitamin D (ERGOCALCIFEROL) 1.25 MG (62877 UT) CAPS capsule, TAKE ONE CAPSULE ONE TIME A WEEK, Disp: 4 capsule, Rfl: 3    bisacodyl (DULCOLAX) 5 MG EC tablet, Take 1 tablet by mouth daily as needed for Constipation, Disp: 30 tablet, Rfl: 3    primidone (MYSOLINE) 50 MG tablet, Take 1/2 tablet at night (Patient taking differently: Take 50 mg by mouth nightly ), Disp: 30 tablet, Rfl: 3    fluticasone (FLONASE) 50 MCG/ACT nasal spray, 2 sprays by Each Nostril route daily, Disp: 3 Bottle, Rfl: 1    PARoxetine (PAXIL) 10 MG tablet, Take 1 tablet by mouth daily, Disp: , Rfl:     esomeprazole (NEXIUM) 40 MG delayed release capsule, TAKE 1 CAPSULE BY MOUTH EVERY DAY IN THE MORNING BEFORE BREAKFAST WITH WATER, Disp: , Rfl:     buPROPion (WELLBUTRIN XL) 150 MG extended release tablet, Take 150 mg by mouth every morning, Disp: , Rfl:     Exam:   Vitals:    08/19/21 1314   Pulse: 76   Resp: 14   Temp: 98.3 °F (36.8 °C)   SpO2: 98%       Appears well. No distress. Trigger points bilateral trapezius. No midline pain. There are hypertonic and tender fibers noted today in the cervical, thoracic and upper lumbar paraspinal muscles. Joint fixation is noted with motion screening at T12-L2, T4-7, C5-6. moy [place] was seen today for neck pain and back pain. Diagnoses and all orders for this visit:    Segmental and somatic dysfunction of cervical region    Segmental and somatic dysfunction of thoracic region    Segmental and somatic dysfunction of lumbar region    Chronic bilateral thoracic back pain    Chronic bilateral low back pain without sciatica    Cervicalgia    Muscle spasm of back        Treatment Plan: Continue diversified manipulation to the affected segments in the cervical, thoracic, lumbar regions. Tolerated well. Follow-up in 2 weeks or as needed for further care.       Seen By:  Leticia Mcintyre DC

## 2021-08-30 ENCOUNTER — TELEPHONE (OUTPATIENT)
Dept: FAMILY MEDICINE CLINIC | Age: 26
End: 2021-08-30

## 2021-08-30 ENCOUNTER — OFFICE VISIT (OUTPATIENT)
Dept: FAMILY MEDICINE CLINIC | Age: 26
End: 2021-08-30
Payer: COMMERCIAL

## 2021-08-30 VITALS
HEIGHT: 60 IN | RESPIRATION RATE: 16 BRPM | WEIGHT: 113 LBS | HEART RATE: 72 BPM | TEMPERATURE: 98.6 F | SYSTOLIC BLOOD PRESSURE: 106 MMHG | BODY MASS INDEX: 22.19 KG/M2 | DIASTOLIC BLOOD PRESSURE: 64 MMHG | OXYGEN SATURATION: 98 %

## 2021-08-30 DIAGNOSIS — L30.9 ECZEMA, UNSPECIFIED TYPE: Primary | ICD-10-CM

## 2021-08-30 PROCEDURE — G8420 CALC BMI NORM PARAMETERS: HCPCS | Performed by: STUDENT IN AN ORGANIZED HEALTH CARE EDUCATION/TRAINING PROGRAM

## 2021-08-30 PROCEDURE — G8427 DOCREV CUR MEDS BY ELIG CLIN: HCPCS | Performed by: STUDENT IN AN ORGANIZED HEALTH CARE EDUCATION/TRAINING PROGRAM

## 2021-08-30 PROCEDURE — 1036F TOBACCO NON-USER: CPT | Performed by: STUDENT IN AN ORGANIZED HEALTH CARE EDUCATION/TRAINING PROGRAM

## 2021-08-30 PROCEDURE — 99213 OFFICE O/P EST LOW 20 MIN: CPT | Performed by: STUDENT IN AN ORGANIZED HEALTH CARE EDUCATION/TRAINING PROGRAM

## 2021-08-30 RX ORDER — TRIAMCINOLONE ACETONIDE 1 MG/G
CREAM TOPICAL
Qty: 80 G | Refills: 3 | Status: SHIPPED | OUTPATIENT
Start: 2021-08-30

## 2021-08-30 RX ORDER — LISDEXAMFETAMINE DIMESYLATE 60 MG/1
1 CAPSULE ORAL DAILY
COMMUNITY
Start: 2021-08-19 | End: 2021-12-14 | Stop reason: ALTCHOICE

## 2021-08-30 ASSESSMENT — ENCOUNTER SYMPTOMS
ABDOMINAL PAIN: 0
BACK PAIN: 1
SHORTNESS OF BREATH: 0
COUGH: 0
CHEST TIGHTNESS: 0
EYE PAIN: 0
DIARRHEA: 0
RHINORRHEA: 0
VOMITING: 0
CONSTIPATION: 0
NAUSEA: 0

## 2021-08-30 NOTE — PROGRESS NOTES
2021    Carolina Giraldo (:  1995) is a 26 y.o. adult, here for evaluation of the following medical concerns:    HPI  Chief Complaint   Patient presents with    Eczema     scalp itching, will scratch and bleed, going on for years     Eczema  Patient complains of a rash. Onset of symptoms was since adolescence and have been gradually worsening since that time. Risk factors include: n/a. Treatment modalities that have been used in the past include: none, he has never had any treatment. he states that every time he scratches he gets skin under the nails and sometimes he bleeds after scratching. He has it on his left hand. Patient states he is always fatigued. His therapist said to bring it up to me. We discussed at length that depression anxiety and ADHD can cause this and we need to get it all under control. He understands. Patient always has back and neck and knee pain, nothing has changed. Review of Systems   Constitutional: Positive for fatigue. Negative for chills and fever. HENT: Negative for congestion, ear pain, postnasal drip and rhinorrhea. Eyes: Negative for pain. Respiratory: Negative for cough, chest tightness and shortness of breath. Cardiovascular: Negative for chest pain. Gastrointestinal: Negative for abdominal pain, constipation, diarrhea, nausea and vomiting. Genitourinary: Negative for difficulty urinating, dysuria and frequency. Musculoskeletal: Positive for arthralgias, back pain and neck pain. Negative for myalgias. Skin: Positive for rash. Neurological: Negative for dizziness, light-headedness, numbness and headaches. Prior to Visit Medications    Medication Sig Taking? Authorizing Provider   VYVANSE 40 MG CAPS Take 1 tablet by mouth daily. Yes Historical Provider, MD   triamcinolone (KENALOG) 0.1 % cream Apply topically 2 times daily.  Yes Michelle Noel DO   testosterone cypionate (DEPOTESTOTERONE CYPIONATE) 200 MG/ML injection Inject 50 mg into the muscle once a week. Yes Historical Provider, MD   chlorhexidine (PERIDEX) 0.12 % solution TAKE 15 ML BY MOUTH TWICE DAILY AFTER MEALS *SWISH IN MOUTH FOR 30 SECONDS THEN SPIT OUT* Yes Historical Provider, MD   vitamin D (ERGOCALCIFEROL) 1.25 MG (29178 UT) CAPS capsule TAKE ONE CAPSULE ONE TIME A WEEK Yes Michelle Noel DO   primidone (MYSOLINE) 50 MG tablet Take 1/2 tablet at night  Patient taking differently: Take 50 mg by mouth nightly  Yes Michelle Noel DO   fluticasone (FLONASE) 50 MCG/ACT nasal spray 2 sprays by Each Nostril route daily Yes Michelle Noel DO   PARoxetine (PAXIL) 10 MG tablet Take 1 tablet by mouth daily Yes Historical Provider, MD   esomeprazole ([address]) 40 MG delayed release capsule TAKE 1 CAPSULE BY MOUTH EVERY DAY IN THE MORNING BEFORE BREAKFAST WITH WATER Yes Historical Provider, MD   buPROPion (WELLBUTRIN XL) 150 MG extended release tablet Take 150 mg by mouth every morning Yes Historical Provider, MD        No Known Allergies    Past Medical History:   Diagnosis Date    Acid reflux     Anxiety     Depression     Excessive physiologic tremor 3/29/2021    Gender dysphoria in adult 3/29/2021    Sleep disturbance 3/29/2021    Tremor 3/29/2021    Vitamin D deficiency        History reviewed. No pertinent surgical history.     Social History     Socioeconomic History    Marital status: Single     Spouse name: Not on file    Number of children: Not on file    Years of education: Not on file    Highest education level: Not on file   Occupational History    Not on file   Tobacco Use    Smoking status: Never Smoker    Smokeless tobacco: Never Used   Substance and Sexual Activity    Alcohol use: Never    Drug use: Not on file     Comment: last used in September 2020    Sexual activity: Yes   Other Topics Concern    Not on file   Social History Narrative    Not on file     Social Determinants of Health     Financial Resource Strain: Medium Risk    Difficulty of Paying Living Expenses: Somewhat hard   Food Insecurity: No Food Insecurity    Worried About Running Out of Food in the Last Year: Never true    Ran Out of Food in the Last Year: Never true   Transportation Needs:     Lack of Transportation (Medical):  Lack of Transportation (Non-Medical):    Physical Activity:     Days of Exercise per Week:     Minutes of Exercise per Session:    Stress:     Feeling of Stress :    Social Connections:     Frequency of Communication with Friends and Family:     Frequency of Social Gatherings with Friends and Family:     Attends Islam Services:     Active Member of Clubs or Organizations:     Attends Club or Organization Meetings:     Marital Status:    Intimate Partner Violence:     Fear of Current or Ex-Partner:     Emotionally Abused:     Physically Abused:     Sexually Abused:         Family History   Problem Relation Age of Onset    Hypothyroidism Mother     High Blood Pressure Father     Depression Maternal Uncle     Asthma Paternal Aunt     High Blood Pressure Maternal Grandmother     Hypothyroidism Maternal Grandmother     Cancer Maternal Grandfather     Stroke Paternal Grandmother     Heart Attack Paternal Grandfather     Irritable Bowel Syndrome Sister            Vitals:    08/30/21 1025   BP: 106/64   Pulse: 72   Resp: 16   Temp: 98.6 °F (37 °C)   TempSrc: Oral   SpO2: 98%   Weight: 113 lb (51.3 kg)   Height: 5' (1.524 m)     Estimated body mass index is 22.07 kg/m² as calculated from the following:    Height as of this encounter: 5' (1.524 m). Weight as of this encounter: 113 lb (51.3 kg).     Most Recent Labs  CBC  Lab Results   Component Value Date    WBC 8.6 03/02/2021    RBC 4.57 03/02/2021    HGB 13.8 03/02/2021    HCT 43.1 03/02/2021    MCV 94.3 03/02/2021     03/02/2021      CMP  Lab Results   Component Value Date     03/02/2021    K 4.7 03/02/2021     03/02/2021    CO2 24 03/02/2021    ANIONGAP 10 03/02/2021 GLUCOSE 86 03/02/2021    BUN 9 03/02/2021    CREATININE 0.8 03/02/2021    LABGLOM >60 03/02/2021    GFRAA >60 03/02/2021    CALCIUM 9.1 03/02/2021    PROT 7.3 03/02/2021    LABALBU 4.5 03/02/2021    BILITOT 0.3 03/02/2021    ALKPHOS 50 03/02/2021    AST 16 03/02/2021    ALT 20 03/02/2021     A1C  No results found for: LABA1C  TSH  Lab Results   Component Value Date    TSH 1.990 03/02/2021     FREET4  No results found for: O2RQSNO  LIPID  Lab Results   Component Value Date    CHOL 134 03/16/2021    HDL 44 03/16/2021    LDLCALC 82 03/16/2021    TRIG 38 03/16/2021     VITAMIN D  Lab Results   Component Value Date    VITD25 38 02/15/2021     MAGNESIUM  No results found for: MG   PHOS  No results found for: PHOS   RONNY   No results found for: RONNY  RHEUMATOID FACTOR  No results found for: RF   HEPATITIS C  No results found for: HCVABI  HIV  No results found for: VIA9FBQ, HIV1QT  UA  No results found for: Wilfredo Saner, GLUCOSEU, BILIRUBINUR, KETUA, SPECGRAV, BLOODU, PHUR, PROTEINU, UROBILINOGEN, NITRU, LEUKOCYTESUR  Urine Micro/Albumin Ratio  No results found for: [facility]     Physical Exam  Vitals and nursing note reviewed. Constitutional:       Appearance: Normal appearance. HENT:      Head: Normocephalic and atraumatic. Right Ear: Tympanic membrane, ear canal and external ear normal.      Left Ear: Tympanic membrane, ear canal and external ear normal.      Nose: Nose normal.      Mouth/Throat:      Mouth: Mucous membranes are moist.   Eyes:      Extraocular Movements: Extraocular movements intact. Pupils: Pupils are equal, round, and reactive to light. Cardiovascular:      Rate and Rhythm: Normal rate and regular rhythm. Pulses: Normal pulses. Pulmonary:      Breath sounds: Normal breath sounds. Musculoskeletal:         General: Normal range of motion. Cervical back: Normal range of motion and neck supple. Skin:     General: Skin is warm and dry.       Capillary Refill: Capillary refill note.    --Krunal Enriquez DO on 8/30/2021 at 12:59 PM

## 2021-08-30 NOTE — TELEPHONE ENCOUNTER
----- Message from Alice April, [address] [place] sent at 8/30/2021  1:02 PM EDT -----  Subject: Message to Provider    QUESTIONS  Information for Provider? Pt called and stated that the medication   Triamcinolone that was sent in to her pharmacy stated that her insurance   does not cover the RX. Pt would like to know if there is a different   alternative to send in for her that will be covered by her insurance? Please call pt back and inform her if there was a different RX sent.   ---------------------------------------------------------------------------  --------------  [address]  What is the best way for the office to contact you? OK to leave message on   voicemail  Preferred Call Back Phone Number? [phone]  ---------------------------------------------------------------------------  --------------  SCRIPT ANSWERS  Relationship to Patient?  Self

## 2021-09-02 ENCOUNTER — OFFICE VISIT (OUTPATIENT)
Dept: CHIROPRACTIC MEDICINE | Age: 26
End: 2021-09-02
Payer: COMMERCIAL

## 2021-09-02 VITALS — TEMPERATURE: 98.2 F | RESPIRATION RATE: 14 BRPM | HEART RATE: 78 BPM | OXYGEN SATURATION: 96 %

## 2021-09-02 DIAGNOSIS — M62.830 MUSCLE SPASM OF BACK: ICD-10-CM

## 2021-09-02 DIAGNOSIS — M99.03 SEGMENTAL AND SOMATIC DYSFUNCTION OF LUMBAR REGION: ICD-10-CM

## 2021-09-02 DIAGNOSIS — M99.01 SEGMENTAL AND SOMATIC DYSFUNCTION OF CERVICAL REGION: Primary | ICD-10-CM

## 2021-09-02 DIAGNOSIS — G89.29 CHRONIC BILATERAL THORACIC BACK PAIN: ICD-10-CM

## 2021-09-02 DIAGNOSIS — M54.6 CHRONIC BILATERAL THORACIC BACK PAIN: ICD-10-CM

## 2021-09-02 DIAGNOSIS — M99.02 SEGMENTAL AND SOMATIC DYSFUNCTION OF THORACIC REGION: ICD-10-CM

## 2021-09-02 DIAGNOSIS — G89.29 CHRONIC BILATERAL LOW BACK PAIN WITHOUT SCIATICA: ICD-10-CM

## 2021-09-02 DIAGNOSIS — M54.50 CHRONIC BILATERAL LOW BACK PAIN WITHOUT SCIATICA: ICD-10-CM

## 2021-09-02 DIAGNOSIS — M54.2 CERVICALGIA: ICD-10-CM

## 2021-09-02 PROCEDURE — 99999 PR OFFICE/OUTPT VISIT,PROCEDURE ONLY: CPT | Performed by: CHIROPRACTOR

## 2021-09-02 PROCEDURE — 98941 CHIROPRACT MANJ 3-4 REGIONS: CPT | Performed by: CHIROPRACTOR

## 2021-09-02 NOTE — PROGRESS NOTES
21  Karla Freeman : 1995 Sex: adult  Age: 26 y.o. Chief Complaint   Patient presents with    Neck Pain       HPI:   Pain is has improved. On average, pain is perceived as mild (1-3  pain scale). Change in quality of symptoms: no.  He denies any other symptoms. Doing PT for knees. Feels PT and the orthotics have helped with the knee issues. Current Outpatient Medications:     VYVANSE 40 MG CAPS, Take 1 tablet by mouth daily. , Disp: , Rfl:     triamcinolone (KENALOG) 0.1 % cream, Apply topically 2 times daily. , Disp: 80 g, Rfl: 3    testosterone cypionate (DEPOTESTOTERONE CYPIONATE) 200 MG/ML injection, Inject 50 mg into the muscle once a week., Disp: , Rfl:     chlorhexidine (PERIDEX) 0.12 % solution, TAKE 15 ML BY MOUTH TWICE DAILY AFTER MEALS *SWISH IN MOUTH FOR 30 SECONDS THEN SPIT OUT*, Disp: , Rfl:     vitamin D (ERGOCALCIFEROL) 1.25 MG (78382 UT) CAPS capsule, TAKE ONE CAPSULE ONE TIME A WEEK, Disp: 4 capsule, Rfl: 3    primidone (MYSOLINE) 50 MG tablet, Take 1/2 tablet at night (Patient taking differently: Take 50 mg by mouth nightly ), Disp: 30 tablet, Rfl: 3    fluticasone (FLONASE) 50 MCG/ACT nasal spray, 2 sprays by Each Nostril route daily, Disp: 3 Bottle, Rfl: 1    PARoxetine (PAXIL) 10 MG tablet, Take 1 tablet by mouth daily, Disp: , Rfl:     esomeprazole (NEXIUM) 40 MG delayed release capsule, TAKE 1 CAPSULE BY MOUTH EVERY DAY IN THE MORNING BEFORE BREAKFAST WITH WATER, Disp: , Rfl:     buPROPion (WELLBUTRIN XL) 150 MG extended release tablet, Take 150 mg by mouth every morning, Disp: , Rfl:     Exam:   Vitals:    21 1326   Pulse: 78   Resp: 14   Temp: 98.2 °F (36.8 °C)   SpO2: 96%         There are hypertonic and tender fibers noted today in the cervical, thoracic and lumbar paraspinal muscles. Joint fixation is noted with motion screening at C4-6, C7-T1, T3-5, L5-S1 and bilateral SI joints. Ori Stephens was seen today for neck pain.     Diagnoses and all orders for this visit:    Segmental and somatic dysfunction of cervical region    Segmental and somatic dysfunction of thoracic region    Segmental and somatic dysfunction of lumbar region    Chronic bilateral thoracic back pain    Chronic bilateral low back pain without sciatica    Cervicalgia    Muscle spasm of back        Treatment Plan:  Diversified manipulation to the listed cervical, thoracic, lumbar, pelvic segments. Tolerated well.   I will see him back in 2 weeks or as needed for further care      Seen By:  Tommie Siegel

## 2021-09-16 ENCOUNTER — OFFICE VISIT (OUTPATIENT)
Dept: CHIROPRACTIC MEDICINE | Age: 26
End: 2021-09-16
Payer: COMMERCIAL

## 2021-09-16 VITALS — OXYGEN SATURATION: 98 % | HEART RATE: 68 BPM | RESPIRATION RATE: 14 BRPM | TEMPERATURE: 97.1 F

## 2021-09-16 DIAGNOSIS — M99.05 SEGMENTAL AND SOMATIC DYSFUNCTION OF PELVIC REGION: ICD-10-CM

## 2021-09-16 DIAGNOSIS — M53.3 SACROCOCCYGEAL DISORDERS, NOT ELSEWHERE CLASSIFIED: ICD-10-CM

## 2021-09-16 DIAGNOSIS — M54.6 CHRONIC BILATERAL THORACIC BACK PAIN: ICD-10-CM

## 2021-09-16 DIAGNOSIS — G89.29 CHRONIC BILATERAL LOW BACK PAIN WITHOUT SCIATICA: ICD-10-CM

## 2021-09-16 DIAGNOSIS — M54.50 CHRONIC BILATERAL LOW BACK PAIN WITHOUT SCIATICA: ICD-10-CM

## 2021-09-16 DIAGNOSIS — G89.29 CHRONIC BILATERAL THORACIC BACK PAIN: ICD-10-CM

## 2021-09-16 DIAGNOSIS — M99.03 SEGMENTAL AND SOMATIC DYSFUNCTION OF LUMBAR REGION: Primary | ICD-10-CM

## 2021-09-16 DIAGNOSIS — M99.02 SEGMENTAL AND SOMATIC DYSFUNCTION OF THORACIC REGION: ICD-10-CM

## 2021-09-16 PROCEDURE — 98941 CHIROPRACT MANJ 3-4 REGIONS: CPT | Performed by: CHIROPRACTOR

## 2021-09-16 NOTE — PROGRESS NOTES
21  Matthew Khalil : 1995 Sex: adult  Age: 26 y.o. Chief Complaint   Patient presents with    Neck Pain       HPI:   Having some lower back pain today identified in the lower lumbar region without radiation of symptoms to extremities. He continues with physical therapy for the knees. No new accidents or injuries. Current Outpatient Medications:     VYVANSE 60 MG CAPS, Take 1 tablet by mouth daily. , Disp: , Rfl:     triamcinolone (KENALOG) 0.1 % cream, Apply topically 2 times daily. , Disp: 80 g, Rfl: 3    testosterone cypionate (DEPOTESTOTERONE CYPIONATE) 200 MG/ML injection, Inject 50 mg into the muscle once a week., Disp: , Rfl:     chlorhexidine (PERIDEX) 0.12 % solution, TAKE 15 ML BY MOUTH TWICE DAILY AFTER MEALS *SWISH IN MOUTH FOR 30 SECONDS THEN SPIT OUT*, Disp: , Rfl:     vitamin D (ERGOCALCIFEROL) 1.25 MG (73454 UT) CAPS capsule, TAKE ONE CAPSULE ONE TIME A WEEK, Disp: 4 capsule, Rfl: 3    primidone (MYSOLINE) 50 MG tablet, Take 1/2 tablet at night (Patient taking differently: Take 50 mg by mouth nightly ), Disp: 30 tablet, Rfl: 3    fluticasone (FLONASE) 50 MCG/ACT nasal spray, 2 sprays by Each Nostril route daily, Disp: 3 Bottle, Rfl: 1    PARoxetine (PAXIL) 10 MG tablet, Take 1 tablet by mouth daily, Disp: , Rfl:     esomeprazole (NEXIUM) 40 MG delayed release capsule, TAKE 1 CAPSULE BY MOUTH EVERY DAY IN THE MORNING BEFORE BREAKFAST WITH WATER, Disp: , Rfl:     buPROPion (WELLBUTRIN XL) 150 MG extended release tablet, Take 150 mg by mouth every morning, Disp: , Rfl:     Exam:   Vitals:    21 1303   Pulse: 68   Resp: 14   Temp: 97.1 °F (36.2 °C)   SpO2: 98%         There are hypertonic and tender fibers noted today in the thoracic and lumbar paraspinal muscles. Joint fixation is noted with motion screening at T5-7, T12-L1, L4-5 and bilateral SI joints. Cristino Muse was seen today for neck pain.     Diagnoses and all orders for this visit:    Segmental and somatic dysfunction of lumbar region    Chronic bilateral low back pain without sciatica    Segmental and somatic dysfunction of thoracic region    Chronic bilateral thoracic back pain    Segmental and somatic dysfunction of pelvic region    Sacrococcygeal disorders, not elsewhere classified        Treatment Plan: Modified treatment today-Conner flexion distraction manipulation at L4, protocol 1. Mechanically assisted manipulation of the SI joints and listed thoracic segments. Tolerated well.   Follow-up in 2 weeks or as needed for continued care      Seen By:  Merlene De Leon

## 2021-09-20 DIAGNOSIS — R25.1 EXCESSIVE PHYSIOLOGIC TREMOR: ICD-10-CM

## 2021-09-20 RX ORDER — PRIMIDONE 50 MG/1
50 TABLET ORAL NIGHTLY
Qty: 30 TABLET | Refills: 3 | Status: SHIPPED
Start: 2021-09-20 | End: 2021-11-23 | Stop reason: SDUPTHER

## 2021-09-30 ENCOUNTER — OFFICE VISIT (OUTPATIENT)
Dept: CHIROPRACTIC MEDICINE | Age: 26
End: 2021-09-30
Payer: COMMERCIAL

## 2021-09-30 VITALS — RESPIRATION RATE: 14 BRPM | TEMPERATURE: 98.2 F | HEART RATE: 77 BPM | OXYGEN SATURATION: 98 %

## 2021-09-30 DIAGNOSIS — M99.03 SEGMENTAL AND SOMATIC DYSFUNCTION OF LUMBAR REGION: Primary | ICD-10-CM

## 2021-09-30 DIAGNOSIS — M53.3 SACROCOCCYGEAL DISORDERS, NOT ELSEWHERE CLASSIFIED: ICD-10-CM

## 2021-09-30 DIAGNOSIS — M99.02 SEGMENTAL AND SOMATIC DYSFUNCTION OF THORACIC REGION: ICD-10-CM

## 2021-09-30 DIAGNOSIS — M54.2 CERVICALGIA: ICD-10-CM

## 2021-09-30 DIAGNOSIS — M99.01 SEGMENTAL AND SOMATIC DYSFUNCTION OF CERVICAL REGION: ICD-10-CM

## 2021-09-30 DIAGNOSIS — M99.05 SEGMENTAL AND SOMATIC DYSFUNCTION OF PELVIC REGION: ICD-10-CM

## 2021-09-30 DIAGNOSIS — M54.6 CHRONIC BILATERAL THORACIC BACK PAIN: ICD-10-CM

## 2021-09-30 DIAGNOSIS — G89.29 CHRONIC BILATERAL LOW BACK PAIN WITHOUT SCIATICA: ICD-10-CM

## 2021-09-30 DIAGNOSIS — G89.29 CHRONIC BILATERAL THORACIC BACK PAIN: ICD-10-CM

## 2021-09-30 DIAGNOSIS — M54.50 CHRONIC BILATERAL LOW BACK PAIN WITHOUT SCIATICA: ICD-10-CM

## 2021-09-30 PROCEDURE — 99999 PR OFFICE/OUTPT VISIT,PROCEDURE ONLY: CPT | Performed by: CHIROPRACTOR

## 2021-09-30 PROCEDURE — 98941 CHIROPRACT MANJ 3-4 REGIONS: CPT | Performed by: CHIROPRACTOR

## 2021-09-30 NOTE — PROGRESS NOTES
21  Munira Ranks : 1995 Sex: adult  Age: 26 y.o. Chief Complaint   Patient presents with    Back Pain       HPI:   Stiffness in mid and upper back mainly today. PT worked on it a bit too. LBP has been better comparatively, hips are tense         Current Outpatient Medications:     primidone (MYSOLINE) 50 MG tablet, Take 1 tablet by mouth nightly, Disp: 30 tablet, Rfl: 3    VYVANSE 60 MG CAPS, Take 1 tablet by mouth daily. , Disp: , Rfl:     triamcinolone (KENALOG) 0.1 % cream, Apply topically 2 times daily. , Disp: 80 g, Rfl: 3    testosterone cypionate (DEPOTESTOTERONE CYPIONATE) 200 MG/ML injection, Inject 50 mg into the muscle once a week., Disp: , Rfl:     chlorhexidine (PERIDEX) 0.12 % solution, TAKE 15 ML BY MOUTH TWICE DAILY AFTER MEALS *SWISH IN MOUTH FOR 30 SECONDS THEN SPIT OUT*, Disp: , Rfl:     vitamin D (ERGOCALCIFEROL) 1.25 MG (52070 UT) CAPS capsule, TAKE ONE CAPSULE ONE TIME A WEEK, Disp: 4 capsule, Rfl: 3    fluticasone (FLONASE) 50 MCG/ACT nasal spray, 2 sprays by Each Nostril route daily, Disp: 3 Bottle, Rfl: 1    PARoxetine (PAXIL) 10 MG tablet, Take 1 tablet by mouth daily, Disp: , Rfl:     esomeprazole (NEXIUM) 40 MG delayed release capsule, TAKE 1 CAPSULE BY MOUTH EVERY DAY IN THE MORNING BEFORE BREAKFAST WITH WATER, Disp: , Rfl:     buPROPion (WELLBUTRIN XL) 150 MG extended release tablet, Take 150 mg by mouth every morning, Disp: , Rfl:     Exam:   Vitals:    21 1419   Pulse: 77   Resp: 14   Temp: 98.2 °F (36.8 °C)   SpO2: 98%         There are hypertonic and tender fibers noted today in the lumbar, thoracic and cervical paraspinal muscles. Trigger points in the bilateral trapezius. No midline pain. Joint fixation is noted with motion screening at bilateral SI joints, L4-5, T3-6, C6-T1. Jeremi Gold was seen today for back pain.     Diagnoses and all orders for this visit:    Segmental and somatic dysfunction of lumbar region    Segmental and somatic dysfunction of thoracic region    Segmental and somatic dysfunction of pelvic region    Segmental and somatic dysfunction of cervical region    Chronic bilateral low back pain without sciatica    Chronic bilateral thoracic back pain    Sacrococcygeal disorders, not elsewhere classified    Cervicalgia        Treatment Plan: Diversified manipulation listed cervical and thoracic segments. Conner flexion distraction manipulation at L4, protocol 1. Mechanically assisted manipulation of the SI joints. He tolerated this well. I will see him back in 1 week or as needed for care.       Seen By:  Carlos Durant DC

## 2021-10-06 ENCOUNTER — OFFICE VISIT (OUTPATIENT)
Dept: CHIROPRACTIC MEDICINE | Age: 26
End: 2021-10-06
Payer: COMMERCIAL

## 2021-10-06 VITALS — HEIGHT: 60 IN | TEMPERATURE: 98.2 F | BODY MASS INDEX: 22.19 KG/M2 | WEIGHT: 113 LBS | HEART RATE: 74 BPM

## 2021-10-06 DIAGNOSIS — M54.2 CERVICALGIA: ICD-10-CM

## 2021-10-06 DIAGNOSIS — M54.50 CHRONIC BILATERAL LOW BACK PAIN WITHOUT SCIATICA: ICD-10-CM

## 2021-10-06 DIAGNOSIS — M54.6 CHRONIC BILATERAL THORACIC BACK PAIN: ICD-10-CM

## 2021-10-06 DIAGNOSIS — M99.01 SEGMENTAL AND SOMATIC DYSFUNCTION OF CERVICAL REGION: ICD-10-CM

## 2021-10-06 DIAGNOSIS — G89.29 CHRONIC BILATERAL LOW BACK PAIN WITHOUT SCIATICA: ICD-10-CM

## 2021-10-06 DIAGNOSIS — M99.03 SEGMENTAL AND SOMATIC DYSFUNCTION OF LUMBAR REGION: Primary | ICD-10-CM

## 2021-10-06 DIAGNOSIS — M99.05 SEGMENTAL AND SOMATIC DYSFUNCTION OF PELVIC REGION: ICD-10-CM

## 2021-10-06 DIAGNOSIS — M53.3 SACROCOCCYGEAL DISORDERS, NOT ELSEWHERE CLASSIFIED: ICD-10-CM

## 2021-10-06 DIAGNOSIS — M99.02 SEGMENTAL AND SOMATIC DYSFUNCTION OF THORACIC REGION: ICD-10-CM

## 2021-10-06 DIAGNOSIS — G89.29 CHRONIC BILATERAL THORACIC BACK PAIN: ICD-10-CM

## 2021-10-06 PROCEDURE — 99999 PR OFFICE/OUTPT VISIT,PROCEDURE ONLY: CPT | Performed by: CHIROPRACTOR

## 2021-10-06 PROCEDURE — 98941 CHIROPRACT MANJ 3-4 REGIONS: CPT | Performed by: CHIROPRACTOR

## 2021-10-06 NOTE — PROGRESS NOTES
Patient is here for follow up back pain.  Cheko Adkins DO  Electronically signed by Viry Lim LPN on 43/5/5678 at 6:75 PM
oriented x3. Ambulating without difficulty. There are hypertonic and tender fibers noted today in the cervical, thoracic and lumbar paraspinal muscles. Joint fixation is noted with motion screening at T12-L2, L5-S1, bilateral SI joints, C5-6 and T3-5. Joy Lloyd was seen today for back pain. Diagnoses and all orders for this visit:    Segmental and somatic dysfunction of lumbar region    Segmental and somatic dysfunction of thoracic region    Segmental and somatic dysfunction of pelvic region    Segmental and somatic dysfunction of cervical region    Chronic bilateral low back pain without sciatica    Chronic bilateral thoracic back pain    Sacrococcygeal disorders, not elsewhere classified    Cervicalgia        Treatment Plan: Diversified manipulation was performed today to the affected segments in the cervical, thoracic, lumbar and SI/pelvic regions. Tolerated well.   I will see him back in 1 week or as needed for care      Seen By:  Bindu Artis

## 2021-10-19 ENCOUNTER — OFFICE VISIT (OUTPATIENT)
Dept: CHIROPRACTIC MEDICINE | Age: 26
End: 2021-10-19
Payer: COMMERCIAL

## 2021-10-19 VITALS — OXYGEN SATURATION: 97 % | HEART RATE: 91 BPM | TEMPERATURE: 98.1 F

## 2021-10-19 DIAGNOSIS — M54.6 CHRONIC BILATERAL THORACIC BACK PAIN: ICD-10-CM

## 2021-10-19 DIAGNOSIS — G89.29 CHRONIC BILATERAL THORACIC BACK PAIN: ICD-10-CM

## 2021-10-19 DIAGNOSIS — M99.02 SEGMENTAL AND SOMATIC DYSFUNCTION OF THORACIC REGION: ICD-10-CM

## 2021-10-19 DIAGNOSIS — M54.2 CERVICALGIA: ICD-10-CM

## 2021-10-19 DIAGNOSIS — M99.05 SEGMENTAL AND SOMATIC DYSFUNCTION OF PELVIC REGION: ICD-10-CM

## 2021-10-19 DIAGNOSIS — G89.29 CHRONIC BILATERAL LOW BACK PAIN WITHOUT SCIATICA: ICD-10-CM

## 2021-10-19 DIAGNOSIS — M99.03 SEGMENTAL AND SOMATIC DYSFUNCTION OF LUMBAR REGION: Primary | ICD-10-CM

## 2021-10-19 DIAGNOSIS — M54.50 CHRONIC BILATERAL LOW BACK PAIN WITHOUT SCIATICA: ICD-10-CM

## 2021-10-19 DIAGNOSIS — M99.01 SEGMENTAL AND SOMATIC DYSFUNCTION OF CERVICAL REGION: ICD-10-CM

## 2021-10-19 DIAGNOSIS — M53.3 SACROCOCCYGEAL DISORDERS, NOT ELSEWHERE CLASSIFIED: ICD-10-CM

## 2021-10-19 PROCEDURE — 98941 CHIROPRACT MANJ 3-4 REGIONS: CPT | Performed by: CHIROPRACTOR

## 2021-10-19 NOTE — PROGRESS NOTES
10/19/21  [facility] : 1995 Sex: adult  Age: 23 y.o. Chief Complaint   Patient presents with    Back Pain       HPI:   Pain is has improved. On average, pain is perceived as mild (1-3  pain scale). Change in quality of symptoms: no.  He denies any other symptoms. Current Outpatient Medications:     primidone (MYSOLINE) 50 MG tablet, Take 1 tablet by mouth nightly, Disp: 30 tablet, Rfl: 3    VYVANSE 60 MG CAPS, Take 1 tablet by mouth daily. , Disp: , Rfl:     triamcinolone (KENALOG) 0.1 % cream, Apply topically 2 times daily. , Disp: 80 g, Rfl: 3    testosterone cypionate (DEPOTESTOTERONE CYPIONATE) 200 MG/ML injection, Inject 50 mg into the muscle once a week., Disp: , Rfl:     chlorhexidine (PERIDEX) 0.12 % solution, TAKE 15 ML BY MOUTH TWICE DAILY AFTER MEALS *SWISH IN MOUTH FOR 30 SECONDS THEN SPIT OUT*, Disp: , Rfl:     vitamin D (ERGOCALCIFEROL) 1.25 MG (24436 UT) CAPS capsule, TAKE ONE CAPSULE ONE TIME A WEEK, Disp: 4 capsule, Rfl: 3    fluticasone (FLONASE) 50 MCG/ACT nasal spray, 2 sprays by Each Nostril route daily, Disp: 3 Bottle, Rfl: 1    PARoxetine (PAXIL) 10 MG tablet, Take 1 tablet by mouth daily, Disp: , Rfl:     esomeprazole (NEXIUM) 40 MG delayed release capsule, TAKE 1 CAPSULE BY MOUTH EVERY DAY IN THE MORNING BEFORE BREAKFAST WITH WATER, Disp: , Rfl:     buPROPion (WELLBUTRIN XL) 150 MG extended release tablet, Take 150 mg by mouth every morning, Disp: , Rfl:     Exam:   Vitals:    10/19/21 1115   Pulse: 91   Temp: 98.1 °F (36.7 °C)   SpO2: 97%         There are hypertonic and tender fibers noted today in the cervical, thoracic and lumbar paraspinal muscles. Trigger points in bilateral trapezius, suprascapular portion. No midline pain. Joint fixation is noted with motion screening at C5-6, T3-6, T12-L1, L5-S1, right SI joint. Sarah Webb was seen today for back pain.     Diagnoses and all orders for this visit:    Segmental and somatic dysfunction of lumbar region    Segmental and somatic dysfunction of thoracic region    Segmental and somatic dysfunction of pelvic region    Chronic bilateral thoracic back pain    Chronic bilateral low back pain without sciatica    Segmental and somatic dysfunction of cervical region    Sacrococcygeal disorders, not elsewhere classified    Cervicalgia        Treatment Plan: Continued with diversified manipulation today to listed segments in the cervical, thoracic, lumbar and pelvic/SI regions. Tolerated well.   I will see him back in 1 week as needed for further care      Seen By:  Drea Gonzales

## 2021-10-27 ENCOUNTER — OFFICE VISIT (OUTPATIENT)
Dept: CHIROPRACTIC MEDICINE | Age: 26
End: 2021-10-27
Payer: COMMERCIAL

## 2021-10-27 VITALS — OXYGEN SATURATION: 98 % | HEART RATE: 80 BPM | TEMPERATURE: 98 F

## 2021-10-27 DIAGNOSIS — G89.29 CHRONIC BILATERAL THORACIC BACK PAIN: ICD-10-CM

## 2021-10-27 DIAGNOSIS — M99.05 SEGMENTAL AND SOMATIC DYSFUNCTION OF PELVIC REGION: ICD-10-CM

## 2021-10-27 DIAGNOSIS — M99.01 SEGMENTAL AND SOMATIC DYSFUNCTION OF CERVICAL REGION: ICD-10-CM

## 2021-10-27 DIAGNOSIS — M99.02 SEGMENTAL AND SOMATIC DYSFUNCTION OF THORACIC REGION: ICD-10-CM

## 2021-10-27 DIAGNOSIS — M54.50 CHRONIC BILATERAL LOW BACK PAIN WITHOUT SCIATICA: ICD-10-CM

## 2021-10-27 DIAGNOSIS — M53.3 SACROCOCCYGEAL DISORDERS, NOT ELSEWHERE CLASSIFIED: ICD-10-CM

## 2021-10-27 DIAGNOSIS — M99.03 SEGMENTAL AND SOMATIC DYSFUNCTION OF LUMBAR REGION: Primary | ICD-10-CM

## 2021-10-27 DIAGNOSIS — M54.6 CHRONIC BILATERAL THORACIC BACK PAIN: ICD-10-CM

## 2021-10-27 DIAGNOSIS — G89.29 CHRONIC BILATERAL LOW BACK PAIN WITHOUT SCIATICA: ICD-10-CM

## 2021-10-27 DIAGNOSIS — M54.2 CERVICALGIA: ICD-10-CM

## 2021-10-27 PROCEDURE — 98941 CHIROPRACT MANJ 3-4 REGIONS: CPT | Performed by: CHIROPRACTOR

## 2021-10-27 NOTE — PROGRESS NOTES
10/27/21  Meredith Zimmerman : 1995 Sex: adult  Age: 23 y.o. Chief Complaint   Patient presents with    Neck Pain    Back Pain       HPI:   No issues. He states however he has been working several days in a row, and feels this is provoked symptoms. Noticed it over the weekend as well when he was putting together some furniture. Describing tightness and stiffness throughout the neck mid back and lower back today. No new radiating pains. No new injuries. .            Current Outpatient Medications:     primidone (MYSOLINE) 50 MG tablet, Take 1 tablet by mouth nightly, Disp: 30 tablet, Rfl: 3    VYVANSE 60 MG CAPS, Take 1 tablet by mouth daily. , Disp: , Rfl:     triamcinolone (KENALOG) 0.1 % cream, Apply topically 2 times daily. , Disp: 80 g, Rfl: 3    testosterone cypionate (DEPOTESTOTERONE CYPIONATE) 200 MG/ML injection, Inject 50 mg into the muscle once a week., Disp: , Rfl:     chlorhexidine (PERIDEX) 0.12 % solution, TAKE 15 ML BY MOUTH TWICE DAILY AFTER MEALS *SWISH IN MOUTH FOR 30 SECONDS THEN SPIT OUT*, Disp: , Rfl:     vitamin D (ERGOCALCIFEROL) 1.25 MG (05238 UT) CAPS capsule, TAKE ONE CAPSULE ONE TIME A WEEK, Disp: 4 capsule, Rfl: 3    fluticasone (FLONASE) 50 MCG/ACT nasal spray, 2 sprays by Each Nostril route daily, Disp: 3 Bottle, Rfl: 1    PARoxetine (PAXIL) 10 MG tablet, Take 1 tablet by mouth daily, Disp: , Rfl:     esomeprazole (NEXIUM) 40 MG delayed release capsule, TAKE 1 CAPSULE BY MOUTH EVERY DAY IN THE MORNING BEFORE BREAKFAST WITH WATER, Disp: , Rfl:     buPROPion (WELLBUTRIN XL) 150 MG extended release tablet, Take 150 mg by mouth every morning, Disp: , Rfl:     Exam:   Vitals:    10/27/21 1143   Pulse: 80   Temp: 98 °F (36.7 °C)   SpO2: 98%         There are hypertonic and tender fibers noted today in the cervical, thoracic and lumbar paraspinal muscles. Joint fixation is noted with motion screening at C5-6, T3-5, T7-9, T12-L1, L3-4.   And right SI joint    Hamilton Cantgage was seen today for neck pain and back pain. Diagnoses and all orders for this visit:    Segmental and somatic dysfunction of lumbar region    Segmental and somatic dysfunction of thoracic region    Segmental and somatic dysfunction of pelvic region    Chronic bilateral thoracic back pain    Chronic bilateral low back pain without sciatica    Segmental and somatic dysfunction of cervical region    Cervicalgia    Sacrococcygeal disorders, not elsewhere classified        Treatment Plan: Vibratory massage to the thoracic and lumbar regions for 2 minutes. Then diversified manipulation to the affected segments in the cervical, thoracic, lumbar and pelvic/SI regions. Tolerated well.   I will see him back in 2 weeks for further care      Seen By:  Gwen Marcum

## 2021-11-05 ENCOUNTER — OFFICE VISIT (OUTPATIENT)
Dept: FAMILY MEDICINE CLINIC | Age: 26
End: 2021-11-05
Payer: COMMERCIAL

## 2021-11-05 VITALS
RESPIRATION RATE: 16 BRPM | BODY MASS INDEX: 22.19 KG/M2 | OXYGEN SATURATION: 98 % | TEMPERATURE: 97.9 F | HEIGHT: 60 IN | HEART RATE: 97 BPM | SYSTOLIC BLOOD PRESSURE: 98 MMHG | DIASTOLIC BLOOD PRESSURE: 64 MMHG | WEIGHT: 113 LBS

## 2021-11-05 DIAGNOSIS — K13.0 ANGULAR CHEILITIS: Primary | ICD-10-CM

## 2021-11-05 PROCEDURE — 1036F TOBACCO NON-USER: CPT | Performed by: INTERNAL MEDICINE

## 2021-11-05 PROCEDURE — 99213 OFFICE O/P EST LOW 20 MIN: CPT | Performed by: INTERNAL MEDICINE

## 2021-11-05 PROCEDURE — G8427 DOCREV CUR MEDS BY ELIG CLIN: HCPCS | Performed by: INTERNAL MEDICINE

## 2021-11-05 PROCEDURE — G8420 CALC BMI NORM PARAMETERS: HCPCS | Performed by: INTERNAL MEDICINE

## 2021-11-05 PROCEDURE — G8484 FLU IMMUNIZE NO ADMIN: HCPCS | Performed by: INTERNAL MEDICINE

## 2021-11-05 RX ORDER — TESTOSTERONE 4 MG/D
PATCH TRANSDERMAL
COMMUNITY
Start: 2021-10-14 | End: 2021-11-23 | Stop reason: ALTCHOICE

## 2021-11-05 RX ORDER — DEXTROAMPHETAMINE SACCHARATE, AMPHETAMINE ASPARTATE, DEXTROAMPHETAMINE SULFATE AND AMPHETAMINE SULFATE 5; 5; 5; 5 MG/1; MG/1; MG/1; MG/1
20 TABLET ORAL DAILY
COMMUNITY
End: 2022-01-11

## 2021-11-05 NOTE — PROGRESS NOTES
Chief Complaint:   Eczema (Possible dry skin corner of mouth RT x 2 days )      History of Present Illness   Source of history provided by:  patientSheron Matos is a 23 y.o. old adult who has a past medical history of:   Past Medical History:   Diagnosis Date    Acid reflux     Anxiety     Depression     Excessive physiologic tremor 3/29/2021    Gender dysphoria in adult 3/29/2021    Sleep disturbance 3/29/2021    Tremor 3/29/2021    Vitamin D deficiency     Presents to the walk in clinic for evaluation of a dry lesion to the right corner of his mouth. Denies any known cause for the lesion including any new soaps, detergents, lotions, foods, or medications. Since onset the symptoms have progressed. Denies any bleeding or drainage. Denies any lymphangitic streaking, fever, chills, HA , dyspnea, dysphagia, recent illness, myalgias, vomiting, or lethargy. ROS    Unless otherwise stated in this report or unable to obtain because of the patient's clinical or mental status as evidenced by the medical record, this patients's positive and negative responses for Review of Systems, constitutional, psych, eyes, ENT, cardiovascular, respiratory, gastrointestinal, neurological, genitourinary, musculoskeletal, integument systems and systems related to the presenting problem are either stated in the preceding or were not pertinent or were negative for the symptoms and/or complaints related to the medical problem. Past Surgical History:  has no past surgical history on file. Social History:  reports that he has never smoked. He has never used smokeless tobacco.  Drug: Marijuana Champ Cue). He reports that he does not drink alcohol. Family History: family history includes Asthma in his paternal aunt; Cancer in his maternal grandfather; Depression in his maternal uncle;  Heart Attack in his paternal grandfather; High Blood Pressure in his father and maternal grandmother; Hypothyroidism in his maternal grandmother and mother; Irritable Bowel Syndrome in his sister; Stroke in his paternal grandmother. Allergies: Patient has no known allergies. Physical Exam         VS:  BP 98/64   Pulse 97   Temp 97.9 °F (36.6 °C)   Resp 16   Ht 5' (1.524 m)   Wt 113 lb (51.3 kg)   SpO2 98%   BMI 22.07 kg/m²    Oxygen Saturation Interpretation: Normal.    Constitutional:  Alert, development consistent with age. HEENT:  NC/NT. Airway patent. Eyes:  PERRL, EOMI, no discharge. Lungs:  CTAB, no wheezing, rales, or rhonchi  Heart:  RRR without pathologic murmurs  Skin:  Normal turgor and appropriately dry to touch. Small dry area to right corner of mouth, no crusting or drainage present. no signs of secondary infection including TTP, pustules, induration, or fluctuance. No bleeding or discharge noted. No lymphangitic streaking. Neurological:  Orientation age-appropriate unless noted elseware. Motor functions intact. Lab / Imaging Results   (All laboratory and radiology results have been personally reviewed by myself)  Labs:      Imaging: All Radiology results interpreted by Radiologist unless otherwise noted. Assessment / Plan     Impression(s):  1. Angular cheilitis      Disposition:  Disposition: Discharge to home. Rash appears most consistent with dry skin or possibly angular cheilitis. Recommended Aquaphor lip therapy. F/u PCP in 3-5 days if symptoms persist. ED sooner if symptoms worsen or change. ED immediately with any fever, dyspnea, dysphagia, CP, spreading erythema, lymphangitic streaking, or additional signs of secondary infection which were discussed. Pt is in agreement with this care plan. All questions answered.      Kevin Limon DO  11/5/2021  12:44 PM

## 2021-11-11 ENCOUNTER — OFFICE VISIT (OUTPATIENT)
Dept: CHIROPRACTIC MEDICINE | Age: 26
End: 2021-11-11
Payer: COMMERCIAL

## 2021-11-11 VITALS
HEIGHT: 60 IN | OXYGEN SATURATION: 98 % | TEMPERATURE: 98.8 F | HEART RATE: 83 BPM | BODY MASS INDEX: 22.38 KG/M2 | WEIGHT: 114 LBS

## 2021-11-11 DIAGNOSIS — M54.50 CHRONIC BILATERAL LOW BACK PAIN WITHOUT SCIATICA: ICD-10-CM

## 2021-11-11 DIAGNOSIS — M99.02 SEGMENTAL AND SOMATIC DYSFUNCTION OF THORACIC REGION: ICD-10-CM

## 2021-11-11 DIAGNOSIS — M53.3 SACROCOCCYGEAL DISORDERS, NOT ELSEWHERE CLASSIFIED: ICD-10-CM

## 2021-11-11 DIAGNOSIS — M54.6 CHRONIC BILATERAL THORACIC BACK PAIN: ICD-10-CM

## 2021-11-11 DIAGNOSIS — M99.01 SEGMENTAL AND SOMATIC DYSFUNCTION OF CERVICAL REGION: ICD-10-CM

## 2021-11-11 DIAGNOSIS — M99.03 SEGMENTAL AND SOMATIC DYSFUNCTION OF LUMBAR REGION: Primary | ICD-10-CM

## 2021-11-11 DIAGNOSIS — G89.29 CHRONIC BILATERAL THORACIC BACK PAIN: ICD-10-CM

## 2021-11-11 DIAGNOSIS — G89.29 CHRONIC BILATERAL LOW BACK PAIN WITHOUT SCIATICA: ICD-10-CM

## 2021-11-11 DIAGNOSIS — M99.05 SEGMENTAL AND SOMATIC DYSFUNCTION OF PELVIC REGION: ICD-10-CM

## 2021-11-11 DIAGNOSIS — M54.2 CERVICALGIA: ICD-10-CM

## 2021-11-11 PROCEDURE — 98941 CHIROPRACT MANJ 3-4 REGIONS: CPT | Performed by: CHIROPRACTOR

## 2021-11-11 NOTE — PROGRESS NOTES
21  Felix Bennett : 1995 Sex: adult  Age: 23 y.o. Chief Complaint   Patient presents with    Neck Pain    Back Pain     whole back       HPI:   Pain is has worsened. He started a new job at the [employer], has been sitting in class for a while. Describing tightness and stiffness throughout the neck, mid back and lower back today. No new symptoms or injuries. Current Outpatient Medications:     ANDRODERM 4 MG/24HR PT24, APPLY 1 PATCH TOPICALLY TO THE SKIN EVERY DAY AS DIRECTED, Disp: , Rfl:     amphetamine-dextroamphetamine (ADDERALL, 20MG,) 20 MG tablet, Take 20 mg by mouth daily. , Disp: , Rfl:     primidone (MYSOLINE) 50 MG tablet, Take 1 tablet by mouth nightly, Disp: 30 tablet, Rfl: 3    VYVANSE 60 MG CAPS, Take 1 tablet by mouth daily. , Disp: , Rfl:     triamcinolone (KENALOG) 0.1 % cream, Apply topically 2 times daily. , Disp: 80 g, Rfl: 3    chlorhexidine (PERIDEX) 0.12 % solution, TAKE 15 ML BY MOUTH TWICE DAILY AFTER MEALS *SWISH IN MOUTH FOR 30 SECONDS THEN SPIT OUT*, Disp: , Rfl:     vitamin D (ERGOCALCIFEROL) 1.25 MG (33636 UT) CAPS capsule, TAKE ONE CAPSULE ONE TIME A WEEK, Disp: 4 capsule, Rfl: 3    fluticasone (FLONASE) 50 MCG/ACT nasal spray, 2 sprays by Each Nostril route daily, Disp: 3 Bottle, Rfl: 1    PARoxetine (PAXIL) 10 MG tablet, Take 1 tablet by mouth daily, Disp: , Rfl:     esomeprazole (NEXIUM) 40 MG delayed release capsule, TAKE 1 CAPSULE BY MOUTH EVERY DAY IN THE MORNING BEFORE BREAKFAST WITH WATER, Disp: , Rfl:     buPROPion (WELLBUTRIN XL) 150 MG extended release tablet, Take 150 mg by mouth every morning, Disp: , Rfl:     testosterone cypionate (DEPOTESTOTERONE CYPIONATE) 200 MG/ML injection, Inject 50 mg into the muscle once a week., Disp: , Rfl:     Exam:   Vitals:    21 1110   Pulse: 83   Temp: 98.8 °F (37.1 °C)   SpO2: 98%     There are trigger points in the bilateral trapezius today. No midline pain in spinal regions.       There are hypertonic and tender fibers noted today in the lumbar, thoracic and cervical paraspinal muscles. Joint fixation is noted with motion screening at C5-6, C7-T1, T4-6, T12-L1, L5-S1 and bilateral SI joints. Analy Reddy was seen today for neck pain and back pain. Diagnoses and all orders for this visit:    Segmental and somatic dysfunction of lumbar region    Segmental and somatic dysfunction of thoracic region    Segmental and somatic dysfunction of pelvic region    Segmental and somatic dysfunction of cervical region    Chronic bilateral thoracic back pain    Chronic bilateral low back pain without sciatica    Cervicalgia    Sacrococcygeal disorders, not elsewhere classified        Treatment Plan:  Diversified manipulation to the listed cervical, thoracic, lumbar, pelvic segments. Vibratory massage to the thoracic and lumbar regions as well for 2 minutes. Tolerated well. He can follow-up with me as needed for further care.       Seen By:  Arnoldo Gutierrez DC

## 2021-11-23 ENCOUNTER — OFFICE VISIT (OUTPATIENT)
Dept: FAMILY MEDICINE CLINIC | Age: 26
End: 2021-11-23
Payer: COMMERCIAL

## 2021-11-23 VITALS
BODY MASS INDEX: 22.19 KG/M2 | SYSTOLIC BLOOD PRESSURE: 102 MMHG | OXYGEN SATURATION: 98 % | HEART RATE: 63 BPM | WEIGHT: 113 LBS | TEMPERATURE: 98 F | DIASTOLIC BLOOD PRESSURE: 70 MMHG | RESPIRATION RATE: 18 BRPM | HEIGHT: 60 IN

## 2021-11-23 DIAGNOSIS — J02.0 STREP THROAT: ICD-10-CM

## 2021-11-23 DIAGNOSIS — R25.1 EXCESSIVE PHYSIOLOGIC TREMOR: ICD-10-CM

## 2021-11-23 DIAGNOSIS — F32.A DEPRESSION, UNSPECIFIED DEPRESSION TYPE: ICD-10-CM

## 2021-11-23 DIAGNOSIS — E55.9 VITAMIN D DEFICIENCY: ICD-10-CM

## 2021-11-23 DIAGNOSIS — J02.9 SORE THROAT: Primary | ICD-10-CM

## 2021-11-23 LAB — S PYO AG THROAT QL: POSITIVE

## 2021-11-23 PROCEDURE — 99213 OFFICE O/P EST LOW 20 MIN: CPT | Performed by: STUDENT IN AN ORGANIZED HEALTH CARE EDUCATION/TRAINING PROGRAM

## 2021-11-23 PROCEDURE — 1036F TOBACCO NON-USER: CPT | Performed by: STUDENT IN AN ORGANIZED HEALTH CARE EDUCATION/TRAINING PROGRAM

## 2021-11-23 PROCEDURE — 87880 STREP A ASSAY W/OPTIC: CPT | Performed by: STUDENT IN AN ORGANIZED HEALTH CARE EDUCATION/TRAINING PROGRAM

## 2021-11-23 PROCEDURE — G8420 CALC BMI NORM PARAMETERS: HCPCS | Performed by: STUDENT IN AN ORGANIZED HEALTH CARE EDUCATION/TRAINING PROGRAM

## 2021-11-23 PROCEDURE — G8484 FLU IMMUNIZE NO ADMIN: HCPCS | Performed by: STUDENT IN AN ORGANIZED HEALTH CARE EDUCATION/TRAINING PROGRAM

## 2021-11-23 PROCEDURE — G8427 DOCREV CUR MEDS BY ELIG CLIN: HCPCS | Performed by: STUDENT IN AN ORGANIZED HEALTH CARE EDUCATION/TRAINING PROGRAM

## 2021-11-23 RX ORDER — ERGOCALCIFEROL 1.25 MG/1
CAPSULE ORAL
Qty: 4 CAPSULE | Refills: 3 | Status: SHIPPED
Start: 2021-11-23 | End: 2021-11-30

## 2021-11-23 RX ORDER — PAROXETINE 10 MG/1
10 TABLET, FILM COATED ORAL DAILY
Qty: 30 TABLET | Refills: 3 | Status: SHIPPED | OUTPATIENT
Start: 2021-11-23

## 2021-11-23 RX ORDER — AMOXICILLIN 500 MG/1
500 CAPSULE ORAL 2 TIMES DAILY
Qty: 20 CAPSULE | Refills: 0 | Status: SHIPPED | OUTPATIENT
Start: 2021-11-23 | End: 2021-12-03

## 2021-11-23 RX ORDER — PRIMIDONE 50 MG/1
50 TABLET ORAL NIGHTLY
Qty: 30 TABLET | Refills: 3 | Status: SHIPPED
Start: 2021-11-23 | End: 2022-02-22 | Stop reason: SDUPTHER

## 2021-11-23 NOTE — PROGRESS NOTES
Current Outpatient Medications:     vitamin D (ERGOCALCIFEROL) 1.25 MG (07520 UT) CAPS capsule, TAKE ONE CAPSULE ONE TIME A WEEK, Disp: 4 capsule, Rfl: 3    primidone (MYSOLINE) 50 MG tablet, Take 1 tablet by mouth nightly, Disp: 30 tablet, Rfl: 3    PARoxetine (PAXIL) 10 MG tablet, Take 1 tablet by mouth daily, Disp: 30 tablet, Rfl: 3    amoxicillin (AMOXIL) 500 MG capsule, Take 1 capsule by mouth 2 times daily for 10 days, Disp: 20 capsule, Rfl: 0    amphetamine-dextroamphetamine (ADDERALL, 20MG,) 20 MG tablet, Take 20 mg by mouth daily. , Disp: , Rfl:     VYVANSE 60 MG CAPS, Take 1 tablet by mouth daily. , Disp: , Rfl:     triamcinolone (KENALOG) 0.1 % cream, Apply topically 2 times daily. , Disp: 80 g, Rfl: 3    chlorhexidine (PERIDEX) 0.12 % solution, TAKE 15 ML BY MOUTH TWICE DAILY AFTER MEALS *SWISH IN MOUTH FOR 30 SECONDS THEN SPIT OUT*, Disp: , Rfl:     fluticasone (FLONASE) 50 MCG/ACT nasal spray, 2 sprays by Each Nostril route daily, Disp: 3 Bottle, Rfl: 1    esomeprazole (NEXIUM) 40 MG delayed release capsule, TAKE 1 CAPSULE BY MOUTH EVERY DAY IN THE MORNING BEFORE BREAKFAST WITH WATER, Disp: , Rfl:     buPROPion (WELLBUTRIN XL) 150 MG extended release tablet, Take 150 mg by mouth every morning, Disp: , Rfl:     ANDRODERM 4 MG/24HR PT24, APPLY 1 PATCH TOPICALLY TO THE SKIN EVERY DAY AS DIRECTED, Disp: , Rfl:     testosterone cypionate (DEPOTESTOTERONE CYPIONATE) 200 MG/ML injection, Inject 50 mg into the muscle once a week., Disp: , Rfl:     Allergies:   No Known Allergies    Social History:     Social History     Tobacco Use    Smoking status: Never Smoker    Smokeless tobacco: Never Used   Substance Use Topics    Alcohol use: Never    Drug use: Not on file     Comment: last used in September 2020         Physical Exam:     Vitals:    11/23/21 1021   BP: 102/70   Pulse: 63   Resp: 18   Temp: 98 °F (36.7 °C)   TempSrc: Temporal   SpO2: 98%   Weight: 113 lb (51.3 kg)   Height: 5' (1.524 check-up and pharyngitis. Diagnoses and all orders for this visit:    Sore throat  -     POCT rapid strep A    Vitamin D deficiency  -     vitamin D (ERGOCALCIFEROL) 1.25 MG (71640 UT) CAPS capsule; TAKE ONE CAPSULE ONE TIME A WEEK    Excessive physiologic tremor  -     primidone (MYSOLINE) 50 MG tablet; Take 1 tablet by mouth nightly    Depression, unspecified depression type  -     PARoxetine (PAXIL) 10 MG tablet; Take 1 tablet by mouth daily    Strep throat  -     amoxicillin (AMOXIL) 500 MG capsule; Take 1 capsule by mouth 2 times daily for 10 days        - Patient is directed to take the prescribed medication as ordered. Discussed taking vitamin D, vitamin C, and zinc supplementation.     -Increase fluids and rest. Symptomatic relief discussed including Tylenol prn pain/fever. Schedule virtual f/u with PCP in 2-3 days. Red flag symptoms were discussed with the patient today. The patient is directed to go the ED if symptoms worsen or change. Pt verbalizes understanding and is in agreement with plan of care. All questions answered.     SIGNATURE: Michelle Noel DO

## 2021-11-30 DIAGNOSIS — E55.9 VITAMIN D DEFICIENCY: ICD-10-CM

## 2021-11-30 RX ORDER — ERGOCALCIFEROL 1.25 MG/1
CAPSULE ORAL
Qty: 4 CAPSULE | Refills: 3 | Status: SHIPPED
Start: 2021-11-30 | End: 2022-02-22 | Stop reason: SDUPTHER

## 2021-11-30 NOTE — TELEPHONE ENCOUNTER
Last Appointment:  11/23/2021  Future Appointments   Date Time Provider Bonny Clark   12/6/2021 11:00 AM [facility] [place] [facility]

## 2021-12-03 ENCOUNTER — APPOINTMENT (OUTPATIENT)
Dept: GENERAL RADIOLOGY | Age: 26
End: 2021-12-03
Payer: COMMERCIAL

## 2021-12-03 ENCOUNTER — HOSPITAL ENCOUNTER (EMERGENCY)
Age: 26
Discharge: HOME | End: 2021-12-03
Payer: COMMERCIAL

## 2021-12-03 VITALS
RESPIRATION RATE: 18 BRPM | WEIGHT: 114 LBS | SYSTOLIC BLOOD PRESSURE: 125 MMHG | TEMPERATURE: 98 F | BODY MASS INDEX: 22.38 KG/M2 | OXYGEN SATURATION: 98 % | DIASTOLIC BLOOD PRESSURE: 69 MMHG | HEIGHT: 60 IN | HEART RATE: 74 BPM

## 2021-12-03 DIAGNOSIS — S49.92XA INJURY OF LEFT SHOULDER, INITIAL ENCOUNTER: Primary | ICD-10-CM

## 2021-12-03 PROCEDURE — 73030 X-RAY EXAM OF SHOULDER: CPT

## 2021-12-03 PROCEDURE — 99282 EMERGENCY DEPT VISIT SF MDM: CPT

## 2021-12-03 PROCEDURE — 73050 X-RAY EXAM OF SHOULDERS: CPT

## 2021-12-03 RX ORDER — NAPROXEN 500 MG/1
500 TABLET ORAL 2 TIMES DAILY PRN
Qty: 14 TABLET | Refills: 0 | Status: SHIPPED | OUTPATIENT
Start: 2021-12-03 | End: 2022-02-22

## 2021-12-03 ASSESSMENT — PAIN SCALES - GENERAL: PAINLEVEL_OUTOF10: 5

## 2021-12-03 ASSESSMENT — PAIN DESCRIPTION - PAIN TYPE: TYPE: ACUTE PAIN

## 2021-12-04 NOTE — ED PROVIDER NOTES
[address]  Department of Emergency Medicine   ED  Encounter Note  Admit Date/RoomTime: 12/3/2021 11:28 PM  ED Room: [place] [address]/C3    NAME: Stephanie Moser  : 1995  MRN: [id number]     Chief Complaint:  Shoulder Pain (reports moving equipment at work and [facility]" now complaining of shooting L shoulder pain with movement )    History of Present Illness       Stephanie Moser is a 23 y.o. old adult who presents to the emergency department by private vehicle, for Left shoulder pain which occured 7 PM prior to arrival.  The complaint is due to moving heavy boxes. Patient has no prior history of pain/injury with regards to today's visit. He is right handed. Since onset the symptoms have been remaining constant. His pain is aggraveated by any movement and relieved by nothing. He denies any head injury, headache, loss of consciousness, confusion, dizziness, neck pain, chest pain, abdominal pain, back pain, numbness, weakness, blurred vision, nausea or vomiting. ROS   Pertinent positives and negatives are stated within HPI, all other systems reviewed and are negative. Past Medical History:  has a past medical history of Acid reflux, Anxiety, Depression, Excessive physiologic tremor, Gender dysphoria in adult, Sleep disturbance, Tremor, and Vitamin D deficiency. Surgical History:  has no past surgical history on file. Social History:  reports that he has never smoked. He has never used smokeless tobacco.  Drug: Marijuana Lainey Postin). He reports that he does not drink alcohol. Family History: family history includes Asthma in his paternal aunt; Cancer in his maternal grandfather; Depression in his maternal uncle; Heart Attack in his paternal grandfather; High Blood Pressure in his father and maternal grandmother; Hypothyroidism in his maternal grandmother and mother; Irritable Bowel Syndrome in his sister; Stroke in his paternal grandmother.      Allergies: Patient has no known allergies. Physical Exam   Oxygen Saturation Interpretation: Normal.        ED Triage Vitals   BP Temp Temp src Pulse Resp SpO2 Height Weight   12/03/21 2220 12/03/21 2220 -- 12/03/21 2210 12/03/21 2220 12/03/21 2210 12/03/21 2220 12/03/21 2220   125/69 98 °F (36.7 °C)  86 18 98 % 5' (1.524 m) 114 lb (51.7 kg)       Constitutional:  Alert, development consistent with age. Neck:  Normal ROM. Supple. Non-tender. Left Shoulder: anterior, AC joint. Tenderness: severe              Swelling: None. Deformity: no deformity observed/palpated. ROM: diminished range with pain. Skin:  no wounds, erythema, or swelling. Neurovascular: Motor deficit: none. Sensory deficit: none. Pulse deficit: none. Capillary refill: normal.    Bilateral Elbow: diffusely across elbow            Tenderness:  none. Swelling: None. Deformity: no deformity observed/palpated. ROM: full painless ROM. Skin:  no wounds, erythema, or swelling. Lymphatics: No lymphangitis or edema noted  Neurological:  Oriented. Motor functions intact. Lab / Imaging Results   (All laboratory and radiology results have been personally reviewed by myself)  Labs:  No results found for this visit on 12/03/21. Imaging: All Radiology results interpreted by Radiologist unless otherwise noted. XR SHOULDER LEFT (MIN 2 VIEWS)   Final Result   No acute abnormality. XR AC JOINTS BILATERAL W WO WEIGHTS   Final Result   Normal in symmetrical bilateral AC joints. ED Course / Medical Decision Making   Medications - No data to display     Re-examination:  12/3/21       Time: 2300   Results discussed. Consult(s):   None    Procedure(s):   none    MDM:      Patient presents to the emergency department with left shoulder injury.   Radiographs were obtained and normal.  Discussed with the patient possibility of soft tissue injury not seen on x-rays. Patient to follow-up with corporate care. No other injuries. CSM intact distally. Patient given a sling for comfort. Patient to remove her arm from the sling several times per day to perform range of motion and prevent frozen shoulder. Prescription use and precautions discussed. Plan of Care/Counseling:  Melissa Vila PA-C reviewed today's visit with the patient in addition to providing specific details for the plan of care and counseling regarding the diagnosis and prognosis. Questions are answered at this time and are agreeable with the plan. Assessment      1. Injury of left shoulder, initial encounter       Plan   Discharged home. Patient condition is good    New Medications     New Prescriptions    NAPROXEN (NAPROSYN) 500 MG TABLET    Take 1 tablet by mouth 2 times daily as needed for Pain     Electronically signed by Melissa Vila PA-C   DD: 12/3/21  **This report was transcribed using voice recognition software. Every effort was made to ensure accuracy; however, inadvertent computerized transcription errors may be present.   END OF ED PROVIDER NOTE           Melissa Vila PA-C  12/03/21 1742  ATTENDING PROVIDER ATTESTATION:     Supervising Physician, on-site, available for consultation, non-participatory in the evaluation or care of this patient       Lola Ruby MD  12/03/21 2547

## 2021-12-06 ENCOUNTER — OFFICE VISIT (OUTPATIENT)
Dept: CHIROPRACTIC MEDICINE | Age: 26
End: 2021-12-06
Payer: COMMERCIAL

## 2021-12-06 VITALS — OXYGEN SATURATION: 100 % | TEMPERATURE: 96.4 F | HEART RATE: 78 BPM

## 2021-12-06 DIAGNOSIS — M53.3 SACROCOCCYGEAL DISORDERS, NOT ELSEWHERE CLASSIFIED: ICD-10-CM

## 2021-12-06 DIAGNOSIS — G89.29 CHRONIC BILATERAL THORACIC BACK PAIN: ICD-10-CM

## 2021-12-06 DIAGNOSIS — M54.2 CERVICALGIA: ICD-10-CM

## 2021-12-06 DIAGNOSIS — G89.29 CHRONIC BILATERAL LOW BACK PAIN WITHOUT SCIATICA: ICD-10-CM

## 2021-12-06 DIAGNOSIS — M99.05 SEGMENTAL AND SOMATIC DYSFUNCTION OF PELVIC REGION: ICD-10-CM

## 2021-12-06 DIAGNOSIS — M99.03 SEGMENTAL AND SOMATIC DYSFUNCTION OF LUMBAR REGION: Primary | ICD-10-CM

## 2021-12-06 DIAGNOSIS — M99.02 SEGMENTAL AND SOMATIC DYSFUNCTION OF THORACIC REGION: ICD-10-CM

## 2021-12-06 DIAGNOSIS — M54.6 CHRONIC BILATERAL THORACIC BACK PAIN: ICD-10-CM

## 2021-12-06 DIAGNOSIS — M99.01 SEGMENTAL AND SOMATIC DYSFUNCTION OF CERVICAL REGION: ICD-10-CM

## 2021-12-06 DIAGNOSIS — M54.50 CHRONIC BILATERAL LOW BACK PAIN WITHOUT SCIATICA: ICD-10-CM

## 2021-12-06 PROCEDURE — 98941 CHIROPRACT MANJ 3-4 REGIONS: CPT | Performed by: CHIROPRACTOR

## 2021-12-06 PROCEDURE — 99999 PR OFFICE/OUTPT VISIT,PROCEDURE ONLY: CPT | Performed by: CHIROPRACTOR

## 2021-12-06 NOTE — PROGRESS NOTES
21  Nieto High : 1995 Sex: adult  Age: 23 y.o. Chief Complaint   Patient presents with    Back Pain     3 wk f/u. no changes    Neck Pain       HPI:   Pain is has worsened slightly. Patient had an episode last week, while at work was pushing on a large cage and injured the left shoulder. He has had to sleep in awkward position since then, feels it made his back worse. Did go to the ER for the shoulder issue, treated and released. Imaging negative. No new issues with respect to the back. Describes it as aching, tightness and stiffness throughout. Current Outpatient Medications:     naproxen (NAPROSYN) 500 MG tablet, Take 1 tablet by mouth 2 times daily as needed for Pain, Disp: 14 tablet, Rfl: 0    vitamin D (ERGOCALCIFEROL) 1.25 MG (81622 UT) CAPS capsule, TAKE 1 CAPSULE BY MOUTH ONCE A WEEK, Disp: 4 capsule, Rfl: 3    primidone (MYSOLINE) 50 MG tablet, Take 1 tablet by mouth nightly, Disp: 30 tablet, Rfl: 3    PARoxetine (PAXIL) 10 MG tablet, Take 1 tablet by mouth daily, Disp: 30 tablet, Rfl: 3    amphetamine-dextroamphetamine (ADDERALL, 20MG,) 20 MG tablet, Take 20 mg by mouth daily. , Disp: , Rfl:     VYVANSE 60 MG CAPS, Take 1 tablet by mouth daily. , Disp: , Rfl:     triamcinolone (KENALOG) 0.1 % cream, Apply topically 2 times daily. , Disp: 80 g, Rfl: 3    chlorhexidine (PERIDEX) 0.12 % solution, TAKE 15 ML BY MOUTH TWICE DAILY AFTER MEALS *SWISH IN MOUTH FOR 30 SECONDS THEN SPIT OUT*, Disp: , Rfl:     fluticasone (FLONASE) 50 MCG/ACT nasal spray, 2 sprays by Each Nostril route daily, Disp: 3 Bottle, Rfl: 1    esomeprazole (NEXIUM) 40 MG delayed release capsule, TAKE 1 CAPSULE BY MOUTH EVERY DAY IN THE MORNING BEFORE BREAKFAST WITH WATER, Disp: , Rfl:     buPROPion (WELLBUTRIN XL) 150 MG extended release tablet, Take 150 mg by mouth every morning, Disp: , Rfl:     testosterone cypionate (DEPOTESTOTERONE CYPIONATE) 200 MG/ML injection, Inject 50 mg into the muscle once a week., Disp: , Rfl:     Exam:   Vitals:    12/06/21 1102   Pulse: 78   Temp: 96.4 °F (35.8 °C)   SpO2: 100%     At the left shoulder, has some mild limitation of abduction with endrange pain. Nontender over the [facility] joint, clavicle, SC joint, intertubercular groove of the humerus, greater tuberosity of the humerus, subacromial space. Neer impingement is negative. Lova Rajas negative. Full can and empty can are both negative. Speeds test negative. There are hypertonic and tender fibers noted today in the cervical, thoracic and lumbar paraspinal muscles. Joint fixation is noted with motion screening at C5-6, T3-6, T12-L1, L5-S1, bilateral SI joints. Romel Cabrales was seen today for back pain and neck pain. Diagnoses and all orders for this visit:    Segmental and somatic dysfunction of lumbar region    Segmental and somatic dysfunction of thoracic region    Segmental and somatic dysfunction of pelvic region    Segmental and somatic dysfunction of cervical region    Chronic bilateral thoracic back pain    Chronic bilateral low back pain without sciatica    Cervicalgia    Sacrococcygeal disorders, not elsewhere classified    AC  Impression   Normal in symmetrical bilateral AC joints. Glenohumeral  FINDINGS:   Glenohumeral joint is normally aligned.  No evidence of acute fracture or   dislocation.  No abnormal periarticular calcifications.  The AC joint is   unremarkable in appearance.       Visualized lung is unremarkable.           Impression   No acute abnormality. Treatment Plan:    [facility] to [facility] shoulder injury. Will need to see PCP. Diversified manipulation to the listed cervical, thoracic, lumbar, pelvic segments.   Tolerated well      Seen By:  Rom Hoang DC

## 2021-12-14 ENCOUNTER — OFFICE VISIT (OUTPATIENT)
Dept: FAMILY MEDICINE CLINIC | Age: 26
End: 2021-12-14
Payer: COMMERCIAL

## 2021-12-14 VITALS
HEIGHT: 60 IN | RESPIRATION RATE: 16 BRPM | SYSTOLIC BLOOD PRESSURE: 110 MMHG | DIASTOLIC BLOOD PRESSURE: 70 MMHG | BODY MASS INDEX: 21.99 KG/M2 | OXYGEN SATURATION: 98 % | WEIGHT: 112 LBS | TEMPERATURE: 98.1 F | HEART RATE: 82 BPM

## 2021-12-14 DIAGNOSIS — M25.512 ACUTE PAIN OF LEFT SHOULDER: ICD-10-CM

## 2021-12-14 DIAGNOSIS — E55.9 VITAMIN D DEFICIENCY: Primary | ICD-10-CM

## 2021-12-14 DIAGNOSIS — R42 DIZZINESS: ICD-10-CM

## 2021-12-14 PROCEDURE — 1036F TOBACCO NON-USER: CPT | Performed by: STUDENT IN AN ORGANIZED HEALTH CARE EDUCATION/TRAINING PROGRAM

## 2021-12-14 PROCEDURE — G8484 FLU IMMUNIZE NO ADMIN: HCPCS | Performed by: STUDENT IN AN ORGANIZED HEALTH CARE EDUCATION/TRAINING PROGRAM

## 2021-12-14 PROCEDURE — 99213 OFFICE O/P EST LOW 20 MIN: CPT | Performed by: STUDENT IN AN ORGANIZED HEALTH CARE EDUCATION/TRAINING PROGRAM

## 2021-12-14 PROCEDURE — G8427 DOCREV CUR MEDS BY ELIG CLIN: HCPCS | Performed by: STUDENT IN AN ORGANIZED HEALTH CARE EDUCATION/TRAINING PROGRAM

## 2021-12-14 PROCEDURE — G8420 CALC BMI NORM PARAMETERS: HCPCS | Performed by: STUDENT IN AN ORGANIZED HEALTH CARE EDUCATION/TRAINING PROGRAM

## 2021-12-14 ASSESSMENT — ENCOUNTER SYMPTOMS
CHEST TIGHTNESS: 0
VOMITING: 0
RHINORRHEA: 0
SHORTNESS OF BREATH: 0
EYE PAIN: 0
CONSTIPATION: 0
COUGH: 0
ABDOMINAL PAIN: 0
DIARRHEA: 0
BACK PAIN: 0
NAUSEA: 0

## 2021-12-14 NOTE — PROGRESS NOTES
2021    Susan Vincent (:  1995) is a 23 y.o. adult, here for evaluation of the following medical concerns:    HPI  Chief Complaint   Patient presents with    Shoulder Pain     left. He was seen in the ER a couple of weeks ago. Pain is getting better. Needs  a note to return to work without any restrictions.  Medication Problem     He has questions on his vitamin D. Can he take a daily vitamin D and the weekly dose. Patient is a 71-year-old male here today for a follow-up on his left shoulder pain. He did go to the emergency room and was seen. I did review the x-rays and the notes done from the emergency room. X-ray revealed normal shoulders. He states the pain is getting better he thinks that it was because he was lifting heavy boxes and moving heavy boxes at work. However he does need a note now to go back to work without any restrictions. He also has questions on the vitamin D. He has been taking once a week help but wants to know if he can take the daily medication. However we will check the vitamin D see if he is doing well if it is normal we can stop the weekly and have him take it daily. He would also like to have his iron checked because he states that his friends told him that he may have low iron because when he stands up he gets dizzy and some tunnel vision. He does not have periods anymore. Review of Systems   Constitutional: Negative for chills, fatigue and fever. HENT: Negative for congestion, ear pain, postnasal drip and rhinorrhea. Eyes: Negative for pain. Respiratory: Negative for cough, chest tightness and shortness of breath. Cardiovascular: Negative for chest pain. Gastrointestinal: Negative for abdominal pain, constipation, diarrhea, nausea and vomiting. Genitourinary: Negative for difficulty urinating, dysuria and frequency. Musculoskeletal: Positive for arthralgias (left shoulder pain). Negative for back pain and myalgias.    Skin:  Vitamin D deficiency        History reviewed. No pertinent surgical history. Social History     Socioeconomic History    Marital status: Single     Spouse name: Not on file    Number of children: Not on file    Years of education: Not on file    Highest education level: Not on file   Occupational History    Not on file   Tobacco Use    Smoking status: Never Smoker    Smokeless tobacco: Never Used   Substance and Sexual Activity    Alcohol use: Never    Drug use: Not on file     Comment: last used in September 2020    Sexual activity: Yes   Other Topics Concern    Not on file   Social History Narrative    Not on file     Social Determinants of Health     Financial Resource Strain: Medium Risk    Difficulty of Paying Living Expenses: Somewhat hard   Food Insecurity: No Food Insecurity    Worried About Running Out of Food in the Last Year: Never true    Jackelyn of Food in the Last Year: Never true   Transportation Needs:     Lack of Transportation (Medical): Not on file    Lack of Transportation (Non-Medical):  Not on file   Physical Activity:     Days of Exercise per Week: Not on file    Minutes of Exercise per Session: Not on file   Stress:     Feeling of Stress : Not on file   Social Connections:     Frequency of Communication with Friends and Family: Not on file    Frequency of Social Gatherings with Friends and Family: Not on file    Attends Religion Services: Not on file    Active Member of [address] [place] [address] [employer] or Organizations: Not on file    Attends Club or Organization Meetings: Not on file    Marital Status: Not on file   Intimate Partner Violence:     Fear of Current or Ex-Partner: Not on file    Emotionally Abused: Not on file    Physically Abused: Not on file    Sexually Abused: Not on file   Housing Stability:     Unable to Pay for Housing in the Last Year: Not on file    Number of [place] in the Last Year: Not on file    Unstable Housing in the Last Year: Not on file Family History   Problem Relation Age of Onset    Hypothyroidism Mother     High Blood Pressure Father     Depression Maternal Uncle     Asthma Paternal Aunt     High Blood Pressure Maternal Grandmother     Hypothyroidism Maternal Grandmother     Cancer Maternal Grandfather     Stroke Paternal Grandmother     Heart Attack Paternal Grandfather     Irritable Bowel Syndrome Sister            Vitals:    12/14/21 1021   BP: 110/70   Site: Left Upper Arm   Pulse: 82   Resp: 16   Temp: 98.1 °F (36.7 °C)   TempSrc: Temporal   SpO2: 98%   Weight: 112 lb (50.8 kg)   Height: 5' (1.524 m)     Estimated body mass index is 21.87 kg/m² as calculated from the following:    Height as of this encounter: 5' (1.524 m). Weight as of this encounter: 112 lb (50.8 kg). Most Recent Labs  CBC  Lab Results   Component Value Date    WBC 8.6 03/02/2021    RBC 4.57 03/02/2021    HGB 13.8 03/02/2021    HCT 43.1 03/02/2021    MCV 94.3 03/02/2021     03/02/2021      CMP  Lab Results   Component Value Date     03/02/2021    K 4.7 03/02/2021     03/02/2021    CO2 24 03/02/2021    ANIONGAP 10 03/02/2021    GLUCOSE 86 03/02/2021    BUN 9 03/02/2021    CREATININE 0.8 03/02/2021    LABGLOM >60 03/02/2021    GFRAA >60 03/02/2021    CALCIUM 9.1 03/02/2021    PROT 7.3 03/02/2021    LABALBU 4.5 03/02/2021    BILITOT 0.3 03/02/2021    ALKPHOS 50 03/02/2021    AST 16 03/02/2021    ALT 20 03/02/2021     TSH  Lab Results   Component Value Date    TSH 1.990 03/02/2021     LIPID  Lab Results   Component Value Date    CHOL 134 03/16/2021    HDL 44 03/16/2021    LDLCALC 82 03/16/2021    TRIG 38 03/16/2021     VITAMIN D  Lab Results   Component Value Date    VITD25 38 02/15/2021        Physical Exam  Vitals and nursing note reviewed. Constitutional:       Appearance: Normal appearance. HENT:      Head: Normocephalic and atraumatic.       Right Ear: Tympanic membrane, ear canal and external ear normal.      Left Ear: Tympanic membrane, ear canal and external ear normal.      Nose: Nose normal.      Mouth/Throat:      Mouth: Mucous membranes are moist.   Eyes:      Extraocular Movements: Extraocular movements intact. Pupils: Pupils are equal, round, and reactive to light. Cardiovascular:      Rate and Rhythm: Normal rate and regular rhythm. Pulses: Normal pulses. Pulmonary:      Breath sounds: Normal breath sounds. Abdominal:      General: Bowel sounds are normal.      Palpations: Abdomen is soft. Musculoskeletal:         General: Normal range of motion. Cervical back: Normal range of motion and neck supple. Skin:     General: Skin is warm and dry. Capillary Refill: Capillary refill takes less than 2 seconds. Neurological:      General: No focal deficit present. Mental Status: He is alert and oriented to person, place, and time. Psychiatric:         Mood and Affect: Mood normal.         Behavior: Behavior normal.         Thought Content: Thought content normal.         ASSESSMENT/PLAN:  Rosalino Mbcride was seen today for shoulder pain and medication problem. Diagnoses and all orders for this visit:    Vitamin D deficiency  -     Vitamin D 25 Hydroxy; Future    Acute pain of left shoulder    Dizziness  -     IRON AND TIBC; Future  -     FERRITIN; Future       xrays reviewed, within normal limits. We will refer to PT In the future if needed     Educational materials and/or home exercises printed for patient's review and were included in patient instructions on his/her After Visit Summary and given to patient at the end of visit. Counseled regarding above diagnosis, including possible risks and complications,  especially if left uncontrolled. Counseled regarding the possible side effects, risks, benefits and alternatives to treatment; patient and/or guardian verbalizes understanding, agrees, feels comfortable with and wishes to proceed with above treatment plan.      Advised patient to call with any new medication issues, and read all Rx info from pharmacy to assure aware of all possible risks and side effects of medication before taking. Reviewed age and gender appropriate health screening exams and vaccinations. Advised patient regarding importance of keeping up with recommended health maintenance and to schedule as soon as possible if overdue, as this is important in assessing for undiagnosed pathology, especially cancer, as well as protecting against potentially harmful/life threatening disease. Patient and/or guardian verbalizes understanding and agrees with above counseling, assessment and plan. All questions answered. Return in about 4 weeks (around 1/11/2022) for annual.    An  electronic signature was used to authenticate this note.     --Magnus Smith,  on 12/14/2021 at 10:38 AM

## 2021-12-14 NOTE — LETTER
[facility] Primary Care  [address]  [place] [address]  Phone: [phone]  Fax: [phone]    Nestor Washburn DO        December 14, 2021     Patient: Stephanie Moser   YOB: 1995   Date of Visit: 12/14/2021       To Whom it May Concern:    Stephanie Moser was seen in my clinic on 12/14/2021. He may return to work on 12/15/21. He may return to work with no restrictions     If you have any questions or concerns, please don't hesitate to call.     Sincerely,         Michelle Noel DO

## 2021-12-21 ENCOUNTER — OFFICE VISIT (OUTPATIENT)
Dept: CHIROPRACTIC MEDICINE | Age: 26
End: 2021-12-21
Payer: COMMERCIAL

## 2021-12-21 VITALS
OXYGEN SATURATION: 98 % | HEART RATE: 78 BPM | BODY MASS INDEX: 21.99 KG/M2 | TEMPERATURE: 97.3 F | HEIGHT: 60 IN | WEIGHT: 112 LBS

## 2021-12-21 DIAGNOSIS — M54.50 CHRONIC BILATERAL LOW BACK PAIN WITHOUT SCIATICA: ICD-10-CM

## 2021-12-21 DIAGNOSIS — G89.29 CHRONIC BILATERAL THORACIC BACK PAIN: ICD-10-CM

## 2021-12-21 DIAGNOSIS — M54.2 CERVICALGIA: ICD-10-CM

## 2021-12-21 DIAGNOSIS — M54.6 CHRONIC BILATERAL THORACIC BACK PAIN: ICD-10-CM

## 2021-12-21 DIAGNOSIS — G89.29 CHRONIC BILATERAL LOW BACK PAIN WITHOUT SCIATICA: ICD-10-CM

## 2021-12-21 DIAGNOSIS — M99.05 SEGMENTAL AND SOMATIC DYSFUNCTION OF PELVIC REGION: ICD-10-CM

## 2021-12-21 DIAGNOSIS — M99.01 SEGMENTAL AND SOMATIC DYSFUNCTION OF CERVICAL REGION: ICD-10-CM

## 2021-12-21 DIAGNOSIS — M99.03 SEGMENTAL AND SOMATIC DYSFUNCTION OF LUMBAR REGION: Primary | ICD-10-CM

## 2021-12-21 DIAGNOSIS — M99.02 SEGMENTAL AND SOMATIC DYSFUNCTION OF THORACIC REGION: ICD-10-CM

## 2021-12-21 DIAGNOSIS — M53.3 SACROCOCCYGEAL DISORDERS, NOT ELSEWHERE CLASSIFIED: ICD-10-CM

## 2021-12-21 PROCEDURE — 98941 CHIROPRACT MANJ 3-4 REGIONS: CPT | Performed by: CHIROPRACTOR

## 2021-12-21 NOTE — PROGRESS NOTES
21  Particia Duty : 1995 Sex: adult  Age: 23 y.o. Chief Complaint   Patient presents with    Back Pain    Neck Pain       HPI:   Pain is has worsened slightly. On average, pain is perceived as moderate (4-6 pain scale). He is reporting stiffness and soreness throughout his neck and back today. No new issues overall. Continues with PT for bilateral knee. Shoulder is better. Current Outpatient Medications:     vitamin D (ERGOCALCIFEROL) 1.25 MG (87898 UT) CAPS capsule, TAKE 1 CAPSULE BY MOUTH ONCE A WEEK, Disp: 4 capsule, Rfl: 3    primidone (MYSOLINE) 50 MG tablet, Take 1 tablet by mouth nightly, Disp: 30 tablet, Rfl: 3    PARoxetine (PAXIL) 10 MG tablet, Take 1 tablet by mouth daily, Disp: 30 tablet, Rfl: 3    amphetamine-dextroamphetamine (ADDERALL, 20MG,) 20 MG tablet, Take 20 mg by mouth daily. , Disp: , Rfl:     triamcinolone (KENALOG) 0.1 % cream, Apply topically 2 times daily. , Disp: 80 g, Rfl: 3    chlorhexidine (PERIDEX) 0.12 % solution, TAKE 15 ML BY MOUTH TWICE DAILY AFTER MEALS *SWISH IN MOUTH FOR 30 SECONDS THEN SPIT OUT*, Disp: , Rfl:     fluticasone (FLONASE) 50 MCG/ACT nasal spray, 2 sprays by Each Nostril route daily, Disp: 3 Bottle, Rfl: 1    buPROPion (WELLBUTRIN XL) 150 MG extended release tablet, Take 150 mg by mouth every morning, Disp: , Rfl:     naproxen (NAPROSYN) 500 MG tablet, Take 1 tablet by mouth 2 times daily as needed for Pain, Disp: 14 tablet, Rfl: 0    testosterone cypionate (DEPOTESTOTERONE CYPIONATE) 200 MG/ML injection, Inject 50 mg into the muscle once a week., Disp: , Rfl:     Exam:   Vitals:    21 1126   Pulse: 78   Temp: 97.3 °F (36.3 °C)   SpO2: 98%       No midline pain  There are hypertonic and tender fibers noted today in the cervical, thoracic and lumbar paraspinal muscles. Joint fixation is noted with motion screening at C5-6, T3-6, T12-L1, L5-S1 and bilateral SI joints.     Trae Cecillemadison was seen today for back pain and neck

## 2022-01-05 ENCOUNTER — OFFICE VISIT (OUTPATIENT)
Dept: CHIROPRACTIC MEDICINE | Age: 27
End: 2022-01-05
Payer: COMMERCIAL

## 2022-01-05 VITALS — OXYGEN SATURATION: 98 % | HEART RATE: 92 BPM | TEMPERATURE: 97.9 F

## 2022-01-05 DIAGNOSIS — M99.01 SEGMENTAL AND SOMATIC DYSFUNCTION OF CERVICAL REGION: ICD-10-CM

## 2022-01-05 DIAGNOSIS — G89.29 CHRONIC BILATERAL THORACIC BACK PAIN: ICD-10-CM

## 2022-01-05 DIAGNOSIS — M54.2 CERVICALGIA: ICD-10-CM

## 2022-01-05 DIAGNOSIS — M99.05 SEGMENTAL AND SOMATIC DYSFUNCTION OF PELVIC REGION: ICD-10-CM

## 2022-01-05 DIAGNOSIS — M53.3 SACROCOCCYGEAL DISORDERS, NOT ELSEWHERE CLASSIFIED: ICD-10-CM

## 2022-01-05 DIAGNOSIS — M54.50 CHRONIC BILATERAL LOW BACK PAIN WITHOUT SCIATICA: ICD-10-CM

## 2022-01-05 DIAGNOSIS — M99.02 SEGMENTAL AND SOMATIC DYSFUNCTION OF THORACIC REGION: ICD-10-CM

## 2022-01-05 DIAGNOSIS — M99.03 SEGMENTAL AND SOMATIC DYSFUNCTION OF LUMBAR REGION: Primary | ICD-10-CM

## 2022-01-05 DIAGNOSIS — M54.6 CHRONIC BILATERAL THORACIC BACK PAIN: ICD-10-CM

## 2022-01-05 DIAGNOSIS — G89.29 CHRONIC BILATERAL LOW BACK PAIN WITHOUT SCIATICA: ICD-10-CM

## 2022-01-05 DIAGNOSIS — M62.830 MUSCLE SPASM OF BACK: ICD-10-CM

## 2022-01-05 PROCEDURE — 99999 PR OFFICE/OUTPT VISIT,PROCEDURE ONLY: CPT | Performed by: CHIROPRACTOR

## 2022-01-05 PROCEDURE — 98941 CHIROPRACT MANJ 3-4 REGIONS: CPT | Performed by: CHIROPRACTOR

## 2022-01-05 NOTE — PROGRESS NOTES
22  Dietra Service : 1995 Sex: adult  Age: 23 y.o. Chief Complaint   Patient presents with    Back Pain    Neck Pain       HPI:   Sada Beckford returns today for care. Did well with treatment last time, but that was few weeks ago. No new issues. difficulty sleeping, getting uncomfortable at times due to back pain. Continues with physical therapy for the knees. Current Outpatient Medications:     naproxen (NAPROSYN) 500 MG tablet, Take 1 tablet by mouth 2 times daily as needed for Pain, Disp: 14 tablet, Rfl: 0    vitamin D (ERGOCALCIFEROL) 1.25 MG (09360 UT) CAPS capsule, TAKE 1 CAPSULE BY MOUTH ONCE A WEEK, Disp: 4 capsule, Rfl: 3    primidone (MYSOLINE) 50 MG tablet, Take 1 tablet by mouth nightly, Disp: 30 tablet, Rfl: 3    PARoxetine (PAXIL) 10 MG tablet, Take 1 tablet by mouth daily, Disp: 30 tablet, Rfl: 3    amphetamine-dextroamphetamine (ADDERALL, 20MG,) 20 MG tablet, Take 20 mg by mouth daily. , Disp: , Rfl:     triamcinolone (KENALOG) 0.1 % cream, Apply topically 2 times daily. , Disp: 80 g, Rfl: 3    testosterone cypionate (DEPOTESTOTERONE CYPIONATE) 200 MG/ML injection, Inject 50 mg into the muscle once a week., Disp: , Rfl:     chlorhexidine (PERIDEX) 0.12 % solution, TAKE 15 ML BY MOUTH TWICE DAILY AFTER MEALS *SWISH IN MOUTH FOR 30 SECONDS THEN SPIT OUT*, Disp: , Rfl:     fluticasone (FLONASE) 50 MCG/ACT nasal spray, 2 sprays by Each Nostril route daily, Disp: 3 Bottle, Rfl: 1    buPROPion (WELLBUTRIN XL) 150 MG extended release tablet, Take 150 mg by mouth every morning, Disp: , Rfl:     Exam:   Vitals:    22 1122   Pulse: 92   Temp: 97.9 °F (36.6 °C)   SpO2: 98%       Trigger points in the bilateral suboccipital group. No midline pain. Mild loss of cervical protraction and retraction. There are hypertonic and tender fibers noted today in the cervical, thoracic and lumbar paraspinal muscles.  Joint fixation is noted with motion screening at C2-3, T4-6, T12-L1, L4-S1 and bilateral SI joints. Lynette Mccormack was seen today for back pain and neck pain. Diagnoses and all orders for this visit:    Segmental and somatic dysfunction of lumbar region    Segmental and somatic dysfunction of thoracic region    Segmental and somatic dysfunction of pelvic region    Segmental and somatic dysfunction of cervical region    Chronic bilateral thoracic back pain    Chronic bilateral low back pain without sciatica    Cervicalgia    Sacrococcygeal disorders, not elsewhere classified    Muscle spasm of back        Treatment Plan: Mechanically assisted manipulation to C2-3. Diversified manipulation to the listed thoracic, lumbar and pelvic segments. Tolerated well. I will see him back in approximately 2 weeks for continued care.       Seen By:  Solitario Gutierrez DC

## 2022-01-10 ENCOUNTER — NURSE ONLY (OUTPATIENT)
Dept: PRIMARY CARE CLINIC | Age: 27
End: 2022-01-10

## 2022-01-10 DIAGNOSIS — Z20.822 EXPOSURE TO COVID-19 VIRUS: Primary | ICD-10-CM

## 2022-01-10 PROCEDURE — 99999 PR OFFICE/OUTPT VISIT,PROCEDURE ONLY: CPT | Performed by: NURSE PRACTITIONER

## 2022-01-11 ENCOUNTER — OFFICE VISIT (OUTPATIENT)
Dept: FAMILY MEDICINE CLINIC | Age: 27
End: 2022-01-11
Payer: COMMERCIAL

## 2022-01-11 VITALS
SYSTOLIC BLOOD PRESSURE: 98 MMHG | OXYGEN SATURATION: 99 % | TEMPERATURE: 97.7 F | WEIGHT: 110 LBS | BODY MASS INDEX: 21.6 KG/M2 | DIASTOLIC BLOOD PRESSURE: 64 MMHG | HEART RATE: 77 BPM | HEIGHT: 60 IN | RESPIRATION RATE: 16 BRPM

## 2022-01-11 DIAGNOSIS — M25.561 CHRONIC PAIN OF BOTH KNEES: ICD-10-CM

## 2022-01-11 DIAGNOSIS — M25.562 CHRONIC PAIN OF BOTH KNEES: ICD-10-CM

## 2022-01-11 DIAGNOSIS — G89.29 CHRONIC PAIN OF BOTH KNEES: ICD-10-CM

## 2022-01-11 DIAGNOSIS — R25.1 INTERMITTENT TREMOR: Primary | ICD-10-CM

## 2022-01-11 PROCEDURE — G8420 CALC BMI NORM PARAMETERS: HCPCS | Performed by: STUDENT IN AN ORGANIZED HEALTH CARE EDUCATION/TRAINING PROGRAM

## 2022-01-11 PROCEDURE — G8482 FLU IMMUNIZE ORDER/ADMIN: HCPCS | Performed by: STUDENT IN AN ORGANIZED HEALTH CARE EDUCATION/TRAINING PROGRAM

## 2022-01-11 PROCEDURE — 1036F TOBACCO NON-USER: CPT | Performed by: STUDENT IN AN ORGANIZED HEALTH CARE EDUCATION/TRAINING PROGRAM

## 2022-01-11 PROCEDURE — G8427 DOCREV CUR MEDS BY ELIG CLIN: HCPCS | Performed by: STUDENT IN AN ORGANIZED HEALTH CARE EDUCATION/TRAINING PROGRAM

## 2022-01-11 PROCEDURE — 99213 OFFICE O/P EST LOW 20 MIN: CPT | Performed by: STUDENT IN AN ORGANIZED HEALTH CARE EDUCATION/TRAINING PROGRAM

## 2022-01-11 RX ORDER — ESOMEPRAZOLE MAGNESIUM 40 MG/1
CAPSULE, DELAYED RELEASE ORAL
COMMUNITY
Start: 2022-01-02 | End: 2022-02-22 | Stop reason: SDUPTHER

## 2022-01-11 ASSESSMENT — ENCOUNTER SYMPTOMS
SORE THROAT: 0
DIARRHEA: 0
SHORTNESS OF BREATH: 0
NAUSEA: 0
VOMITING: 0
BACK PAIN: 1
RHINORRHEA: 0
SINUS PRESSURE: 1
COUGH: 0
EYE PAIN: 0
SINUS PAIN: 1
ABDOMINAL PAIN: 0
CONSTIPATION: 0
CHEST TIGHTNESS: 0

## 2022-01-11 ASSESSMENT — PATIENT HEALTH QUESTIONNAIRE - PHQ9
SUM OF ALL RESPONSES TO PHQ QUESTIONS 1-9: 0
1. LITTLE INTEREST OR PLEASURE IN DOING THINGS: 0
4. FEELING TIRED OR HAVING LITTLE ENERGY: 0
SUM OF ALL RESPONSES TO PHQ QUESTIONS 1-9: 0
8. MOVING OR SPEAKING SO SLOWLY THAT OTHER PEOPLE COULD HAVE NOTICED. OR THE OPPOSITE, BEING SO FIGETY OR RESTLESS THAT YOU HAVE BEEN MOVING AROUND A LOT MORE THAN USUAL: 0
6. FEELING BAD ABOUT YOURSELF - OR THAT YOU ARE A FAILURE OR HAVE LET YOURSELF OR YOUR FAMILY DOWN: 0
5. POOR APPETITE OR OVEREATING: 0
SUM OF ALL RESPONSES TO PHQ QUESTIONS 1-9: 0
SUM OF ALL RESPONSES TO PHQ QUESTIONS 1-9: 0
2. FEELING DOWN, DEPRESSED OR HOPELESS: 0
7. TROUBLE CONCENTRATING ON THINGS, SUCH AS READING THE NEWSPAPER OR WATCHING TELEVISION: 0
9. THOUGHTS THAT YOU WOULD BE BETTER OFF DEAD, OR OF HURTING YOURSELF: 0
SUM OF ALL RESPONSES TO PHQ9 QUESTIONS 1 & 2: 0
3. TROUBLE FALLING OR STAYING ASLEEP: 0
10. IF YOU CHECKED OFF ANY PROBLEMS, HOW DIFFICULT HAVE THESE PROBLEMS MADE IT FOR YOU TO DO YOUR WORK, TAKE CARE OF THINGS AT HOME, OR GET ALONG WITH OTHER PEOPLE: 0

## 2022-01-11 NOTE — PROGRESS NOTES
2022    Vega Herbert (:  1995) is a 23 y.o. adult, here for evaluation of the following medical concerns:    HPI  Chief Complaint   Patient presents with   [address] friend tested positive yesterday  for Covid and is concerned re: exposure / vaccinated / no symptoms / had ovid testing at [facility] urgent care     Patient is a 60-year-old female to male transgender here for follow-up. Patient wanted to discuss his labs. Discussed everything was within normal limits other than elevated iron. If he was taking anything he needed to stop it. Discussed that the multivitamin could have iron in it we will repeat the iron in the future. If still elevated will discuss we need to do at that time. Patient's girlfriend was tested for COVID but was negative. However patient's girlfriend was around somebody that was positive for COVID. Patient is currently not having any symptoms other than fatigue. He does have some sinus pain and pressure but all the time. Patient does always have knee back and neck pain. Review of Systems   Constitutional: Positive for fatigue. Negative for chills and fever. HENT: Positive for sinus pressure and sinus pain. Negative for congestion, ear pain, postnasal drip, rhinorrhea and sore throat. Eyes: Negative for pain. Respiratory: Negative for cough, chest tightness and shortness of breath. Cardiovascular: Negative for chest pain. Gastrointestinal: Negative for abdominal pain, constipation, diarrhea, nausea and vomiting. Genitourinary: Negative for difficulty urinating, dysuria and frequency. Musculoskeletal: Positive for arthralgias, back pain and neck pain. Negative for myalgias. Skin: Negative for rash. Neurological: Positive for headaches. Negative for dizziness, light-headedness and numbness. Prior to Visit Medications    Medication Sig Taking?  Authorizing Provider   methylphenidate (RITALIN) 10 MG tablet TAKE 1 TABLET BY MOUTH EVERY DAY IN THE MORNING Yes Historical Provider, MD   esomeprazole (NEXIUM) 40 MG delayed release capsule TAKE 1 CAPSULE BY MOUTH ONCE A DAY Yes Historical Provider, MD   vitamin D (ERGOCALCIFEROL) 1.25 MG (65341 UT) CAPS capsule TAKE 1 CAPSULE BY MOUTH ONCE A WEEK Yes Michelle Noel DO   primidone (MYSOLINE) 50 MG tablet Take 1 tablet by mouth nightly Yes Michelle Noel DO   PARoxetine (PAXIL) 10 MG tablet Take 1 tablet by mouth daily Yes Michelle Noel DO   triamcinolone (KENALOG) 0.1 % cream Apply topically 2 times daily. Yes Michelle Noel DO   testosterone cypionate (DEPOTESTOTERONE CYPIONATE) 200 MG/ML injection Inject 50 mg into the muscle once a week. Yes Historical Provider, MD   chlorhexidine (PERIDEX) 0.12 % solution TAKE 15 ML BY MOUTH TWICE DAILY AFTER MEALS *SWISH IN MOUTH FOR 30 SECONDS THEN SPIT OUT* Yes Historical Provider, MD   fluticasone (FLONASE) 50 MCG/ACT nasal spray 2 sprays by Each Nostril route daily Yes Michelle Noel DO   buPROPion (WELLBUTRIN XL) 150 MG extended release tablet Take 150 mg by mouth every morning Yes Historical Provider, MD   naproxen (NAPROSYN) 500 MG tablet Take 1 tablet by mouth 2 times daily as needed for Pain  Jalen Borjas PA-C   amphetamine-dextroamphetamine (ADDERALL, 20MG,) 20 MG tablet Take 20 mg by mouth daily. Patient not taking: Reported on 1/11/2022  Historical Provider, MD        No Known Allergies    Past Medical History:   Diagnosis Date    Acid reflux     Anxiety     Depression     Excessive physiologic tremor 3/29/2021    Gender dysphoria in adult 3/29/2021    Sleep disturbance 3/29/2021    Tremor 3/29/2021    Vitamin D deficiency        History reviewed. No pertinent surgical history.     Social History     Socioeconomic History    Marital status: Single     Spouse name: Not on file    Number of children: Not on file    Years of education: Not on file    Highest education level: Not on file   Occupational History    Not on file   Tobacco Use    Smoking status: Never Smoker    Smokeless tobacco: Never Used   Substance and Sexual Activity    Alcohol use: Never    Drug use: Not on file     Comment: last used in September 2020    Sexual activity: Yes   Other Topics Concern    Not on file   Social History Narrative    Not on file     Social Determinants of Health     Financial Resource Strain: Medium Risk    Difficulty of Paying Living Expenses: Somewhat hard   Food Insecurity: No Food Insecurity    Worried About Running Out of Food in the Last Year: Never true    Jackelyn of Food in the Last Year: Never true   Transportation Needs:     Lack of Transportation (Medical): Not on file    Lack of Transportation (Non-Medical):  Not on file   Physical Activity:     Days of Exercise per Week: Not on file    Minutes of Exercise per Session: Not on file   Stress:     Feeling of Stress : Not on file   Social Connections:     Frequency of Communication with Friends and Family: Not on file    Frequency of Social Gatherings with Friends and Family: Not on file    Attends Adventism Services: Not on file    Active Member of [address] [place] [address] or Organizations: Not on file    Attends Club or Organization Meetings: Not on file    Marital Status: Not on file   Intimate Partner Violence:     Fear of Current or Ex-Partner: Not on file    Emotionally Abused: Not on file    Physically Abused: Not on file    Sexually Abused: Not on file   Housing Stability:     Unable to Pay for Housing in the Last Year: Not on file    Number of [place] in the Last Year: Not on file    Unstable Housing in the Last Year: Not on file        Family History   Problem Relation Age of Onset    Hypothyroidism Mother     High Blood Pressure Father     Depression Maternal Uncle     Asthma Paternal Aunt     High Blood Pressure Maternal Grandmother     Hypothyroidism Maternal Grandmother     Cancer Maternal Grandfather     Stroke Paternal Grandmother     Heart Attack Paternal Grandfather     Irritable Bowel Syndrome Sister            Vitals:    01/11/22 1149   BP: 98/64   Pulse: 77   Resp: 16   Temp: 97.7 °F (36.5 °C)   TempSrc: Temporal   SpO2: 99%   Weight: 110 lb (49.9 kg)   Height: 5' (1.524 m)     Estimated body mass index is 21.48 kg/m² as calculated from the following:    Height as of this encounter: 5' (1.524 m). Weight as of this encounter: 110 lb (49.9 kg). Most Recent Labs  CBC  Lab Results   Component Value Date    WBC 6.5 12/14/2021    WBC 8.6 03/02/2021    RBC 4.99 12/14/2021    RBC 4.57 03/02/2021    HGB 14.8 12/14/2021    HGB 13.8 03/02/2021    HCT 46.4 12/14/2021    HCT 43.1 03/02/2021    MCV 93.0 12/14/2021    MCV 94.3 03/02/2021     12/14/2021     03/02/2021      CMP  Lab Results   Component Value Date     03/02/2021    K 4.7 03/02/2021     03/02/2021    CO2 24 03/02/2021    ANIONGAP 10 03/02/2021    GLUCOSE 86 03/02/2021    BUN 9 03/02/2021    CREATININE 0.8 03/02/2021    LABGLOM >60 03/02/2021    GFRAA >60 03/02/2021    CALCIUM 9.1 03/02/2021    PROT 7.3 03/02/2021    LABALBU 4.5 03/02/2021    BILITOT 0.3 03/02/2021    ALKPHOS 50 03/02/2021    AST 16 03/02/2021    ALT 20 03/02/2021     TSH  Lab Results   Component Value Date    TSH 1.990 03/02/2021     LIPID  Lab Results   Component Value Date    CHOL 134 03/16/2021    HDL 44 03/16/2021    LDLCALC 82 03/16/2021    TRIG 38 03/16/2021     VITAMIN D  Lab Results   Component Value Date    VITD25 52 12/14/2021    VITD25 38 02/15/2021       Physical Exam  Vitals and nursing note reviewed. Constitutional:       Appearance: Normal appearance. HENT:      Head: Normocephalic and atraumatic.       Right Ear: Tympanic membrane, ear canal and external ear normal.      Left Ear: Tympanic membrane, ear canal and external ear normal.      Nose: Nose normal.      Mouth/Throat:      Mouth: Mucous membranes are moist.   Eyes:      Extraocular Movements: Extraocular movements intact. Pupils: Pupils are equal, round, and reactive to light. Cardiovascular:      Rate and Rhythm: Normal rate and regular rhythm. Pulses: Normal pulses. Pulmonary:      Breath sounds: Normal breath sounds. Abdominal:      General: Bowel sounds are normal.      Palpations: Abdomen is soft. Musculoskeletal:         General: Normal range of motion. Cervical back: Normal range of motion and neck supple. Skin:     General: Skin is warm and dry. Capillary Refill: Capillary refill takes less than 2 seconds. Neurological:      General: No focal deficit present. Mental Status: He is alert and oriented to person, place, and time. Psychiatric:         Mood and Affect: Mood normal.         Behavior: Behavior normal.         Thought Content: Thought content normal.         ASSESSMENT/PLAN:  Katie Mendoza was seen today for discuss labs and other. Diagnoses and all orders for this visit:    Intermittent tremor    Chronic pain of both knees         As above. Call or go to the nearest ED immediately if symptoms worsen or persist.  Educational materials and/or home exercises printed for patient's review and were included in patient instructions on his/her After Visit Summary and given to patient at the end of visit. Counseled regarding above diagnosis, including possible risks and complications,  especially if left uncontrolled. Counseled regarding the possible side effects, risks, benefits and alternatives to treatment; patient and/or guardian verbalizes understanding, agrees, feels comfortable with and wishes to proceed with above treatment plan. Advised patient to call with any new medication issues, and read all Rx info from pharmacy to assure aware of all possible risks and side effects of medication before taking. Reviewed age and gender appropriate health screening exams and vaccinations.   Advised patient regarding importance of keeping up with recommended health maintenance and to schedule as soon as possible if overdue, as this is important in assessing for undiagnosed pathology, especially cancer, as well as protecting against potentially harmful/life threatening disease. Patient and/or guardian verbalizes understanding and agrees with above counseling, assessment and plan. All questions answered. Return in about 6 weeks (around 2/22/2022) for physical.    An  electronic signature was used to authenticate this note.     --Bella Gomez,  on 1/11/2022 at 2:37 PM

## 2022-01-18 ENCOUNTER — CLINICAL DOCUMENTATION (OUTPATIENT)
Dept: NUTRITION | Age: 27
End: 2022-01-18

## 2022-01-18 NOTE — PROGRESS NOTES
Initial Nutrition Assessment Note    Date: 1/18/2022  Patient Name: Vaughn Hernandez [facility] by Kishore Treadwell)   Referring Clinician: TASHA Vick   Fax: [phone]  Reason for Visit: Constipation, nausea, IBS    Notes:  Stated has had GERD x7 yrs & worsened in 2020 for 6 months. Since then, hasn't returned to normal.   The GI Dr he was seeing dx him w/ IBS-C    Current Typical Eating Pattern:  x1 month: Stated eating habits have changed since becoming unemployed   Breakfast: Stated usually doesn't eat breakfast d/t wakes up around 11am  Lunch: Pancake sausage (3 of these) w/ apple juice   Snack: Girl  cookies & honey roasted peanuts   Dinner: Chicken strips & fries   Snack: Goldfish crackers  Apple juice/d: 16oz/d    Gatorade/d: 20oz/d   Water/d: Pt stated none- stated \"I don't like water. My stomach feels heavy\"     Stated is trying to get EBT card to be able to buy fruits/vegetables     He stated he doesn't have money to buy groceries as often as he would like but orders out food often. Past Nutrition Hx:  x6 months ago: Stated she was working at this time   Breakfast: McDonalds or [employer] - pancakes or Georgian toast sticks + smoothie   Snack: Peanuts or chips   Lunch: Auntie Anns pretzels with cheese dips or hot dog pretzels   Dinner: Spaghetti and meatballs w/ rufus sauce & cheese   Snack: Goldfish crackers   Apple juice/d: 16oz/d    Gatorade/d: 20oz/d     Allergies and Food Sensitives:   Tomatoes, tomato sauce l/t acid reflux     Dietary Limitations; Time/Preparation Issues:  He said she lives w/ 5 other people; he stated he and her roommates also are not able to get groceries as often. He said they grocery shop about 1x/month and try to stock up on frozen meals, canned foods etc.   He applied for unemployment and snap benefits     Sleep patterns:   ~6-8hrs/night. He stated he doesn't typically feel rested.      Stress/Environment Issues that may affect PO intake:  Not always having access to food/food available. Work:  ? Unemployed     [facility] History:  He stated his weight has fluctuated over the past year. 2019: 170#  2020: 96# Stated he lost ~70# when GERD got bad   CBW: 111# (Stated he would like to gain some weight & build more muscle)     Family Support:  ? 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 Height: 60 inches    CBW: 111 lbs  BMI: 21       Past Medical History:   Diagnosis Date    Acid reflux     Anxiety     Depression     Excessive physiologic tremor 3/29/2021    Gender dysphoria in adult 3/29/2021    Sleep disturbance 3/29/2021    Tremor 3/29/2021    Vitamin D deficiency    Hx continued: emetophobia    No past surgical history on file. Family History   Problem Relation Age of Onset    Hypothyroidism Mother     High Blood Pressure Father     Depression Maternal Uncle     Asthma Paternal Aunt     High Blood Pressure Maternal Grandmother     Hypothyroidism Maternal Grandmother     Cancer Maternal Grandfather     Stroke Paternal Grandmother     Heart Attack Paternal Grandfather     Irritable Bowel Syndrome Sister       Medications:  Prior to Admission medications    Medication Sig Start Date End Date Taking?  Authorizing Provider   methylphenidate (RITALIN) 10 MG tablet TAKE 1 TABLET BY MOUTH EVERY DAY IN THE MORNING 1/4/22   Historical Provider, MD   esomeprazole (NEXIUM) 40 MG delayed release capsule TAKE 1 CAPSULE BY MOUTH ONCE A DAY 1/2/22   Historical Provider, MD   naproxen (NAPROSYN) 500 MG tablet Take 1 tablet by mouth 2 times daily as needed for Pain 12/3/21 12/10/21  Juan Johnson PA-C   vitamin D (ERGOCALCIFEROL) 1.25 MG (61151 UT) CAPS capsule TAKE 1 CAPSULE BY MOUTH ONCE A WEEK 11/30/21   Michelle Noel DO primidone (MYSOLINE) 50 MG tablet Take 1 tablet by mouth nightly 11/23/21   Michelle Noel DO   PARoxetine (PAXIL) 10 MG tablet Take 1 tablet by mouth daily 11/23/21   Michelle Noel DO   triamcinolone (KENALOG) 0.1 % cream Apply topically 2 times daily. 8/30/21   Michelle Noel DO   testosterone cypionate (DEPOTESTOTERONE CYPIONATE) 200 MG/ML injection Inject 50 mg into the muscle once a week. 6/23/21 1/11/22  Historical Provider, MD   chlorhexidine (PERIDEX) 0.12 % solution TAKE 15 ML BY MOUTH TWICE DAILY AFTER MEALS *SWISH IN MOUTH FOR 30 SECONDS THEN SPIT OUT* 7/9/21   Historical Provider, MD   fluticasone (FLONASE) 50 MCG/ACT nasal spray 2 sprays by Each Nostril route daily 4/19/21   Michelle Noel DO   buPROPion (WELLBUTRIN XL) 150 MG extended release tablet Take 150 mg by mouth every morning    Historical Provider, MD     Supplements, OTC:  Vitamin D as listed above     Labs:  Lab Results   Component Value Date     03/02/2021    K 4.7 03/02/2021     03/02/2021    CO2 24 03/02/2021    BUN 9 03/02/2021    CREATININE 0.8 03/02/2021    GLUCOSE 86 03/02/2021    CALCIUM 9.1 03/02/2021    PROT 7.3 03/02/2021    LABALBU 4.5 03/02/2021    BILITOT 0.3 03/02/2021    ALKPHOS 50 03/02/2021    AST 16 03/02/2021    ALT 20 03/02/2021    LABGLOM >60 03/02/2021    GFRAA >60 03/02/2021     Lab Results   Component Value Date    CHOL 134 03/16/2021     Lab Results   Component Value Date    TRIG 38 03/16/2021     Lab Results   Component Value Date    HDL 44 03/16/2021     Lab Results   Component Value Date    LDLCALC 82 03/16/2021     Lab Results   Component Value Date    LABVLDL 8 03/16/2021     No results found for: CHOLHDLRATIO    Plan:  IBS MNT - fiber   Balancing meals   Fluid intake     Motivational Scale:  6/10    Motivators for lifestyle change:  1. Overall health     Barriers:  1.  Food access & types     Follow-up plan:  Feb 7th at 1:00pm     Electronically signed by: Jeff Daniels RD, LD on 1/18/22 at 1:56 PM EST

## 2022-01-19 ENCOUNTER — OFFICE VISIT (OUTPATIENT)
Dept: CHIROPRACTIC MEDICINE | Age: 27
End: 2022-01-19
Payer: COMMERCIAL

## 2022-01-19 VITALS
BODY MASS INDEX: 21.6 KG/M2 | HEART RATE: 78 BPM | HEIGHT: 60 IN | WEIGHT: 110 LBS | OXYGEN SATURATION: 99 % | TEMPERATURE: 96.3 F

## 2022-01-19 DIAGNOSIS — M99.02 SEGMENTAL AND SOMATIC DYSFUNCTION OF THORACIC REGION: ICD-10-CM

## 2022-01-19 DIAGNOSIS — G89.29 CHRONIC BILATERAL THORACIC BACK PAIN: ICD-10-CM

## 2022-01-19 DIAGNOSIS — M99.01 SEGMENTAL AND SOMATIC DYSFUNCTION OF CERVICAL REGION: ICD-10-CM

## 2022-01-19 DIAGNOSIS — M54.50 CHRONIC BILATERAL LOW BACK PAIN WITHOUT SCIATICA: ICD-10-CM

## 2022-01-19 DIAGNOSIS — M54.6 CHRONIC BILATERAL THORACIC BACK PAIN: ICD-10-CM

## 2022-01-19 DIAGNOSIS — M99.05 SEGMENTAL AND SOMATIC DYSFUNCTION OF PELVIC REGION: ICD-10-CM

## 2022-01-19 DIAGNOSIS — M99.03 SEGMENTAL AND SOMATIC DYSFUNCTION OF LUMBAR REGION: Primary | ICD-10-CM

## 2022-01-19 DIAGNOSIS — M53.3 SACROCOCCYGEAL DISORDERS, NOT ELSEWHERE CLASSIFIED: ICD-10-CM

## 2022-01-19 DIAGNOSIS — M54.2 CERVICALGIA: ICD-10-CM

## 2022-01-19 DIAGNOSIS — G89.29 CHRONIC BILATERAL LOW BACK PAIN WITHOUT SCIATICA: ICD-10-CM

## 2022-01-19 DIAGNOSIS — M62.830 MUSCLE SPASM OF BACK: ICD-10-CM

## 2022-01-19 PROCEDURE — 99999 PR OFFICE/OUTPT VISIT,PROCEDURE ONLY: CPT | Performed by: CHIROPRACTOR

## 2022-01-19 PROCEDURE — 98941 CHIROPRACT MANJ 3-4 REGIONS: CPT | Performed by: CHIROPRACTOR

## 2022-01-19 NOTE — PROGRESS NOTES
22  Cameron Begum : 1995 Sex: adult  Age: 23 y.o. Chief Complaint   Patient presents with    Lower Back Pain    Back Pain       HPI:   Pain is has worsened slightly. On average, pain is perceived as moderate (4-6 pain scale). Patient denies new numbness, new weakness, new tingling      Current Outpatient Medications:     methylphenidate (RITALIN) 10 MG tablet, TAKE 1 TABLET BY MOUTH EVERY DAY IN THE MORNING, Disp: , Rfl:     esomeprazole (NEXIUM) 40 MG delayed release capsule, TAKE 1 CAPSULE BY MOUTH ONCE A DAY, Disp: , Rfl:     vitamin D (ERGOCALCIFEROL) 1.25 MG (38506 UT) CAPS capsule, TAKE 1 CAPSULE BY MOUTH ONCE A WEEK, Disp: 4 capsule, Rfl: 3    primidone (MYSOLINE) 50 MG tablet, Take 1 tablet by mouth nightly, Disp: 30 tablet, Rfl: 3    PARoxetine (PAXIL) 10 MG tablet, Take 1 tablet by mouth daily, Disp: 30 tablet, Rfl: 3    triamcinolone (KENALOG) 0.1 % cream, Apply topically 2 times daily. , Disp: 80 g, Rfl: 3    chlorhexidine (PERIDEX) 0.12 % solution, TAKE 15 ML BY MOUTH TWICE DAILY AFTER MEALS *SWISH IN MOUTH FOR 30 SECONDS THEN SPIT OUT*, Disp: , Rfl:     fluticasone (FLONASE) 50 MCG/ACT nasal spray, 2 sprays by Each Nostril route daily, Disp: 3 Bottle, Rfl: 1    buPROPion (WELLBUTRIN XL) 150 MG extended release tablet, Take 150 mg by mouth every morning, Disp: , Rfl:     naproxen (NAPROSYN) 500 MG tablet, Take 1 tablet by mouth 2 times daily as needed for Pain, Disp: 14 tablet, Rfl: 0    testosterone cypionate (DEPOTESTOTERONE CYPIONATE) 200 MG/ML injection, Inject 50 mg into the muscle once a week., Disp: , Rfl:     Exam:   Vitals:    22 1138   Pulse: 78   Temp: 96.3 °F (35.7 °C)   SpO2: 99%       There are hypertonic and tender fibers noted today in the lumbar, thoracic and cervical paraspinal muscles.  Joint fixation is noted with motion screening at C6-T1, T4-7, T12-L1, L4-S1, right SI joint    Lynette Mccormack was seen today for lower back pain and back pain.    Diagnoses and all orders for this visit:    Segmental and somatic dysfunction of lumbar region    Segmental and somatic dysfunction of thoracic region    Segmental and somatic dysfunction of pelvic region    Segmental and somatic dysfunction of cervical region    Chronic bilateral thoracic back pain    Chronic bilateral low back pain without sciatica    Cervicalgia    Sacrococcygeal disorders, not elsewhere classified    Muscle spasm of back        Treatment Plan:      Mechanically assisted manipulation to C6-t1. Diversified manipulation to the listed thoracic, lumbar and pelvic segments. Tolerated well. I will see him back in approximately 2 weeks for continued care.     Seen By:  America Almonte DC

## 2022-02-02 ENCOUNTER — OFFICE VISIT (OUTPATIENT)
Dept: CHIROPRACTIC MEDICINE | Age: 27
End: 2022-02-02
Payer: COMMERCIAL

## 2022-02-02 VITALS — BODY MASS INDEX: 21.48 KG/M2 | OXYGEN SATURATION: 98 % | WEIGHT: 110 LBS | TEMPERATURE: 97.5 F | HEART RATE: 96 BPM

## 2022-02-02 DIAGNOSIS — M99.01 SEGMENTAL AND SOMATIC DYSFUNCTION OF CERVICAL REGION: ICD-10-CM

## 2022-02-02 DIAGNOSIS — M62.830 MUSCLE SPASM OF BACK: ICD-10-CM

## 2022-02-02 DIAGNOSIS — M53.3 SACROCOCCYGEAL DISORDERS, NOT ELSEWHERE CLASSIFIED: ICD-10-CM

## 2022-02-02 DIAGNOSIS — G89.29 CHRONIC BILATERAL THORACIC BACK PAIN: ICD-10-CM

## 2022-02-02 DIAGNOSIS — M99.05 SEGMENTAL AND SOMATIC DYSFUNCTION OF PELVIC REGION: ICD-10-CM

## 2022-02-02 DIAGNOSIS — M99.03 SEGMENTAL AND SOMATIC DYSFUNCTION OF LUMBAR REGION: Primary | ICD-10-CM

## 2022-02-02 DIAGNOSIS — M54.6 CHRONIC BILATERAL THORACIC BACK PAIN: ICD-10-CM

## 2022-02-02 DIAGNOSIS — M54.2 CERVICALGIA: ICD-10-CM

## 2022-02-02 DIAGNOSIS — G89.29 CHRONIC BILATERAL LOW BACK PAIN WITHOUT SCIATICA: ICD-10-CM

## 2022-02-02 DIAGNOSIS — M54.50 CHRONIC BILATERAL LOW BACK PAIN WITHOUT SCIATICA: ICD-10-CM

## 2022-02-02 DIAGNOSIS — M99.02 SEGMENTAL AND SOMATIC DYSFUNCTION OF THORACIC REGION: ICD-10-CM

## 2022-02-02 PROCEDURE — 98941 CHIROPRACT MANJ 3-4 REGIONS: CPT | Performed by: CHIROPRACTOR

## 2022-02-02 PROCEDURE — 99999 PR OFFICE/OUTPT VISIT,PROCEDURE ONLY: CPT | Performed by: CHIROPRACTOR

## 2022-02-02 NOTE — PROGRESS NOTES
22  Ayleen Quezada : 1995 Sex: adult  Age: 23 y.o. Chief Complaint   Patient presents with    Back Pain       HPI:   Pain has worsened slightly. Identifying pain today primarily in the thoracolumbar region, moderate degree in the cervicothoracic. On average, pain is perceived as moderate (4-6 pain scale). Patient denies new numbness, new weakness, new tingling      Current Outpatient Medications:     methylphenidate (RITALIN) 10 MG tablet, TAKE 1 TABLET BY MOUTH EVERY DAY IN THE MORNING, Disp: , Rfl:     esomeprazole (NEXIUM) 40 MG delayed release capsule, TAKE 1 CAPSULE BY MOUTH ONCE A DAY, Disp: , Rfl:     naproxen (NAPROSYN) 500 MG tablet, Take 1 tablet by mouth 2 times daily as needed for Pain, Disp: 14 tablet, Rfl: 0    vitamin D (ERGOCALCIFEROL) 1.25 MG (29843 UT) CAPS capsule, TAKE 1 CAPSULE BY MOUTH ONCE A WEEK, Disp: 4 capsule, Rfl: 3    primidone (MYSOLINE) 50 MG tablet, Take 1 tablet by mouth nightly, Disp: 30 tablet, Rfl: 3    PARoxetine (PAXIL) 10 MG tablet, Take 1 tablet by mouth daily, Disp: 30 tablet, Rfl: 3    triamcinolone (KENALOG) 0.1 % cream, Apply topically 2 times daily. , Disp: 80 g, Rfl: 3    testosterone cypionate (DEPOTESTOTERONE CYPIONATE) 200 MG/ML injection, Inject 50 mg into the muscle once a week., Disp: , Rfl:     chlorhexidine (PERIDEX) 0.12 % solution, TAKE 15 ML BY MOUTH TWICE DAILY AFTER MEALS *SWISH IN MOUTH FOR 30 SECONDS THEN SPIT OUT*, Disp: , Rfl:     fluticasone (FLONASE) 50 MCG/ACT nasal spray, 2 sprays by Each Nostril route daily, Disp: 3 Bottle, Rfl: 1    buPROPion (WELLBUTRIN XL) 150 MG extended release tablet, Take 150 mg by mouth every morning, Disp: , Rfl:     Exam:   Vitals:    22 1145   Pulse: 96   Temp: 97.5 °F (36.4 °C)   SpO2: 98%       There are hypertonic and tender fibers noted today in the cervical, thoracic and lumbar paraspinal muscles.  Joint fixation is noted with motion screening at C6-T1, T4-7, T10-L2, L5-S1, right SI joint. Katie Mendoza was seen today for back pain. Diagnoses and all orders for this visit:    Segmental and somatic dysfunction of lumbar region    Segmental and somatic dysfunction of thoracic region    Segmental and somatic dysfunction of pelvic region    Segmental and somatic dysfunction of cervical region    Chronic bilateral thoracic back pain    Chronic bilateral low back pain without sciatica    Cervicalgia    Sacrococcygeal disorders, not elsewhere classified    Muscle spasm of back        Treatment Plan: Mechanically assisted manipulation to C6-T1. Diversified manipulation to the remainder of segments listed in the thoracic, lumbar and SI region. Tolerated well, noting relief following care. Continue with home-based self-care.   I will see him back in approximately 2 weeks for continued care      Seen By:  Chele Wong

## 2022-02-07 ENCOUNTER — CLINICAL DOCUMENTATION (OUTPATIENT)
Dept: NUTRITION | Age: 27
End: 2022-02-07

## 2022-02-07 NOTE — PROGRESS NOTES
Nutrition Follow Up Note    Date: 2/7/2022  Patient: Adarsh Ch   Referring Clinician: TASHA Banks  Fax: [phone]  Patient's Last Session: 1/18/22  Reason for Visit:   IBS, constipation, nausea     Pt has started drinking water. Avg 25oz/d    Current eating pattern:   24 hr recall   Breakfast: Breaded chicken - home made w/ water to drink   Lunch: Breaded chicken   Dinner: McDonalds- Double cheeseburger   Snacks: Peanuts, chips, candy   Hot chocolate: 2c   Gatorade: 10oz/d   Water/d: 25oz/d     Current exercise routine:   None per pt     Last visit weight: 111#  CBW: 110#  Height: 60\"     Handouts given:   IBS MNT w/ fiber sources   GERD MNT     Updated plan:   Assess po intake for fiber, fruit/veg etc  Discuss balanced meals w/ examples   Label reading for SFA, NA & added sugars   Assess fluid intake   Assess GI sx     Motivation level over course of our last session: 6   Motivation level at the end of the session: 7     Smart Goals:   1.   Increase vegetable intake to 2 servings daily     Electronically signed by: Yg Anderson RD, LD on 2/7/22 at 12:42 PM EST

## 2022-02-12 ENCOUNTER — HOSPITAL ENCOUNTER (EMERGENCY)
Age: 27
Discharge: HOME | End: 2022-02-12
Payer: COMMERCIAL

## 2022-02-12 ENCOUNTER — APPOINTMENT (OUTPATIENT)
Dept: CT IMAGING | Age: 27
End: 2022-02-12
Payer: COMMERCIAL

## 2022-02-12 VITALS
HEART RATE: 78 BPM | HEIGHT: 60 IN | RESPIRATION RATE: 14 BRPM | WEIGHT: 110 LBS | OXYGEN SATURATION: 99 % | DIASTOLIC BLOOD PRESSURE: 76 MMHG | SYSTOLIC BLOOD PRESSURE: 118 MMHG | BODY MASS INDEX: 21.6 KG/M2 | TEMPERATURE: 98.3 F

## 2022-02-12 DIAGNOSIS — N30.01 ACUTE CYSTITIS WITH HEMATURIA: ICD-10-CM

## 2022-02-12 DIAGNOSIS — R10.9 FLANK PAIN: Primary | ICD-10-CM

## 2022-02-12 LAB
ALBUMIN SERPL-MCNC: 4.6 G/DL (ref 3.5–5.2)
ALP BLD-CCNC: 77 U/L (ref 35–104)
ALT SERPL-CCNC: 13 U/L (ref 0–32)
ANION GAP SERPL CALCULATED.3IONS-SCNC: 7 MMOL/L (ref 7–16)
AST SERPL-CCNC: 15 U/L (ref 0–31)
BACTERIA: ABNORMAL /HPF
BASOPHILS ABSOLUTE: 0.07 E9/L (ref 0–0.2)
BASOPHILS RELATIVE PERCENT: 0.4 % (ref 0–2)
BILIRUB SERPL-MCNC: 0.4 MG/DL (ref 0–1.2)
BILIRUBIN URINE: NEGATIVE
BLOOD, URINE: ABNORMAL
BUN BLDV-MCNC: 12 MG/DL (ref 6–20)
CALCIUM SERPL-MCNC: 10 MG/DL (ref 8.6–10.2)
CHLORIDE BLD-SCNC: 100 MMOL/L (ref 98–107)
CLARITY: ABNORMAL
CO2: 29 MMOL/L (ref 22–29)
COLOR: YELLOW
CREAT SERPL-MCNC: 0.8 MG/DL (ref 0.5–1)
EOSINOPHILS ABSOLUTE: 0.28 E9/L (ref 0.05–0.5)
EOSINOPHILS RELATIVE PERCENT: 1.7 % (ref 0–6)
EPITHELIAL CELLS, UA: ABNORMAL /HPF
GFR AFRICAN AMERICAN: >60
GFR NON-AFRICAN AMERICAN: >60 ML/MIN/1.73
GLUCOSE BLD-MCNC: 88 MG/DL (ref 74–99)
GLUCOSE URINE: NEGATIVE MG/DL
HCG, URINE, POC: NEGATIVE
HCT VFR BLD CALC: 45.5 % (ref 34–48)
HEMOGLOBIN: 14.8 G/DL (ref 11.5–15.5)
IMMATURE GRANULOCYTES #: 0.07 E9/L
IMMATURE GRANULOCYTES %: 0.4 % (ref 0–5)
KETONES, URINE: NEGATIVE MG/DL
LEUKOCYTE ESTERASE, URINE: ABNORMAL
LYMPHOCYTES ABSOLUTE: 3.28 E9/L (ref 1.5–4)
LYMPHOCYTES RELATIVE PERCENT: 20.1 % (ref 20–42)
Lab: NORMAL
MCH RBC QN AUTO: 30.6 PG (ref 26–35)
MCHC RBC AUTO-ENTMCNC: 32.5 % (ref 32–34.5)
MCV RBC AUTO: 94 FL (ref 80–99.9)
MONOCYTES ABSOLUTE: 0.93 E9/L (ref 0.1–0.95)
MONOCYTES RELATIVE PERCENT: 5.7 % (ref 2–12)
NEGATIVE QC PASS/FAIL: NORMAL
NEUTROPHILS ABSOLUTE: 11.7 E9/L (ref 1.8–7.3)
NEUTROPHILS RELATIVE PERCENT: 71.7 % (ref 43–80)
NITRITE, URINE: NEGATIVE
PDW BLD-RTO: 12.9 FL (ref 11.5–15)
PH UA: 8 (ref 5–9)
PLATELET # BLD: 321 E9/L (ref 130–450)
PMV BLD AUTO: 8.7 FL (ref 7–12)
POSITIVE QC PASS/FAIL: NORMAL
POTASSIUM REFLEX MAGNESIUM: 4.3 MMOL/L (ref 3.5–5)
PROTEIN UA: NEGATIVE MG/DL
RBC # BLD: 4.84 E12/L (ref 3.5–5.5)
RBC UA: >20 /HPF (ref 0–2)
SODIUM BLD-SCNC: 136 MMOL/L (ref 132–146)
SPECIFIC GRAVITY UA: 1.02 (ref 1–1.03)
TOTAL PROTEIN: 7.5 G/DL (ref 6.4–8.3)
UROBILINOGEN, URINE: 0.2 E.U./DL
WBC # BLD: 16.3 E9/L (ref 4.5–11.5)
WBC UA: >20 /HPF (ref 0–5)

## 2022-02-12 PROCEDURE — 85025 COMPLETE CBC W/AUTO DIFF WBC: CPT

## 2022-02-12 PROCEDURE — 99284 EMERGENCY DEPT VISIT MOD MDM: CPT

## 2022-02-12 PROCEDURE — 74176 CT ABD & PELVIS W/O CONTRAST: CPT

## 2022-02-12 PROCEDURE — 80053 COMPREHEN METABOLIC PANEL: CPT

## 2022-02-12 PROCEDURE — 6370000000 HC RX 637 (ALT 250 FOR IP): Performed by: PHYSICIAN ASSISTANT

## 2022-02-12 PROCEDURE — 81001 URINALYSIS AUTO W/SCOPE: CPT

## 2022-02-12 PROCEDURE — 87088 URINE BACTERIA CULTURE: CPT

## 2022-02-12 PROCEDURE — 36415 COLL VENOUS BLD VENIPUNCTURE: CPT

## 2022-02-12 RX ORDER — CEPHALEXIN 500 MG/1
500 CAPSULE ORAL 3 TIMES DAILY
Qty: 21 CAPSULE | Refills: 0 | Status: SHIPPED | OUTPATIENT
Start: 2022-02-12 | End: 2022-02-19

## 2022-02-12 RX ORDER — NAPROXEN 500 MG/1
500 TABLET ORAL 2 TIMES DAILY WITH MEALS
Qty: 20 TABLET | Refills: 0 | Status: SHIPPED | OUTPATIENT
Start: 2022-02-12 | End: 2022-02-22

## 2022-02-12 RX ORDER — NAPROXEN 500 MG/1
500 TABLET ORAL ONCE
Status: COMPLETED | OUTPATIENT
Start: 2022-02-12 | End: 2022-02-12

## 2022-02-12 RX ORDER — ONDANSETRON 4 MG/1
4 TABLET, ORALLY DISINTEGRATING ORAL 3 TIMES DAILY PRN
Qty: 21 TABLET | Refills: 0 | Status: SHIPPED | OUTPATIENT
Start: 2022-02-12 | End: 2022-02-22

## 2022-02-12 RX ADMIN — NAPROXEN 500 MG: 500 TABLET ORAL at 11:55

## 2022-02-12 ASSESSMENT — PAIN DESCRIPTION - FREQUENCY: FREQUENCY: CONTINUOUS

## 2022-02-12 ASSESSMENT — PAIN SCALES - GENERAL
PAINLEVEL_OUTOF10: 3
PAINLEVEL_OUTOF10: 2

## 2022-02-12 ASSESSMENT — PAIN DESCRIPTION - DESCRIPTORS: DESCRIPTORS: ACHING

## 2022-02-12 ASSESSMENT — PAIN DESCRIPTION - LOCATION: LOCATION: FLANK

## 2022-02-12 ASSESSMENT — PAIN DESCRIPTION - PAIN TYPE: TYPE: ACUTE PAIN

## 2022-02-12 ASSESSMENT — PAIN DESCRIPTION - ORIENTATION: ORIENTATION: RIGHT;LEFT

## 2022-02-12 NOTE — Clinical Note
Too Khan was seen and treated in our emergency department on 2/12/2022. He may return to work on 02/13/2022. If you have any questions or concerns, please don't hesitate to call.       LAURA Washington

## 2022-02-12 NOTE — ED PROVIDER NOTES
[facility]    HPI:  2/12/22, Time: 9:46 AM ИВАН Hussein is a 23 y.o. adult presenting to the ED for left flank pain, beginning this morning upon awakening. The complaint has been persistent, moderate in severity, and worsened by nothing. Also reports hematuria, dysuria, and urgency. Does have a history of UTI's in the past.  Patient was at urgent care prior to arrival in the ED but was advised to come to the ED to r/o a kidney stone. Denies nausea, vomiting, or diarrhea. Afebrile without recent travel or sick contacts. Patient denies all other symptoms at this time. Review of Systems:   A complete review of systems was performed and pertinent positives and negatives are stated within HPI, all other systems reviewed and are negative.          --------------------------------------------- PAST HISTORY ---------------------------------------------  Past Medical History:  has a past medical history of Acid reflux, Anxiety, Depression, Excessive physiologic tremor, Gender dysphoria in adult, Sleep disturbance, Tremor, and Vitamin D deficiency. Past Surgical History:  has no past surgical history on file. Social History:  reports that he has never smoked. He has never used smokeless tobacco.  Drug: Marijuana Thurl Cipro). He reports that he does not drink alcohol. Family History: family history includes Asthma in his paternal aunt; Cancer in his maternal grandfather; Depression in his maternal uncle; Heart Attack in his paternal grandfather; High Blood Pressure in his father and maternal grandmother; Hypothyroidism in his maternal grandmother and mother; Irritable Bowel Syndrome in his sister; Stroke in his paternal grandmother. The patients home medications have been reviewed. Allergies: Patient has no known allergies.     -------------------------------------------------- RESULTS -------------------------------------------------  All laboratory and radiology results have been personally reviewed by myself   LABS:  Results for orders placed or performed during the hospital encounter of 02/12/22   CBC Auto Differential   Result Value Ref Range    WBC 16.3 (H) 4.5 - 11.5 E9/L    RBC 4.84 3.50 - 5.50 E12/L    Hemoglobin 14.8 11.5 - 15.5 g/dL    Hematocrit 45.5 34.0 - 48.0 %    MCV 94.0 80.0 - 99.9 fL    MCH 30.6 26.0 - 35.0 pg    MCHC 32.5 32.0 - 34.5 %    RDW 12.9 11.5 - 15.0 fL    Platelets 753 692 - 157 E9/L    MPV 8.7 7.0 - 12.0 fL    Neutrophils % 71.7 43.0 - 80.0 %    Immature Granulocytes % 0.4 0.0 - 5.0 %    Lymphocytes % 20.1 20.0 - 42.0 %    Monocytes % 5.7 2.0 - 12.0 %    Eosinophils % 1.7 0.0 - 6.0 %    Basophils % 0.4 0.0 - 2.0 %    Neutrophils Absolute 11.70 (H) 1.80 - 7.30 E9/L    Immature Granulocytes # 0.07 E9/L    Lymphocytes Absolute 3.28 1.50 - 4.00 E9/L    Monocytes Absolute 0.93 0.10 - 0.95 E9/L    Eosinophils Absolute 0.28 0.05 - 0.50 E9/L    Basophils Absolute 0.07 0.00 - 0.20 E9/L   Comprehensive Metabolic Panel w/ Reflex to MG   Result Value Ref Range    Sodium 136 132 - 146 mmol/L    Potassium reflex Magnesium 4.3 3.5 - 5.0 mmol/L    Chloride 100 98 - 107 mmol/L    CO2 29 22 - 29 mmol/L    Anion Gap 7 7 - 16 mmol/L    Glucose 88 74 - 99 mg/dL    BUN 12 6 - 20 mg/dL    CREATININE 0.8 0.5 - 1.0 mg/dL    GFR Non-African American >60 >=60 mL/min/1.73    GFR African American >60     Calcium 10.0 8.6 - 10.2 mg/dL    Total Protein 7.5 6.4 - 8.3 g/dL    Albumin 4.6 3.5 - 5.2 g/dL    Total Bilirubin 0.4 0.0 - 1.2 mg/dL    Alkaline Phosphatase 77 35 - 104 U/L    ALT 13 0 - 32 U/L    AST 15 0 - 31 U/L   Urinalysis, reflex to microscopic   Result Value Ref Range    Color, UA Yellow Straw/Yellow    Clarity, UA CLOUDY (A) Clear    Glucose, Ur Negative Negative mg/dL    Bilirubin Urine Negative Negative    Ketones, Urine Negative Negative mg/dL    Specific Gravity, UA 1.020 1.005 - 1.030    Blood, Urine LARGE (A) Negative    pH, UA 8.0 5.0 - 9.0    Protein, UA Negative Negative mg/dL    Urobilinogen, Urine 0.2 <2.0 E.U./dL    Nitrite, Urine Negative Negative    Leukocyte Esterase, Urine LARGE (A) Negative   Microscopic Urinalysis   Result Value Ref Range    WBC, UA >20 (A) 0 - 5 /HPF    RBC, UA >20 0 - 2 /HPF    Epithelial Cells, UA FEW /HPF    Bacteria, UA FEW (A) None Seen /HPF   POC Pregnancy Urine   Result Value Ref Range    HCG, Urine, POC Negative Negative    Lot Number [id number]     Positive QC Pass/Fail Pass     Negative QC Pass/Fail Pass        RADIOLOGY:  Interpreted by Radiologist.  CT ABDOMEN PELVIS WO CONTRAST Additional Contrast? None   Final Result   No acute findings in the abdomen or pelvis. No evidence of hydronephrosis. No evidence of renal or ureteral calculus. RECOMMENDATIONS:   Unavailable             ------------------------- NURSING NOTES AND VITALS REVIEWED ---------------------------   The nursing notes within the ED encounter and vital signs as below have been reviewed. /76   Pulse 78   Temp 98.3 °F (36.8 °C) (Oral)   Resp 14   Ht 5' (1.524 m)   Wt 110 lb (49.9 kg)   SpO2 99%   BMI 21.48 kg/m²   Oxygen Saturation Interpretation: Normal      ---------------------------------------------------PHYSICAL EXAM--------------------------------------      Constitutional/General: Alert and oriented x3, well appearing, non toxic in NAD  Head: Normocephalic and atraumatic  Eyes: PERRL, EOMI  Mouth: Oropharynx clear, handling secretions, no trismus  Neck: Supple, full ROM,   Pulmonary: Lungs clear to auscultation bilaterally, no wheezes, rales, or rhonchi. Not in respiratory distress  Cardiovascular:  Regular rate and rhythm, no murmurs, gallops, or rubs. 2+ distal pulses  Abdomen: Soft, non tender, non distended, minimal right CVA tenderness to palpation. Extremities: Moves all extremities x 4.  Warm and well perfused  Skin: warm and dry without rash  Neurologic: GCS 15,  Psych: Normal Affect      ------------------------------ ED COURSE/MEDICAL DECISION MAKING----------------------  Medications   naproxen (NAPROSYN) tablet 500 mg (500 mg Oral Given 2/12/22 1155)         ED COURSE:  ED Course as of 02/12/22 1242   Sat Feb 12, 2022   1148 Reassessed patient. Remained stable neurovascularly intact. Discussed results with the patient and friend. Patient did have a large amount of blood and leukocytes in the urinalysis. No kidney stone noted on CT or other significant abnormality. Renal function within normal limits. At this time we will plan for treatment of urinary tract infection as there were leukocytes, white blood cells in the urine and white blood cell count is slightly elevated and blood work. Patient is nontoxic-appearing, afebrile, no acute distress. We will plan for outpatient symptom management as well as antibiotics. Advised follow-up with PCP for recheck return the emergency department with any new or worsening symptoms. Patient and friend voiced understanding and are agreeable to the above treatment plan. [MS]      ED Course User Index  [MS] Handy Lance       Medical Decision Making:    See ED course above. Counseling: The emergency provider has spoken with the patient and friend and discussed todays results, in addition to providing specific details for the plan of care and counseling regarding the diagnosis and prognosis. Questions are answered at this time and they are agreeable with the plan.      --------------------------------- IMPRESSION AND DISPOSITION ---------------------------------    IMPRESSION  1. Flank pain    2. Acute cystitis with hematuria        DISPOSITION  Disposition: Discharge to home  Patient condition is stable      NOTE: This report was transcribed using voice recognition software.  Every effort was made to ensure accuracy; however, inadvertent computerized transcription errors may be present        Handy Lance  02/12/22 1246

## 2022-02-13 LAB — URINE CULTURE, ROUTINE: NORMAL

## 2022-02-22 ENCOUNTER — OFFICE VISIT (OUTPATIENT)
Dept: FAMILY MEDICINE CLINIC | Age: 27
End: 2022-02-22
Payer: COMMERCIAL

## 2022-02-22 VITALS
RESPIRATION RATE: 18 BRPM | TEMPERATURE: 97.7 F | DIASTOLIC BLOOD PRESSURE: 68 MMHG | HEART RATE: 87 BPM | HEIGHT: 60 IN | OXYGEN SATURATION: 98 % | BODY MASS INDEX: 22.38 KG/M2 | WEIGHT: 114 LBS | SYSTOLIC BLOOD PRESSURE: 104 MMHG

## 2022-02-22 DIAGNOSIS — Z00.00 ROUTINE PHYSICAL EXAMINATION: ICD-10-CM

## 2022-02-22 DIAGNOSIS — R25.1 EXCESSIVE PHYSIOLOGIC TREMOR: ICD-10-CM

## 2022-02-22 DIAGNOSIS — J30.9 ALLERGIC RHINITIS, UNSPECIFIED SEASONALITY, UNSPECIFIED TRIGGER: ICD-10-CM

## 2022-02-22 DIAGNOSIS — K21.9 GASTROESOPHAGEAL REFLUX DISEASE WITHOUT ESOPHAGITIS: Primary | ICD-10-CM

## 2022-02-22 DIAGNOSIS — F32.A ANXIETY AND DEPRESSION: ICD-10-CM

## 2022-02-22 DIAGNOSIS — F41.9 ANXIETY AND DEPRESSION: ICD-10-CM

## 2022-02-22 DIAGNOSIS — E55.9 VITAMIN D DEFICIENCY: ICD-10-CM

## 2022-02-22 LAB
CHOLESTEROL, TOTAL: 163 MG/DL (ref 0–199)
HDLC SERPL-MCNC: 44 MG/DL
LDL CHOLESTEROL CALCULATED: 103 MG/DL (ref 0–99)
TRIGL SERPL-MCNC: 80 MG/DL (ref 0–149)
TSH SERPL DL<=0.05 MIU/L-ACNC: 1.44 UIU/ML (ref 0.27–4.2)
VLDLC SERPL CALC-MCNC: 16 MG/DL

## 2022-02-22 PROCEDURE — G8482 FLU IMMUNIZE ORDER/ADMIN: HCPCS | Performed by: STUDENT IN AN ORGANIZED HEALTH CARE EDUCATION/TRAINING PROGRAM

## 2022-02-22 PROCEDURE — 99395 PREV VISIT EST AGE 18-39: CPT | Performed by: STUDENT IN AN ORGANIZED HEALTH CARE EDUCATION/TRAINING PROGRAM

## 2022-02-22 RX ORDER — ESOMEPRAZOLE MAGNESIUM 40 MG/1
CAPSULE, DELAYED RELEASE ORAL
Qty: 30 CAPSULE | Refills: 3 | Status: SHIPPED
Start: 2022-02-22 | End: 2022-06-27 | Stop reason: SDUPTHER

## 2022-02-22 RX ORDER — ERGOCALCIFEROL 1.25 MG/1
CAPSULE ORAL
Qty: 4 CAPSULE | Refills: 3 | Status: SHIPPED
Start: 2022-02-22 | End: 2022-07-29

## 2022-02-22 RX ORDER — PRIMIDONE 50 MG/1
50 TABLET ORAL NIGHTLY
Qty: 30 TABLET | Refills: 3 | Status: SHIPPED
Start: 2022-02-22 | End: 2022-04-27

## 2022-02-22 ASSESSMENT — ENCOUNTER SYMPTOMS
RHINORRHEA: 0
ABDOMINAL PAIN: 0
BACK PAIN: 0
NAUSEA: 0
DIARRHEA: 0
CHEST TIGHTNESS: 0
EYE PAIN: 0
CONSTIPATION: 0
VOMITING: 0
COUGH: 0
SHORTNESS OF BREATH: 0

## 2022-02-22 NOTE — PROGRESS NOTES
2/22/2022    [facility]  Chief Complaint   Patient presents with    Annual Exam       Michelle Matias is a 23 y.o. adult who  has a past medical history of Acid reflux, Anxiety, Depression, Excessive physiologic tremor, Gender dysphoria in adult, Sleep disturbance, Tremor, and Vitamin D deficiency. Patient here for an annual examination. He has been doing well with his knee pain. He has been seeing a chiropractor and doing well with this. He is having issues still with his tremor. He states that neurology told him there was nothing else to do. nexium working for his GERD. He is following with the [address] [place] [address] for testosterone and sees them every 3 months. Review of Systems   Constitutional: Negative for chills, fatigue and fever. HENT: Negative for congestion, ear pain, postnasal drip and rhinorrhea. Eyes: Negative for pain. Respiratory: Negative for cough, chest tightness and shortness of breath. Cardiovascular: Negative for chest pain. Gastrointestinal: Negative for abdominal pain, constipation, diarrhea, nausea and vomiting. GERD+   Genitourinary: Negative for difficulty urinating, dysuria and frequency. Musculoskeletal: Positive for arthralgias (knee pain resolving ). Negative for back pain and neck pain. Skin: Negative for rash. Neurological: Positive for tremors. Negative for dizziness, light-headedness, numbness and headaches. Psychiatric/Behavioral: The patient is nervous/anxious. Prior to Visit Medications    Medication Sig Taking?  Authorizing Provider   primidone (MYSOLINE) 50 MG tablet Take 1 tablet by mouth nightly Yes Michelle Noel DO   esomeprazole (NEXIUM) 40 MG delayed release capsule TAKE 1 CAPSULE BY MOUTH ONCE A DAY Yes Michelle Noel DO   vitamin D (ERGOCALCIFEROL) 1.25 MG (59892 UT) CAPS capsule TAKE 1 CAPSULE BY MOUTH ONCE A WEEK Yes Michelel Noel DO   methylphenidate (RITALIN) 10 MG tablet TAKE 1 TABLET BY MOUTH EVERY DAY IN THE MORNING Yes Historical Provider, MD   naproxen (NAPROSYN) 500 MG tablet Take 1 tablet by mouth 2 times daily as needed for Pain Yes Bret Kaur PA-C   PARoxetine (PAXIL) 10 MG tablet Take 1 tablet by mouth daily Yes Michelle Noel DO   triamcinolone (KENALOG) 0.1 % cream Apply topically 2 times daily. Yes Michelle Neol DO   testosterone cypionate (DEPOTESTOTERONE CYPIONATE) 200 MG/ML injection Inject 50 mg into the muscle once a week. Yes Historical Provider, MD   chlorhexidine (PERIDEX) 0.12 % solution TAKE 15 ML BY MOUTH TWICE DAILY AFTER MEALS *SWISH IN MOUTH FOR 30 SECONDS THEN SPIT OUT* Yes Historical Provider, MD   fluticasone (FLONASE) 50 MCG/ACT nasal spray 2 sprays by Each Nostril route daily Yes Michelle Noel DO   buPROPion (WELLBUTRIN XL) 150 MG extended release tablet Take 150 mg by mouth every morning Yes Historical Provider, MD   ondansetron (ZOFRAN-ODT) 4 MG disintegrating tablet Take 1 tablet by mouth 3 times daily as needed for Nausea or Vomiting  LAURA Florian        No Known Allergies    Past Medical History:   Diagnosis Date    Acid reflux     Anxiety     Depression     Excessive physiologic tremor 3/29/2021    Gender dysphoria in adult 3/29/2021    Sleep disturbance 3/29/2021    Tremor 3/29/2021    Vitamin D deficiency        History reviewed. No pertinent surgical history.     Social History     Socioeconomic History    Marital status: Single     Spouse name: Not on file    Number of children: Not on file    Years of education: Not on file    Highest education level: Not on file   Occupational History    Not on file   Tobacco Use    Smoking status: Never Smoker    Smokeless tobacco: Never Used   Substance and Sexual Activity    Alcohol use: Never    Drug use: Not on file     Comment: last used in September 2020    Sexual activity: Yes   Other Topics Concern    Not on file   Social History Narrative    Not on file     Social Determinants of Health     Financial Resource Strain: Medium Risk    Difficulty of Paying Living Expenses: Somewhat hard   Food Insecurity: No Food Insecurity    Worried About Running Out of Food in the Last Year: Never true    Ran Out of Food in the Last Year: Never true   Transportation Needs:     Lack of Transportation (Medical): Not on file    Lack of Transportation (Non-Medical): Not on file   Physical Activity:     Days of Exercise per Week: Not on file    Minutes of Exercise per Session: Not on file   Stress:     Feeling of Stress : Not on file   Social Connections:     Frequency of Communication with Friends and Family: Not on file    Frequency of Social Gatherings with Friends and Family: Not on file    Attends Zoroastrianism Services: Not on file    Active Member of [address] [place] [address] [employer] or Organizations: Not on file    Attends Club or Organization Meetings: Not on file    Marital Status: Not on file   Intimate Partner Violence:     Fear of Current or Ex-Partner: Not on file    Emotionally Abused: Not on file    Physically Abused: Not on file    Sexually Abused: Not on file   Housing Stability:     Unable to Pay for Housing in the Last Year: Not on file    Number of [place] in the Last Year: Not on file    Unstable Housing in the Last Year: Not on file        Family History   Problem Relation Age of Onset    Hypothyroidism Mother     High Blood Pressure Father     Depression Maternal Uncle     Asthma Paternal Aunt     High Blood Pressure Maternal Grandmother     Hypothyroidism Maternal Grandmother     Cancer Maternal Grandfather     Stroke Paternal Grandmother     Heart Attack Paternal Grandfather     Irritable Bowel Syndrome Sister            Vitals:    02/22/22 1100   BP: 104/68   Pulse: 87   Resp: 18   Temp: 97.7 °F (36.5 °C)   TempSrc: Temporal   SpO2: 98%   Weight: 114 lb (51.7 kg)   Height: 5' (1.524 m)     Estimated body mass index is 22.26 kg/m² as calculated from the following:    Height as of this encounter: 5' (1.524 m).     Weight as of this encounter: 114 lb (51.7 kg).     Most Recent Labs  CBC  Lab Results   Component Value Date    WBC 16.3 02/12/2022    WBC 6.5 12/14/2021    WBC 8.6 03/02/2021    RBC 4.84 02/12/2022    RBC 4.99 12/14/2021    RBC 4.57 03/02/2021    HGB 14.8 02/12/2022    HGB 14.8 12/14/2021    HGB 13.8 03/02/2021    HCT 45.5 02/12/2022    HCT 46.4 12/14/2021    HCT 43.1 03/02/2021    MCV 94.0 02/12/2022    MCV 93.0 12/14/2021    MCV 94.3 03/02/2021     02/12/2022     12/14/2021     03/02/2021      CMP  Lab Results   Component Value Date     02/12/2022     03/02/2021    K 4.3 02/12/2022    K 4.7 03/02/2021     02/12/2022     03/02/2021    CO2 29 02/12/2022    CO2 24 03/02/2021    ANIONGAP 7 02/12/2022    ANIONGAP 10 03/02/2021    GLUCOSE 88 02/12/2022    GLUCOSE 86 03/02/2021    BUN 12 02/12/2022    BUN 9 03/02/2021    CREATININE 0.8 02/12/2022    CREATININE 0.8 03/02/2021    LABGLOM >60 02/12/2022    LABGLOM >60 03/02/2021    GFRAA >60 02/12/2022    GFRAA >60 03/02/2021    CALCIUM 10.0 02/12/2022    CALCIUM 9.1 03/02/2021    PROT 7.5 02/12/2022    PROT 7.3 03/02/2021    LABALBU 4.6 02/12/2022    LABALBU 4.5 03/02/2021    BILITOT 0.4 02/12/2022    BILITOT 0.3 03/02/2021    ALKPHOS 77 02/12/2022    ALKPHOS 50 03/02/2021    AST 15 02/12/2022    AST 16 03/02/2021    ALT 13 02/12/2022    ALT 20 03/02/2021     TSH  Lab Results   Component Value Date    TSH 1.990 03/02/2021     LIPID  Lab Results   Component Value Date    CHOL 134 03/16/2021    HDL 44 03/16/2021    LDLCALC 82 03/16/2021    TRIG 38 03/16/2021     VITAMIN D  Lab Results   Component Value Date    VITD25 52 12/14/2021    VITD25 38 02/15/2021     UA  Lab Results   Component Value Date    COLORU Yellow 02/12/2022    CLARITYU CLOUDY 02/12/2022    GLUCOSEU Negative 02/12/2022    BILIRUBINUR Negative 02/12/2022    KETUA Negative 02/12/2022    SPECGRAV 1.020 02/12/2022    BLOODU LARGE 02/12/2022    PHUR 8.0 02/12/2022    PROTEINU Negative 02/12/2022    UROBILINOGEN 0.2 02/12/2022    NITRU Negative 02/12/2022    LEUKOCYTESUR LARGE 02/12/2022       Physical Exam  Vitals and nursing note reviewed. Constitutional:       Appearance: Normal appearance. HENT:      Head: Normocephalic and atraumatic. Right Ear: Tympanic membrane, ear canal and external ear normal.      Left Ear: Tympanic membrane, ear canal and external ear normal.      Nose: Nose normal.      Mouth/Throat:      Mouth: Mucous membranes are moist.   Eyes:      Extraocular Movements: Extraocular movements intact. Pupils: Pupils are equal, round, and reactive to light. Cardiovascular:      Rate and Rhythm: Normal rate and regular rhythm. Pulses: Normal pulses. Heart sounds: Normal heart sounds. Pulmonary:      Effort: Pulmonary effort is normal.      Breath sounds: Normal breath sounds. Abdominal:      General: Bowel sounds are normal.      Palpations: Abdomen is soft. Musculoskeletal:         General: Normal range of motion. Cervical back: Normal range of motion and neck supple. Skin:     General: Skin is warm and dry. Capillary Refill: Capillary refill takes less than 2 seconds. Neurological:      General: No focal deficit present. Mental Status: He is alert and oriented to person, place, and time. Psychiatric:         Mood and Affect: Mood normal.         Behavior: Behavior normal.         Thought Content: Thought content normal.         ASSESSMENT/PLAN:  Sandra Moncada was seen today for annual exam.    Diagnoses and all orders for this visit:    Gastroesophageal reflux disease without esophagitis  -     esomeprazole (NEXIUM) 40 MG delayed release capsule; TAKE 1 CAPSULE BY MOUTH ONCE A DAY    Excessive physiologic tremor  -     primidone (MYSOLINE) 50 MG tablet;  Take 1 tablet by mouth nightly    Vitamin D deficiency  -     vitamin D (ERGOCALCIFEROL) 1.25 MG (45046 UT) CAPS capsule; TAKE 1 CAPSULE BY MOUTH ONCE A WEEK    Anxiety and depression    Routine physical examination  -     Lipid Panel; Future  -     TSH with Reflex to FT4; Future  -     Hemoglobin A1C; Future    Allergic rhinitis, unspecified seasonality, unspecified trigger  -     External Referral To Allergy         As above. Call or go to the nearest ED immediately if symptoms worsen or persist.  Educational materials and/or home exercises printed for patient's review and were included in patient instructions on his/her After Visit Summary and given to patient at the end of visit. Counseled regarding above diagnosis, including possible risks and complications,  especially if left uncontrolled. Counseled regarding the possible side effects, risks, benefits and alternatives to treatment; patient and/or guardian verbalizes understanding, agrees, feels comfortable with and wishes to proceed with above treatment plan. Advised patient to call with any new medication issues, and read all Rx info from pharmacy to assure aware of all possible risks and side effects of medication before taking. Reviewed age and gender appropriate health screening exams and vaccinations. Advised patient regarding importance of keeping up with recommended health maintenance and to schedule as soon as possible if overdue, as this is important in assessing for undiagnosed pathology, especially cancer, as well as protecting against potentially harmful/life threatening disease. Patient and/or guardian verbalizes understanding and agrees with above counseling, assessment and plan. All questions answered. Return in about 3 months (around 5/22/2022), or if symptoms worsen or fail to improve. An  electronic signature was used to authenticate this note.     --Shaheen Toro, DO on 2/22/2022 at 11:49 AM

## 2022-02-23 LAB — HBA1C MFR BLD: 5.1 % (ref 4–5.6)

## 2022-03-08 ENCOUNTER — OFFICE VISIT (OUTPATIENT)
Dept: CHIROPRACTIC MEDICINE | Age: 27
End: 2022-03-08
Payer: COMMERCIAL

## 2022-03-08 VITALS
BODY MASS INDEX: 22.38 KG/M2 | WEIGHT: 114 LBS | OXYGEN SATURATION: 97 % | TEMPERATURE: 97.4 F | HEIGHT: 60 IN | RESPIRATION RATE: 16 BRPM | HEART RATE: 75 BPM

## 2022-03-08 DIAGNOSIS — G89.29 CHRONIC BILATERAL LOW BACK PAIN WITHOUT SCIATICA: ICD-10-CM

## 2022-03-08 DIAGNOSIS — M99.03 SEGMENTAL AND SOMATIC DYSFUNCTION OF LUMBAR REGION: Primary | ICD-10-CM

## 2022-03-08 DIAGNOSIS — M54.6 CHRONIC BILATERAL THORACIC BACK PAIN: ICD-10-CM

## 2022-03-08 DIAGNOSIS — M99.02 SEGMENTAL AND SOMATIC DYSFUNCTION OF THORACIC REGION: ICD-10-CM

## 2022-03-08 DIAGNOSIS — G89.29 CHRONIC BILATERAL THORACIC BACK PAIN: ICD-10-CM

## 2022-03-08 DIAGNOSIS — M54.2 CERVICALGIA: ICD-10-CM

## 2022-03-08 DIAGNOSIS — M99.01 SEGMENTAL AND SOMATIC DYSFUNCTION OF CERVICAL REGION: ICD-10-CM

## 2022-03-08 DIAGNOSIS — M54.50 CHRONIC BILATERAL LOW BACK PAIN WITHOUT SCIATICA: ICD-10-CM

## 2022-03-08 PROCEDURE — 99999 PR OFFICE/OUTPT VISIT,PROCEDURE ONLY: CPT | Performed by: CHIROPRACTOR

## 2022-03-08 PROCEDURE — 98941 CHIROPRACT MANJ 3-4 REGIONS: CPT | Performed by: CHIROPRACTOR

## 2022-03-08 NOTE — PROGRESS NOTES
3/8/22  Vega Herbert : 1995 Sex: adult  Age: 23 y.o. Chief Complaint   Patient presents with    Back Pain     Patient here for a follow up on back pain. HPI:   Pain has worsened. On average, pain is perceived as moderate (4-6 pain scale). Patient denies new numbness, new weakness, new tingling new injury. He reports he has been relatively inactive over the past few weeks, not working. Laying around a lot. No longer going to physical therapy for his knees. Describing aching and tightness throughout the neck upper back and lower back today. This is most prominent in the interscapular region per his identification. Current Outpatient Medications:     primidone (MYSOLINE) 50 MG tablet, Take 1 tablet by mouth nightly, Disp: 30 tablet, Rfl: 3    esomeprazole (NEXIUM) 40 MG delayed release capsule, TAKE 1 CAPSULE BY MOUTH ONCE A DAY, Disp: 30 capsule, Rfl: 3    vitamin D (ERGOCALCIFEROL) 1.25 MG (55361 UT) CAPS capsule, TAKE 1 CAPSULE BY MOUTH ONCE A WEEK, Disp: 4 capsule, Rfl: 3    METHYLPHENIDATE HCL PO, 30mg total, take a 10mg & 20mg., Disp: , Rfl:     PARoxetine (PAXIL) 10 MG tablet, Take 1 tablet by mouth daily, Disp: 30 tablet, Rfl: 3    triamcinolone (KENALOG) 0.1 % cream, Apply topically 2 times daily. , Disp: 80 g, Rfl: 3    chlorhexidine (PERIDEX) 0.12 % solution, TAKE 15 ML BY MOUTH TWICE DAILY AFTER MEALS *SWISH IN MOUTH FOR 30 SECONDS THEN SPIT OUT*, Disp: , Rfl:     fluticasone (FLONASE) 50 MCG/ACT nasal spray, 2 sprays by Each Nostril route daily, Disp: 3 Bottle, Rfl: 1    buPROPion (WELLBUTRIN XL) 150 MG extended release tablet, Take 150 mg by mouth every morning, Disp: , Rfl:     testosterone cypionate (DEPOTESTOTERONE CYPIONATE) 200 MG/ML injection, Inject 50 mg into the muscle once a week., Disp: , Rfl:     Exam:   Vitals:    22 1120   Pulse: 75   Resp: 16   Temp: 97.4 °F (36.3 °C)   SpO2: 97%     Appears well. No distress. Ambulating without difficulty. Good affect. There are active trigger points in the bilateral trapezius today. No midline pain with palpation  There are hypertonic and tender fibers noted today in the cervical, thoracic and lumbar paraspinal muscles. Joint fixation is noted with motion screening at C5-6, T3-6, T12-L1, L5-S1. Martín Madden was seen today for back pain. Diagnoses and all orders for this visit:    Segmental and somatic dysfunction of lumbar region    Segmental and somatic dysfunction of thoracic region    Chronic bilateral thoracic back pain    Chronic bilateral low back pain without sciatica    Cervicalgia    Segmental and somatic dysfunction of cervical region        Treatment Plan: Continued with diversified manipulation to the affected cervical, thoracic and lumbar segments listed. Tolerated well. I will see him back in 1 week for continued care.       Seen By:  Christiano Noyola DC

## 2022-03-16 ENCOUNTER — OFFICE VISIT (OUTPATIENT)
Dept: CHIROPRACTIC MEDICINE | Age: 27
End: 2022-03-16
Payer: COMMERCIAL

## 2022-03-16 VITALS — TEMPERATURE: 97.5 F | HEART RATE: 91 BPM | OXYGEN SATURATION: 99 %

## 2022-03-16 DIAGNOSIS — M54.6 CHRONIC BILATERAL THORACIC BACK PAIN: ICD-10-CM

## 2022-03-16 DIAGNOSIS — M99.03 SEGMENTAL AND SOMATIC DYSFUNCTION OF LUMBAR REGION: Primary | ICD-10-CM

## 2022-03-16 DIAGNOSIS — G89.29 CHRONIC BILATERAL LOW BACK PAIN WITHOUT SCIATICA: ICD-10-CM

## 2022-03-16 DIAGNOSIS — M99.01 SEGMENTAL AND SOMATIC DYSFUNCTION OF CERVICAL REGION: ICD-10-CM

## 2022-03-16 DIAGNOSIS — M99.02 SEGMENTAL AND SOMATIC DYSFUNCTION OF THORACIC REGION: ICD-10-CM

## 2022-03-16 DIAGNOSIS — M54.2 CERVICALGIA: ICD-10-CM

## 2022-03-16 DIAGNOSIS — G89.29 CHRONIC BILATERAL THORACIC BACK PAIN: ICD-10-CM

## 2022-03-16 DIAGNOSIS — M54.50 CHRONIC BILATERAL LOW BACK PAIN WITHOUT SCIATICA: ICD-10-CM

## 2022-03-16 PROCEDURE — 99999 PR OFFICE/OUTPT VISIT,PROCEDURE ONLY: CPT | Performed by: CHIROPRACTOR

## 2022-03-16 PROCEDURE — 98941 CHIROPRACT MANJ 3-4 REGIONS: CPT | Performed by: CHIROPRACTOR

## 2022-03-16 NOTE — PROGRESS NOTES
3/16/22  Yady Whitmore : 1995 Sex: adult  Age: 23 y.o. Chief Complaint   Patient presents with    Back Pain     Pt presents this AM for a 1 week follow up concerning his LB. States that although pain is still present this AM, he felt good overall following his previous visit. HPI:   Pain has improved. On average, pain is perceived as mild (3  pain scale). Patient denies new numbness, new weakness, new tingling  Is more pronounced in the mid back and low back today. Current Outpatient Medications:     primidone (MYSOLINE) 50 MG tablet, Take 1 tablet by mouth nightly, Disp: 30 tablet, Rfl: 3    esomeprazole (NEXIUM) 40 MG delayed release capsule, TAKE 1 CAPSULE BY MOUTH ONCE A DAY, Disp: 30 capsule, Rfl: 3    vitamin D (ERGOCALCIFEROL) 1.25 MG (28560 UT) CAPS capsule, TAKE 1 CAPSULE BY MOUTH ONCE A WEEK, Disp: 4 capsule, Rfl: 3    METHYLPHENIDATE HCL PO, 30mg total, take a 10mg & 20mg., Disp: , Rfl:     PARoxetine (PAXIL) 10 MG tablet, Take 1 tablet by mouth daily, Disp: 30 tablet, Rfl: 3    triamcinolone (KENALOG) 0.1 % cream, Apply topically 2 times daily. , Disp: 80 g, Rfl: 3    testosterone cypionate (DEPOTESTOTERONE CYPIONATE) 200 MG/ML injection, Inject 50 mg into the muscle once a week., Disp: , Rfl:     chlorhexidine (PERIDEX) 0.12 % solution, TAKE 15 ML BY MOUTH TWICE DAILY AFTER MEALS *SWISH IN MOUTH FOR 30 SECONDS THEN SPIT OUT*, Disp: , Rfl:     fluticasone (FLONASE) 50 MCG/ACT nasal spray, 2 sprays by Each Nostril route daily, Disp: 3 Bottle, Rfl: 1    buPROPion (WELLBUTRIN XL) 150 MG extended release tablet, Take 150 mg by mouth every morning, Disp: , Rfl:     Exam:   Vitals:    22 1132   Pulse: 91   Temp: 97.5 °F (36.4 °C)   SpO2: 99%     Active trigger points in the bilateral trapezius. No midline pain. There is some tenderness L5-S1 region bilateral.  There are hypertonic and tender fibers noted today in the lumbar, thoracic and cervical paraspinal muscles. Joint fixation is noted with motion screening at L4-S1, T12-L1, T4-6, C6-7. Levi Peters was seen today for back pain. Diagnoses and all orders for this visit:    Segmental and somatic dysfunction of lumbar region    Segmental and somatic dysfunction of thoracic region    Chronic bilateral thoracic back pain    Chronic bilateral low back pain without sciatica    Segmental and somatic dysfunction of cervical region    Cervicalgia        Treatment Plan: Continued with diversified manipulation today to listed segments in the cervical, thoracic and lumbar regions. Tolerated well.   I will see him back in 2 weeks or as needed for care      Seen By:  Sadiq Meza

## 2022-03-30 ENCOUNTER — OFFICE VISIT (OUTPATIENT)
Dept: CHIROPRACTIC MEDICINE | Age: 27
End: 2022-03-30
Payer: COMMERCIAL

## 2022-03-30 VITALS — TEMPERATURE: 98.2 F | OXYGEN SATURATION: 100 % | HEART RATE: 73 BPM

## 2022-03-30 DIAGNOSIS — M99.05 SEGMENTAL AND SOMATIC DYSFUNCTION OF PELVIC REGION: ICD-10-CM

## 2022-03-30 DIAGNOSIS — M99.02 SEGMENTAL AND SOMATIC DYSFUNCTION OF THORACIC REGION: Primary | ICD-10-CM

## 2022-03-30 DIAGNOSIS — M54.6 CHRONIC BILATERAL THORACIC BACK PAIN: ICD-10-CM

## 2022-03-30 DIAGNOSIS — M54.50 CHRONIC BILATERAL LOW BACK PAIN WITHOUT SCIATICA: ICD-10-CM

## 2022-03-30 DIAGNOSIS — M99.03 SEGMENTAL AND SOMATIC DYSFUNCTION OF LUMBAR REGION: ICD-10-CM

## 2022-03-30 DIAGNOSIS — G89.29 CHRONIC BILATERAL THORACIC BACK PAIN: ICD-10-CM

## 2022-03-30 DIAGNOSIS — M53.3 SACROCOCCYGEAL DISORDERS, NOT ELSEWHERE CLASSIFIED: ICD-10-CM

## 2022-03-30 DIAGNOSIS — G89.29 CHRONIC BILATERAL LOW BACK PAIN WITHOUT SCIATICA: ICD-10-CM

## 2022-03-30 PROCEDURE — 99999 PR OFFICE/OUTPT VISIT,PROCEDURE ONLY: CPT | Performed by: CHIROPRACTOR

## 2022-03-30 PROCEDURE — 98941 CHIROPRACT MANJ 3-4 REGIONS: CPT | Performed by: CHIROPRACTOR

## 2022-03-30 NOTE — PROGRESS NOTES
3/30/22  Silvana Bergman : 1995 Sex: adult  Age: 23 y.o. Chief Complaint   Patient presents with    Back Pain     Pt presents this AM for a 2 week follow up concerning his LB. States that LB is feeling \"tense\" this AM, but he felt better following his previus visit. HPI:   Pain has improved. On average, pain is perceived as mild (1-3  pain scale). Patient denies new numbness, new weakness, new tingling      Current Outpatient Medications:     primidone (MYSOLINE) 50 MG tablet, Take 1 tablet by mouth nightly, Disp: 30 tablet, Rfl: 3    esomeprazole (NEXIUM) 40 MG delayed release capsule, TAKE 1 CAPSULE BY MOUTH ONCE A DAY, Disp: 30 capsule, Rfl: 3    vitamin D (ERGOCALCIFEROL) 1.25 MG (30859 UT) CAPS capsule, TAKE 1 CAPSULE BY MOUTH ONCE A WEEK, Disp: 4 capsule, Rfl: 3    METHYLPHENIDATE HCL PO, 30mg total, take a 10mg & 20mg., Disp: , Rfl:     PARoxetine (PAXIL) 10 MG tablet, Take 1 tablet by mouth daily, Disp: 30 tablet, Rfl: 3    triamcinolone (KENALOG) 0.1 % cream, Apply topically 2 times daily. , Disp: 80 g, Rfl: 3    testosterone cypionate (DEPOTESTOTERONE CYPIONATE) 200 MG/ML injection, Inject 50 mg into the muscle once a week., Disp: , Rfl:     chlorhexidine (PERIDEX) 0.12 % solution, TAKE 15 ML BY MOUTH TWICE DAILY AFTER MEALS *SWISH IN MOUTH FOR 30 SECONDS THEN SPIT OUT*, Disp: , Rfl:     fluticasone (FLONASE) 50 MCG/ACT nasal spray, 2 sprays by Each Nostril route daily, Disp: 3 Bottle, Rfl: 1    buPROPion (WELLBUTRIN XL) 150 MG extended release tablet, Take 150 mg by mouth every morning, Disp: , Rfl:     Exam:   Vitals:    22 1104   Pulse: 73   Temp: 98.2 °F (36.8 °C)   SpO2: 100%       There are hypertonic and tender fibers noted today in the thoracic and lumbar paraspinal muscles. Tender bilateral SI joints. No midline pain. Joint fixation is noted with motion screening at T4-6, T12-L1, L5-S1 and bilateral SI joints. Jenelle Dicker was seen today for back pain.    Diagnoses and all orders for this visit:    Segmental and somatic dysfunction of thoracic region    Segmental and somatic dysfunction of lumbar region    Segmental and somatic dysfunction of pelvic region    Chronic bilateral thoracic back pain    Chronic bilateral low back pain without sciatica    Sacrococcygeal disorders, not elsewhere classified        Treatment Plan:  Diversified manipulation to the above-listed segments in the thoracic spine, lumbar spine and SI joints. Tolerated well. I will see him back in approximately 2 weeks or as needed for care.           Seen By:  Vanessa Mejia DC

## 2022-04-01 ENCOUNTER — PATIENT MESSAGE (OUTPATIENT)
Dept: FAMILY MEDICINE CLINIC | Age: 27
End: 2022-04-01

## 2022-04-01 NOTE — TELEPHONE ENCOUNTER
From: Silvia Villanueva  To: Dr. Marcus Boogie: 4/1/2022 2:49 PM EDT  Subject: Referral     Hello,  I was trying to schedule with the neurology department and they said they needed a referral. Their fax is [phone]  Thank you,  Bertha Cedeño

## 2022-04-05 DIAGNOSIS — R25.1 EXCESSIVE PHYSIOLOGIC TREMOR: Primary | ICD-10-CM

## 2022-04-13 ENCOUNTER — OFFICE VISIT (OUTPATIENT)
Dept: CHIROPRACTIC MEDICINE | Age: 27
End: 2022-04-13
Payer: COMMERCIAL

## 2022-04-13 VITALS — HEART RATE: 82 BPM | TEMPERATURE: 98.1 F | OXYGEN SATURATION: 95 %

## 2022-04-13 DIAGNOSIS — G89.29 CHRONIC BILATERAL LOW BACK PAIN WITHOUT SCIATICA: ICD-10-CM

## 2022-04-13 DIAGNOSIS — M54.6 CHRONIC BILATERAL THORACIC BACK PAIN: ICD-10-CM

## 2022-04-13 DIAGNOSIS — M53.3 SACROCOCCYGEAL DISORDERS, NOT ELSEWHERE CLASSIFIED: ICD-10-CM

## 2022-04-13 DIAGNOSIS — M54.50 CHRONIC BILATERAL LOW BACK PAIN WITHOUT SCIATICA: ICD-10-CM

## 2022-04-13 DIAGNOSIS — M99.02 SEGMENTAL AND SOMATIC DYSFUNCTION OF THORACIC REGION: Primary | ICD-10-CM

## 2022-04-13 DIAGNOSIS — M99.05 SEGMENTAL AND SOMATIC DYSFUNCTION OF PELVIC REGION: ICD-10-CM

## 2022-04-13 DIAGNOSIS — G89.29 CHRONIC BILATERAL THORACIC BACK PAIN: ICD-10-CM

## 2022-04-13 DIAGNOSIS — M99.03 SEGMENTAL AND SOMATIC DYSFUNCTION OF LUMBAR REGION: ICD-10-CM

## 2022-04-13 PROCEDURE — 98941 CHIROPRACT MANJ 3-4 REGIONS: CPT | Performed by: CHIROPRACTOR

## 2022-04-13 PROCEDURE — 99999 PR OFFICE/OUTPT VISIT,PROCEDURE ONLY: CPT | Performed by: CHIROPRACTOR

## 2022-04-13 RX ORDER — HYDROXYZINE HYDROCHLORIDE 10 MG/1
TABLET, FILM COATED ORAL
COMMUNITY
Start: 2022-03-31

## 2022-04-13 NOTE — PROGRESS NOTES
23  Suleiman Herndon : 1995 Sex: adult  Age: 23 y.o. Chief Complaint   Patient presents with    Back Pain     Pt presents this AM for a follow up concerning his LB and neck symptoms. States that he is feeling \"tight\" today. HPI:   Pain is unchanged. On average, pain is perceived as mild (3  pain scale). Patient denies new numbness, new weakness, new tingling      Current Outpatient Medications:     primidone (MYSOLINE) 50 MG tablet, Take 1 tablet by mouth nightly, Disp: 30 tablet, Rfl: 3    esomeprazole (NEXIUM) 40 MG delayed release capsule, TAKE 1 CAPSULE BY MOUTH ONCE A DAY, Disp: 30 capsule, Rfl: 3    vitamin D (ERGOCALCIFEROL) 1.25 MG (67759 UT) CAPS capsule, TAKE 1 CAPSULE BY MOUTH ONCE A WEEK, Disp: 4 capsule, Rfl: 3    METHYLPHENIDATE HCL PO, 30mg total, take a 10mg & 20mg., Disp: , Rfl:     PARoxetine (PAXIL) 10 MG tablet, Take 1 tablet by mouth daily, Disp: 30 tablet, Rfl: 3    triamcinolone (KENALOG) 0.1 % cream, Apply topically 2 times daily. , Disp: 80 g, Rfl: 3    testosterone cypionate (DEPOTESTOTERONE CYPIONATE) 200 MG/ML injection, Inject 50 mg into the muscle once a week., Disp: , Rfl:     chlorhexidine (PERIDEX) 0.12 % solution, TAKE 15 ML BY MOUTH TWICE DAILY AFTER MEALS *SWISH IN MOUTH FOR 30 SECONDS THEN SPIT OUT*, Disp: , Rfl:     fluticasone (FLONASE) 50 MCG/ACT nasal spray, 2 sprays by Each Nostril route daily, Disp: 3 Bottle, Rfl: 1    buPROPion (WELLBUTRIN XL) 150 MG extended release tablet, Take 150 mg by mouth every morning, Disp: , Rfl:     Exam:   Vitals:    22 1133   Pulse: 82   Temp: 98.1 °F (36.7 °C)   SpO2: 95%     Active trigger points bilateral trapezius, right levator scapulae. There are hypertonic and tender fibers noted today in the lumbar, thoracic paraspinal muscles. Joint fixation is noted with motion screening at bilateral SI joints, L4-S1, T12-L1, T3-6. Therman Free was seen today for back pain.     Diagnoses and all orders for this visit:    Segmental and somatic dysfunction of thoracic region    Segmental and somatic dysfunction of lumbar region    Segmental and somatic dysfunction of pelvic region    Chronic bilateral low back pain without sciatica    Chronic bilateral thoracic back pain    Sacrococcygeal disorders, not elsewhere classified        Treatment Plan:     Diversified manipulation to the above-listed segments in the thoracic spine, lumbar spine and SI joints. Tolerated well. I will see him back in approximately 2 weeks or as needed for care.         Seen By:  Terri Bruner DC

## 2022-04-14 ENCOUNTER — HOSPITAL ENCOUNTER (EMERGENCY)
Age: 27
Discharge: HOME | End: 2022-04-14
Payer: COMMERCIAL

## 2022-04-14 ENCOUNTER — APPOINTMENT (OUTPATIENT)
Dept: CT IMAGING | Age: 27
End: 2022-04-14
Payer: COMMERCIAL

## 2022-04-14 VITALS
WEIGHT: 116 LBS | BODY MASS INDEX: 22.78 KG/M2 | DIASTOLIC BLOOD PRESSURE: 67 MMHG | OXYGEN SATURATION: 98 % | HEART RATE: 72 BPM | RESPIRATION RATE: 18 BRPM | SYSTOLIC BLOOD PRESSURE: 113 MMHG | TEMPERATURE: 98 F | HEIGHT: 60 IN

## 2022-04-14 DIAGNOSIS — K59.00 CONSTIPATION, UNSPECIFIED CONSTIPATION TYPE: ICD-10-CM

## 2022-04-14 DIAGNOSIS — R10.9 FLANK PAIN: ICD-10-CM

## 2022-04-14 DIAGNOSIS — N30.00 ACUTE CYSTITIS WITHOUT HEMATURIA: Primary | ICD-10-CM

## 2022-04-14 LAB
ALBUMIN SERPL-MCNC: 4.5 G/DL (ref 3.5–5.2)
ALP BLD-CCNC: 79 U/L (ref 35–104)
ALT SERPL-CCNC: 16 U/L (ref 0–32)
ANION GAP SERPL CALCULATED.3IONS-SCNC: 10 MMOL/L (ref 7–16)
AST SERPL-CCNC: 15 U/L (ref 0–31)
BACTERIA: ABNORMAL /HPF
BASOPHILS ABSOLUTE: 0.1 E9/L (ref 0–0.2)
BASOPHILS RELATIVE PERCENT: 0.8 % (ref 0–2)
BILIRUB SERPL-MCNC: <0.2 MG/DL (ref 0–1.2)
BILIRUBIN URINE: NEGATIVE
BLOOD, URINE: NEGATIVE
BUN BLDV-MCNC: 10 MG/DL (ref 6–20)
CALCIUM SERPL-MCNC: 9.6 MG/DL (ref 8.6–10.2)
CHLORIDE BLD-SCNC: 100 MMOL/L (ref 98–107)
CLARITY: CLEAR
CO2: 28 MMOL/L (ref 22–29)
COLOR: YELLOW
CREAT SERPL-MCNC: 0.8 MG/DL (ref 0.5–1)
EOSINOPHILS ABSOLUTE: 0.37 E9/L (ref 0.05–0.5)
EOSINOPHILS RELATIVE PERCENT: 2.8 % (ref 0–6)
GFR AFRICAN AMERICAN: >60
GFR NON-AFRICAN AMERICAN: >60 ML/MIN/1.73
GLUCOSE BLD-MCNC: 77 MG/DL (ref 74–99)
GLUCOSE URINE: NEGATIVE MG/DL
HCG, URINE, POC: NEGATIVE
HCT VFR BLD CALC: 44.4 % (ref 34–48)
HEMOGLOBIN: 14.5 G/DL (ref 11.5–15.5)
IMMATURE GRANULOCYTES #: 0.08 E9/L
IMMATURE GRANULOCYTES %: 0.6 % (ref 0–5)
KETONES, URINE: NEGATIVE MG/DL
LACTIC ACID: 1.1 MMOL/L (ref 0.5–2.2)
LEUKOCYTE ESTERASE, URINE: ABNORMAL
LIPASE: 49 U/L (ref 13–60)
LYMPHOCYTES ABSOLUTE: 2.4 E9/L (ref 1.5–4)
LYMPHOCYTES RELATIVE PERCENT: 18.5 % (ref 20–42)
Lab: NORMAL
MCH RBC QN AUTO: 30.1 PG (ref 26–35)
MCHC RBC AUTO-ENTMCNC: 32.7 % (ref 32–34.5)
MCV RBC AUTO: 92.3 FL (ref 80–99.9)
MONOCYTES ABSOLUTE: 0.71 E9/L (ref 0.1–0.95)
MONOCYTES RELATIVE PERCENT: 5.5 % (ref 2–12)
NEGATIVE QC PASS/FAIL: NORMAL
NEUTROPHILS ABSOLUTE: 9.34 E9/L (ref 1.8–7.3)
NEUTROPHILS RELATIVE PERCENT: 71.8 % (ref 43–80)
NITRITE, URINE: NEGATIVE
PDW BLD-RTO: 12.9 FL (ref 11.5–15)
PH UA: 7 (ref 5–9)
PLATELET # BLD: 328 E9/L (ref 130–450)
PMV BLD AUTO: 8.8 FL (ref 7–12)
POSITIVE QC PASS/FAIL: NORMAL
POTASSIUM SERPL-SCNC: 4.3 MMOL/L (ref 3.5–5)
PROTEIN UA: NEGATIVE MG/DL
RBC # BLD: 4.81 E12/L (ref 3.5–5.5)
RBC UA: ABNORMAL /HPF (ref 0–2)
SODIUM BLD-SCNC: 138 MMOL/L (ref 132–146)
SPECIFIC GRAVITY UA: 1.02 (ref 1–1.03)
TOTAL PROTEIN: 7.2 G/DL (ref 6.4–8.3)
UROBILINOGEN, URINE: 0.2 E.U./DL
WBC # BLD: 13 E9/L (ref 4.5–11.5)
WBC UA: ABNORMAL /HPF (ref 0–5)

## 2022-04-14 PROCEDURE — 81001 URINALYSIS AUTO W/SCOPE: CPT

## 2022-04-14 PROCEDURE — 96375 TX/PRO/DX INJ NEW DRUG ADDON: CPT

## 2022-04-14 PROCEDURE — 83605 ASSAY OF LACTIC ACID: CPT

## 2022-04-14 PROCEDURE — 80053 COMPREHEN METABOLIC PANEL: CPT

## 2022-04-14 PROCEDURE — 96374 THER/PROPH/DIAG INJ IV PUSH: CPT

## 2022-04-14 PROCEDURE — 2580000003 HC RX 258: Performed by: RADIOLOGY

## 2022-04-14 PROCEDURE — 6360000004 HC RX CONTRAST MEDICATION: Performed by: RADIOLOGY

## 2022-04-14 PROCEDURE — 99285 EMERGENCY DEPT VISIT HI MDM: CPT

## 2022-04-14 PROCEDURE — 74177 CT ABD & PELVIS W/CONTRAST: CPT

## 2022-04-14 PROCEDURE — 85025 COMPLETE CBC W/AUTO DIFF WBC: CPT

## 2022-04-14 PROCEDURE — 6360000002 HC RX W HCPCS: Performed by: PHYSICIAN ASSISTANT

## 2022-04-14 PROCEDURE — 83690 ASSAY OF LIPASE: CPT

## 2022-04-14 RX ORDER — SODIUM CHLORIDE 0.9 % (FLUSH) 0.9 %
10 SYRINGE (ML) INJECTION
Status: COMPLETED | OUTPATIENT
Start: 2022-04-14 | End: 2022-04-14

## 2022-04-14 RX ORDER — CEFDINIR 300 MG/1
300 CAPSULE ORAL 2 TIMES DAILY
Qty: 14 CAPSULE | Refills: 0 | Status: SHIPPED | OUTPATIENT
Start: 2022-04-14 | End: 2022-04-21

## 2022-04-14 RX ORDER — KETOROLAC TROMETHAMINE 30 MG/ML
15 INJECTION, SOLUTION INTRAMUSCULAR; INTRAVENOUS ONCE
Status: COMPLETED | OUTPATIENT
Start: 2022-04-14 | End: 2022-04-14

## 2022-04-14 RX ORDER — ONDANSETRON 2 MG/ML
4 INJECTION INTRAMUSCULAR; INTRAVENOUS ONCE
Status: COMPLETED | OUTPATIENT
Start: 2022-04-14 | End: 2022-04-14

## 2022-04-14 RX ADMIN — ONDANSETRON 4 MG: 2 INJECTION INTRAMUSCULAR; INTRAVENOUS at 09:10

## 2022-04-14 RX ADMIN — Medication 10 ML: at 10:01

## 2022-04-14 RX ADMIN — IOPAMIDOL 90 ML: 755 INJECTION, SOLUTION INTRAVENOUS at 10:01

## 2022-04-14 RX ADMIN — KETOROLAC TROMETHAMINE 15 MG: 30 INJECTION, SOLUTION INTRAMUSCULAR at 09:10

## 2022-04-14 ASSESSMENT — PAIN SCALES - GENERAL
PAINLEVEL_OUTOF10: 6
PAINLEVEL_OUTOF10: 6

## 2022-04-14 ASSESSMENT — PAIN DESCRIPTION - LOCATION: LOCATION: FLANK

## 2022-04-14 ASSESSMENT — PAIN DESCRIPTION - DESCRIPTORS: DESCRIPTORS: ACHING

## 2022-04-14 ASSESSMENT — PAIN DESCRIPTION - ORIENTATION: ORIENTATION: RIGHT;LEFT

## 2022-04-14 NOTE — ED NOTES
Department of Emergency Medicine  FIRST PROVIDER TRIAGE NOTE             Independent MLP           4/14/22  2:47 AM EDT    Date of Encounter: 4/14/22   MRN: [id number]      HPI: Elana Diaz is a 23 y.o. adult who presents to the ED for Flank Pain (Bilateral flank pain/ nausea onset 2000 yesterday; deneis chest pain sob emesis diarrhea urinary symptoms)  Patient presents with 1 day history of bilateral flank pain as well as pain to the right upper quadrant of the abdomen. No chest pain no shortness of breath no vomiting or diarrhea. Patient does take Macrodantin prophylactically after sexual intercourse due to having frequent urinary tract infections. Patient does have chronic thoracic and lumbar back pain unclear if it could be this. Patient without any blood in her urine. Symptoms moderate    ROS: Negative for cp or sob. PE: Gen Appearance/Constitutional: alert  HEENT: NC/NT. PERRLA,  Airway patent. Initial Plan of Care: All treatment areas with department are currently occupied. Plan to order/Initiate the following while awaiting opening in ED: labs.   Initiate Treatment-Testing, Proceed toTreatment Area When Bed Available for ED Attending/MLP to Continue Care    Electronically signed by TASHA Allen CNP   DD: 4/14/22         TASHA Allen CNP  04/14/22 0248    ATTENDING PROVIDER ATTESTATION:     Supervising Physician, on-site, available for consultation, non-participatory in the evaluation or care of this patient         Aylin Larose MD  04/14/22 6712

## 2022-04-14 NOTE — ED PROVIDER NOTES
[facility]  Department of Emergency Medicine   ED  Encounter Note  Admit Date/RoomTime: 2022  7:48 AM  ED Room: CHAIR01/    NAME: Yann Hopson  : 1995  MRN: [id number]     Chief Complaint:  Flank Pain (Bilateral flank pain/ nausea onset  yesterday; deneis chest pain sob emesis diarrhea urinary symptoms)    History of Present Illness       Yann Hopson is a 23 y.o. old adult who presents to the emergency department by private vehicle, for bilateral flank pain, abdominal pain, urinary frequency since yesterday. Patient states her symptoms are mild in severity, intermittent, and describes as an aching pain. Patient states that symptoms are associated with nausea. Patient denies anything making it better or worse. Patient states she has a Macrobid prescription at home for UTIs symptoms. Patient states she took 1 dose 2 days ago but has not taken any since. Denies fever/chills, headache, vision change, dizziness, cough, emesis, chest pain, dyspnea, vomiting, diarrhea, hematemesis, melena, bloody stool, hematuria, vaginal discharge, numbness/weakness. ROS   Pertinent positives and negatives are stated within HPI, all other systems reviewed and are negative. Past Medical History:  has a past medical history of Acid reflux, Anxiety, Depression, Excessive physiologic tremor, Gender dysphoria in adult, Sleep disturbance, Tremor, and Vitamin D deficiency. Surgical History has no past surgical history on file. Social History:  reports that he has never smoked. He has never used smokeless tobacco.  Drug: Marijuana [employer]). He reports that he does not drink alcohol. Family History: family history includes Asthma in his paternal aunt; Cancer in his maternal grandfather; Depression in his maternal uncle;  Heart Attack in his paternal grandfather; High Blood Pressure in his father and maternal grandmother; Hypothyroidism in his maternal grandmother and mother; Irritable Bowel Syndrome in his sister; Stroke in his paternal grandmother. Allergies: Patient has no known allergies. Physical Exam   Oxygen Saturation Interpretation: Normal on room air analysis. ED Triage Vitals   BP Temp Temp Source Pulse Resp SpO2 Height Weight   04/14/22 0247 04/14/22 0239 04/14/22 0239 04/14/22 0239 04/14/22 0239 04/14/22 0239 04/14/22 0239 04/14/22 0239   113/67 98 °F (36.7 °C) Oral 72 18 98 % 5' (1.524 m) 116 lb (52.6 kg)        Physical Exam  General Appearance/Constitutional:  Alert, development consistent with age. HEENT:  NC/NT. PERRLA. Airway patent. Neck:  Supple. No lymphadenopathy. Respiratory:  No retractions. Lungs Clear to auscultation and breath sounds equal.  CV:  Regular rate and rhythm. GI:  normal appearing, non-distended with no visible hernias. Bowel sounds: normal bowel sounds. Tenderness: There is mild tenderness present - located diffusely in the lower abdomen and in the right flank. .        Liver: non-tender. Spleen:  non-tender. Back: CVA Tenderness: No CVA tenderness. : /Pelvic examination deferred / declined. Integument:  Normal turgor. Warm, dry, without visible rash, unless noted elsewhere. Lymphatics: No edema, cap.refill <3sec. Neurological:  Orientation age-appropriate. Motor functions intact.     Lab / Imaging Results   (All laboratory and radiology results have been personally reviewed by myself)  Labs:  Results for orders placed or performed during the hospital encounter of 04/14/22   CBC with Auto Differential   Result Value Ref Range    WBC 13.0 (H) 4.5 - 11.5 E9/L    RBC 4.81 3.50 - 5.50 E12/L    Hemoglobin 14.5 11.5 - 15.5 g/dL    Hematocrit 44.4 34.0 - 48.0 %    MCV 92.3 80.0 - 99.9 fL    MCH 30.1 26.0 - 35.0 pg    MCHC 32.7 32.0 - 34.5 %    RDW 12.9 11.5 - 15.0 fL    Platelets 717 788 - 050 E9/L    MPV 8.8 7.0 - 12.0 fL    Neutrophils % 71.8 43.0 - 80.0 %    Immature Granulocytes % 0.6 0.0 - 5.0 %    Lymphocytes % 18.5 (L) 20.0 - 42.0 %    Monocytes % 5.5 2.0 - 12.0 %    Eosinophils % 2.8 0.0 - 6.0 %    Basophils % 0.8 0.0 - 2.0 %    Neutrophils Absolute 9.34 (H) 1.80 - 7.30 E9/L    Immature Granulocytes # 0.08 E9/L    Lymphocytes Absolute 2.40 1.50 - 4.00 E9/L    Monocytes Absolute 0.71 0.10 - 0.95 E9/L    Eosinophils Absolute 0.37 0.05 - 0.50 E9/L    Basophils Absolute 0.10 0.00 - 0.20 E9/L   Comprehensive Metabolic Panel   Result Value Ref Range    Sodium 138 132 - 146 mmol/L    Potassium 4.3 3.5 - 5.0 mmol/L    Chloride 100 98 - 107 mmol/L    CO2 28 22 - 29 mmol/L    Anion Gap 10 7 - 16 mmol/L    Glucose 77 74 - 99 mg/dL    BUN 10 6 - 20 mg/dL    CREATININE 0.8 0.5 - 1.0 mg/dL    GFR Non-African American >60 >=60 mL/min/1.73    GFR African American >60     Calcium 9.6 8.6 - 10.2 mg/dL    Total Protein 7.2 6.4 - 8.3 g/dL    Albumin 4.5 3.5 - 5.2 g/dL    Total Bilirubin <0.2 0.0 - 1.2 mg/dL    Alkaline Phosphatase 79 35 - 104 U/L    ALT 16 0 - 32 U/L    AST 15 0 - 31 U/L   Urinalysis   Result Value Ref Range    Color, UA Yellow Straw/Yellow    Clarity, UA Clear Clear    Glucose, Ur Negative Negative mg/dL    Bilirubin Urine Negative Negative    Ketones, Urine Negative Negative mg/dL    Specific Gravity, UA 1.025 1.005 - 1.030    Blood, Urine Negative Negative    pH, UA 7.0 5.0 - 9.0    Protein, UA Negative Negative mg/dL    Urobilinogen, Urine 0.2 <2.0 E.U./dL    Nitrite, Urine Negative Negative    Leukocyte Esterase, Urine TRACE (A) Negative   Lactic Acid   Result Value Ref Range    Lactic Acid 1.1 0.5 - 2.2 mmol/L   Lipase   Result Value Ref Range    Lipase 49 13 - 60 U/L   Microscopic Urinalysis   Result Value Ref Range    WBC, UA 1-3 0 - 5 /HPF    RBC, UA NONE 0 - 2 /HPF    Bacteria, UA RARE (A) None Seen /HPF   POC Pregnancy Urine   Result Value Ref Range    HCG, Urine, POC Negative Negative    Lot Number [id number]     Positive QC Pass/Fail Pass Negative QC Pass/Fail Pass      Imaging: All Radiology results interpreted by Radiologist unless otherwise noted. CT ABDOMEN PELVIS W IV CONTRAST Additional Contrast? None   Final Result   Contraction of the gallbladder with mild wall thickening likely related to   the contraction. No definite stones. No CT evidence of acute intra-abdominopelvic abnormality. Increased stool   seen scattered diffusely throughout the colon to suggest clinical   presentation of constipation. ED Course / Medical Decision Making     Medications   ketorolac (TORADOL) injection 15 mg (15 mg IntraVENous Given 4/14/22 0910)   ondansetron (ZOFRAN) injection 4 mg (4 mg IntraVENous Given 4/14/22 0910)   iopamidol (ISOVUE-370) 76 % injection 90 mL (90 mLs IntraVENous Given 4/14/22 1001)   sodium chloride flush 0.9 % injection 10 mL (10 mLs IntraVENous Given 4/14/22 1001)       Consultations:             None    Procedures:   none    MDM: Patient presenting with UTI symptoms and abdominal pain. Patient is in no acute distress, afebrile, nontoxic appearance. Patient has a slightly elevated white count 13 but other labs are stable. Patient's urine positive for leukocyte esterase and WBC. Patient CT showing constipation. Patient CT also showed contraction of the gallbladder but no definite stones. Patient had no right upper quadrant abdominal pain on exam.  Patient feeling better after medications given here. Patient be started on cefdinir recommend follow-up with PCP. Recommend patient return to the ED with new or worsening of symptoms. Plan of Care/Counseling:  STEPHAN Ho reviewed today's visit with the patient in addition to providing specific details for the plan of care and counseling regarding the diagnosis and prognosis. Questions are answered at this time and are agreeable with the plan. Assessment      1. Acute cystitis without hematuria    2. Flank pain    3.  Constipation, unspecified constipation type      This patient's ED course included: a personal history and physicial examination and multiple bedside re-evaluations  This patient has remained hemodynamically stable during their ED course. Plan   Discharged home  Patient condition is stable. New Medications     Discharge Medication List as of 4/14/2022 12:09 PM      START taking these medications    Details   cefdinir (OMNICEF) 300 MG capsule Take 1 capsule by mouth 2 times daily for 7 days, Disp-14 capsule, R-0Normal           Electronically signed by Ariana Marrero PA-C   DD: 4/14/22  **This report was transcribed using voice recognition software. Every effort was made to ensure accuracy; however, inadvertent computerized transcription errors may be present.   END OF PROVIDER NOTE     Ariana Marrero PA-C  04/14/22 [id number]

## 2022-04-18 ENCOUNTER — OFFICE VISIT (OUTPATIENT)
Dept: FAMILY MEDICINE CLINIC | Age: 27
End: 2022-04-18
Payer: COMMERCIAL

## 2022-04-18 VITALS
TEMPERATURE: 98.6 F | HEIGHT: 60 IN | WEIGHT: 116.4 LBS | SYSTOLIC BLOOD PRESSURE: 116 MMHG | DIASTOLIC BLOOD PRESSURE: 70 MMHG | BODY MASS INDEX: 22.85 KG/M2 | HEART RATE: 104 BPM | OXYGEN SATURATION: 98 %

## 2022-04-18 DIAGNOSIS — R06.02 SHORTNESS OF BREATH: ICD-10-CM

## 2022-04-18 DIAGNOSIS — R25.1 EXCESSIVE PHYSIOLOGIC TREMOR: Chronic | ICD-10-CM

## 2022-04-18 DIAGNOSIS — K21.9 GASTROESOPHAGEAL REFLUX DISEASE WITHOUT ESOPHAGITIS: Primary | ICD-10-CM

## 2022-04-18 PROCEDURE — 1036F TOBACCO NON-USER: CPT | Performed by: STUDENT IN AN ORGANIZED HEALTH CARE EDUCATION/TRAINING PROGRAM

## 2022-04-18 PROCEDURE — 99213 OFFICE O/P EST LOW 20 MIN: CPT | Performed by: STUDENT IN AN ORGANIZED HEALTH CARE EDUCATION/TRAINING PROGRAM

## 2022-04-18 PROCEDURE — G8427 DOCREV CUR MEDS BY ELIG CLIN: HCPCS | Performed by: STUDENT IN AN ORGANIZED HEALTH CARE EDUCATION/TRAINING PROGRAM

## 2022-04-18 PROCEDURE — G8420 CALC BMI NORM PARAMETERS: HCPCS | Performed by: STUDENT IN AN ORGANIZED HEALTH CARE EDUCATION/TRAINING PROGRAM

## 2022-04-18 RX ORDER — NITROFURANTOIN MACROCRYSTALS 100 MG/1
100 CAPSULE ORAL DAILY
COMMUNITY
Start: 2022-03-07

## 2022-04-18 RX ORDER — METHYLPHENIDATE HYDROCHLORIDE 20 MG/1
TABLET ORAL
COMMUNITY
Start: 2022-03-31

## 2022-04-18 RX ORDER — LIDOCAINE HYDROCHLORIDE 20 MG/ML
JELLY TOPICAL
COMMUNITY
Start: 2022-02-25

## 2022-04-18 RX ORDER — KETOCONAZOLE 20 MG/ML
SHAMPOO TOPICAL
COMMUNITY
Start: 2022-04-05

## 2022-04-18 RX ORDER — METHYLPHENIDATE HYDROCHLORIDE 10 MG/1
TABLET ORAL
COMMUNITY
Start: 2022-03-31

## 2022-04-18 ASSESSMENT — ENCOUNTER SYMPTOMS
SHORTNESS OF BREATH: 1
EYE PAIN: 0
COUGH: 0
CONSTIPATION: 0
VOMITING: 0
NAUSEA: 0
SINUS PRESSURE: 0
CHEST TIGHTNESS: 0
BACK PAIN: 0
RHINORRHEA: 0
SINUS PAIN: 0
DIARRHEA: 0
ABDOMINAL PAIN: 0

## 2022-04-18 NOTE — PROGRESS NOTES
4/18/2022    [facility]  Chief Complaint   Patient presents with    Abdominal Pain     Went to ER on 4/15/22, is doing better. Would like to know if or when she should go to ER for things like this       Shamir Calderon is a 23 y.o. adult who  has a past medical history of Acid reflux, Anxiety, Depression, Excessive physiologic tremor, Gender dysphoria in adult, Sleep disturbance, Tremor, and Vitamin D deficiency. Patient went to the ER a few days ago. He was diagnosed with a UTI. He took a dose of macrobid because his OB gave him that for after sexual intercourse. He just takes one pill after intercourse. He states his symptoms were not the normal UTI sx he has. He thought It was possibly a kidney stone or hunger pains. CT showed constipation but no stones. Patient also wondering if that he does not sneeze a lot if this is an issue. It is not. Patient also has family history of SOB. He states that he gets very SOB after working out. He would like to be worked up for asthma     Review of Systems   Constitutional: Negative for chills, fatigue and fever. HENT: Negative for congestion, ear pain, postnasal drip, rhinorrhea, sinus pressure and sinus pain. Eyes: Negative for pain. Respiratory: Positive for shortness of breath (on exertion). Negative for cough and chest tightness. Cardiovascular: Negative for chest pain. Gastrointestinal: Negative for abdominal pain, constipation, diarrhea, nausea and vomiting. Genitourinary: Negative for difficulty urinating, dysuria, frequency and vaginal discharge. Musculoskeletal: Negative for back pain and myalgias. Skin: Negative for rash. Neurological: Negative for dizziness, light-headedness, numbness and headaches. Prior to Visit Medications    Medication Sig Taking?  Authorizing Provider   ketoconazole (NIZORAL) 2 % shampoo WASH EVERY OTHER DAY LET SIT FOR 5 MINUTES PRIOR TO RINSING Yes Historical Provider, MD   lidocaine (XYLOCAINE) 2 % jelly APPLY TO THE AFFECTED AREA EVERY DAY FOR 1 DAY PRIOR TO PELVIC EXAMS Yes Historical Provider, MD   nitrofurantoin (MACRODANTIN) 100 MG capsule Take 100 mg by mouth daily Yes Historical Provider, MD   methylphenidate (RITALIN) 20 MG tablet TAKE 1 TABLET BY MOUTH EVERY DAY IN THE MORNING Yes Historical Provider, MD   methylphenidate (RITALIN) 10 MG tablet TAKE 1 TABLET BY MOUTH EVERY DAY IN THE AFTERNOON Yes Historical Provider, MD   cefdinir (OMNICEF) 300 MG capsule Take 1 capsule by mouth 2 times daily for 7 days Yes Dinah Hurt PA-C   hydrOXYzine (ATARAX) 10 MG tablet TAKE 1 TABLET BY MOUTH THREE TIMES DAILY AS NEEDED FOR ANXIETY OR PANIC ATTACKS Yes Historical Provider, MD   primidone (MYSOLINE) 50 MG tablet Take 1 tablet by mouth nightly Yes Michelle Noel DO   esomeprazole (NEXIUM) 40 MG delayed release capsule TAKE 1 CAPSULE BY MOUTH ONCE A DAY Yes Michelle Noel DO   vitamin D (ERGOCALCIFEROL) 1.25 MG (07908 UT) CAPS capsule TAKE 1 CAPSULE BY MOUTH ONCE A WEEK Yes Michelle Noel DO   PARoxetine (PAXIL) 10 MG tablet Take 1 tablet by mouth daily Yes Michelle Noel DO   testosterone cypionate (DEPOTESTOTERONE CYPIONATE) 200 MG/ML injection Inject 50 mg into the muscle once a week. Yes Historical Provider, MD   chlorhexidine (PERIDEX) 0.12 % solution TAKE 15 ML BY MOUTH TWICE DAILY AFTER MEALS *SWISH IN MOUTH FOR 30 SECONDS THEN SPIT OUT* Yes Historical Provider, MD   fluticasone (FLONASE) 50 MCG/ACT nasal spray 2 sprays by Each Nostril route daily Yes Michelle Noel DO   buPROPion (WELLBUTRIN XL) 150 MG extended release tablet Take 150 mg by mouth every morning Yes Historical Provider, MD   triamcinolone (KENALOG) 0.1 % cream Apply topically 2 times daily.   Patient not taking: Reported on 4/18/2022  Michelle Noel DO        No Known Allergies    Past Medical History:   Diagnosis Date    Acid reflux     Anxiety     Depression     Excessive physiologic tremor 3/29/2021    Gender dysphoria in adult 3/29/2021    Sleep disturbance 3/29/2021    Tremor 3/29/2021    Vitamin D deficiency        History reviewed. No pertinent surgical history. Social History     Socioeconomic History    Marital status: Single     Spouse name: Not on file    Number of children: Not on file    Years of education: Not on file    Highest education level: Not on file   Occupational History    Not on file   Tobacco Use    Smoking status: Never Smoker    Smokeless tobacco: Never Used   Vaping Use    Vaping Use: Never used   Substance and Sexual Activity    Alcohol use: Never    Drug use: Not on file     Comment: last used in September 2020    Sexual activity: Yes   Other Topics Concern    Not on file   Social History Narrative    Not on file     Social Determinants of Health     Financial Resource Strain: Medium Risk    Difficulty of Paying Living Expenses: Somewhat hard   Food Insecurity: No Food Insecurity    Worried About Running Out of Food in the Last Year: Never true    Jackelyn of Food in the Last Year: Never true   Transportation Needs:     Lack of Transportation (Medical): Not on file    Lack of Transportation (Non-Medical):  Not on file   Physical Activity:     Days of Exercise per Week: Not on file    Minutes of Exercise per Session: Not on file   Stress:     Feeling of Stress : Not on file   Social Connections:     Frequency of Communication with Friends and Family: Not on file    Frequency of Social Gatherings with Friends and Family: Not on file    Attends Jehovah's witness Services: Not on file    Active Member of Clubs or Organizations: Not on file    Attends Club or Organization Meetings: Not on file    Marital Status: Not on file   Intimate Partner Violence:     Fear of Current or Ex-Partner: Not on file    Emotionally Abused: Not on file    Physically Abused: Not on file    Sexually Abused: Not on file   Housing Stability:     Unable to Pay for Housing in the Last Year: Not on file    Number of Places Lived in the Last Year: Not on file    Unstable Housing in the Last Year: Not on file        Family History   Problem Relation Age of Onset    Hypothyroidism Mother     High Blood Pressure Father     Depression Maternal Uncle     Asthma Paternal Aunt     High Blood Pressure Maternal Grandmother     Hypothyroidism Maternal Grandmother     Cancer Maternal Grandfather     Stroke Paternal Grandmother     Heart Attack Paternal Grandfather     Irritable Bowel Syndrome Sister            Vitals:    04/18/22 1532   BP: 116/70   Pulse: 104   Temp: 98.6 °F (37 °C)   TempSrc: Oral   SpO2: 98%   Weight: 116 lb 6.4 oz (52.8 kg)   Height: 5' (1.524 m)     Estimated body mass index is 22.73 kg/m² as calculated from the following:    Height as of this encounter: 5' (1.524 m). Weight as of this encounter: 116 lb 6.4 oz (52.8 kg).     Most Recent Labs  CBC  Lab Results   Component Value Date    WBC 13.0 04/14/2022    WBC 16.3 02/12/2022    WBC 6.5 12/14/2021    RBC 4.81 04/14/2022    RBC 4.84 02/12/2022    RBC 4.99 12/14/2021    HGB 14.5 04/14/2022    HGB 14.8 02/12/2022    HGB 14.8 12/14/2021    HCT 44.4 04/14/2022    HCT 45.5 02/12/2022    HCT 46.4 12/14/2021    MCV 92.3 04/14/2022    MCV 94.0 02/12/2022    MCV 93.0 12/14/2021     04/14/2022     02/12/2022     12/14/2021      CMP  Lab Results   Component Value Date     04/14/2022     02/12/2022     03/02/2021    K 4.3 04/14/2022    K 4.3 02/12/2022    K 4.7 03/02/2021     04/14/2022     02/12/2022     03/02/2021    CO2 28 04/14/2022    CO2 29 02/12/2022    CO2 24 03/02/2021    ANIONGAP 10 04/14/2022    ANIONGAP 7 02/12/2022    ANIONGAP 10 03/02/2021    GLUCOSE 77 04/14/2022    GLUCOSE 88 02/12/2022    GLUCOSE 86 03/02/2021    BUN 10 04/14/2022    BUN 12 02/12/2022    BUN 9 03/02/2021    CREATININE 0.8 04/14/2022    CREATININE 0.8 02/12/2022    CREATININE 0.8 03/02/2021    LABGLOM >60 04/14/2022 LABGLOM >60 02/12/2022    LABGLOM >60 03/02/2021    GFRAA >60 04/14/2022    GFRAA >60 02/12/2022    GFRAA >60 03/02/2021    CALCIUM 9.6 04/14/2022    CALCIUM 10.0 02/12/2022    CALCIUM 9.1 03/02/2021    PROT 7.2 04/14/2022    PROT 7.5 02/12/2022    PROT 7.3 03/02/2021    LABALBU 4.5 04/14/2022    LABALBU 4.6 02/12/2022    LABALBU 4.5 03/02/2021    BILITOT <0.2 04/14/2022    BILITOT 0.4 02/12/2022    BILITOT 0.3 03/02/2021    ALKPHOS 79 04/14/2022    ALKPHOS 77 02/12/2022    ALKPHOS 50 03/02/2021    AST 15 04/14/2022    AST 15 02/12/2022    AST 16 03/02/2021    ALT 16 04/14/2022    ALT 13 02/12/2022    ALT 20 03/02/2021     A1C  Lab Results   Component Value Date    LABA1C 5.1 02/22/2022     TSH  Lab Results   Component Value Date    TSH 1.440 02/22/2022    TSH 1.990 03/02/2021     LIPID  Lab Results   Component Value Date    CHOL 163 02/22/2022    CHOL 134 03/16/2021    HDL 44 02/22/2022    HDL 44 03/16/2021    LDLCALC 103 02/22/2022    LDLCALC 82 03/16/2021    TRIG 80 02/22/2022    TRIG 38 03/16/2021     VITAMIN D  Lab Results   Component Value Date    VITD25 52 12/14/2021    VITD25 38 02/15/2021     UA  Lab Results   Component Value Date    COLORU Yellow 04/14/2022    COLORU Yellow 02/12/2022    CLARITYU Clear 04/14/2022    CLARITYU CLOUDY 02/12/2022    GLUCOSEU Negative 04/14/2022    GLUCOSEU Negative 02/12/2022    BILIRUBINUR Negative 04/14/2022    BILIRUBINUR Negative 02/12/2022    KETUA Negative 04/14/2022    KETUA Negative 02/12/2022    SPECGRAV 1.025 04/14/2022    SPECGRAV 1.020 02/12/2022    BLOODU Negative 04/14/2022    BLOODU LARGE 02/12/2022    PHUR 7.0 04/14/2022    PHUR 8.0 02/12/2022    PROTEINU Negative 04/14/2022    PROTEINU Negative 02/12/2022    UROBILINOGEN 0.2 04/14/2022    UROBILINOGEN 0.2 02/12/2022    NITRU Negative 04/14/2022    NITRU Negative 02/12/2022    LEUKOCYTESUR TRACE 04/14/2022    LEUKOCYTESUR LARGE 02/12/2022       Physical Exam  Vitals and nursing note reviewed.    Constitutional: Appearance: Normal appearance. HENT:      Head: Normocephalic and atraumatic. Right Ear: External ear normal.      Left Ear: External ear normal.   Eyes:      Extraocular Movements: Extraocular movements intact. Pupils: Pupils are equal, round, and reactive to light. Cardiovascular:      Rate and Rhythm: Normal rate and regular rhythm. Pulses: Normal pulses. Heart sounds: Normal heart sounds. Pulmonary:      Effort: Pulmonary effort is normal.      Breath sounds: Normal breath sounds. Abdominal:      General: Bowel sounds are normal.      Palpations: Abdomen is soft. Musculoskeletal:         General: Normal range of motion. Cervical back: Normal range of motion and neck supple. Skin:     General: Skin is warm and dry. Capillary Refill: Capillary refill takes less than 2 seconds. Neurological:      General: No focal deficit present. Mental Status: He is alert and oriented to person, place, and time. Psychiatric:         Mood and Affect: Mood normal.         Behavior: Behavior normal.         Thought Content: Thought content normal.         ASSESSMENT/PLAN:  Ambar Raya was seen today for abdominal pain. Diagnoses and all orders for this visit:    Gastroesophageal reflux disease without esophagitis    Excessive physiologic tremor    Shortness of breath  -     Full PFT Study With Bronchodilator; Future         As above. Call or go to the nearest ED immediately if symptoms worsen or persist.  Educational materials and/or home exercises printed for patient's review and were included in patient instructions on his/her After Visit Summary and given to patient at the end of visit. Counseled regarding above diagnosis, including possible risks and complications,  especially if left uncontrolled.      Counseled regarding the possible side effects, risks, benefits and alternatives to treatment; patient and/or guardian verbalizes understanding, agrees, feels comfortable with and wishes to proceed with above treatment plan. Advised patient to call with any new medication issues, and read all Rx info from pharmacy to assure aware of all possible risks and side effects of medication before taking. Reviewed age and gender appropriate health screening exams and vaccinations. Advised patient regarding importance of keeping up with recommended health maintenance and to schedule as soon as possible if overdue, as this is important in assessing for undiagnosed pathology, especially cancer, as well as protecting against potentially harmful/life threatening disease. Patient and/or guardian verbalizes understanding and agrees with above counseling, assessment and plan. All questions answered. Return in about 3 months (around 7/18/2022). An  electronic signature was used to authenticate this note. --Saintclair Griffith,  on 4/18/2022 at 4:46 PM    This document was prepared at least partially through the use of voice recognition software. Although effort is taken to assure the accuracy of this document, it is possible that grammatical, syntax,  or spelling errors may occur.

## 2022-04-27 DIAGNOSIS — R25.1 EXCESSIVE PHYSIOLOGIC TREMOR: ICD-10-CM

## 2022-04-27 RX ORDER — PRIMIDONE 50 MG/1
TABLET ORAL
Qty: 30 TABLET | Refills: 3 | Status: SHIPPED
Start: 2022-04-27 | End: 2022-07-11

## 2022-04-27 NOTE — TELEPHONE ENCOUNTER
Last Appointment:  4/18/2022  Future Appointments   Date Time Provider Bonny Clark   5/2/2022 11:30 AM PACO [facility]   5/9/2022  1:00 PM SEYZ PFT STRESS LAB ROOM [facility]YZ PFT [facility]   7/11/2022  2:00 PM TASHA Ventura - CNP [facility] NEURO [facility]   7/18/2022 11:00 AM DO INEZ Molina [facility]

## 2022-05-02 ENCOUNTER — OFFICE VISIT (OUTPATIENT)
Dept: FAMILY MEDICINE CLINIC | Age: 27
End: 2022-05-02
Payer: COMMERCIAL

## 2022-05-02 ENCOUNTER — OFFICE VISIT (OUTPATIENT)
Dept: CHIROPRACTIC MEDICINE | Age: 27
End: 2022-05-02
Payer: COMMERCIAL

## 2022-05-02 VITALS — OXYGEN SATURATION: 100 % | HEART RATE: 84 BPM | TEMPERATURE: 97.9 F

## 2022-05-02 VITALS
TEMPERATURE: 99 F | OXYGEN SATURATION: 96 % | BODY MASS INDEX: 22.07 KG/M2 | HEART RATE: 56 BPM | DIASTOLIC BLOOD PRESSURE: 68 MMHG | SYSTOLIC BLOOD PRESSURE: 102 MMHG | WEIGHT: 113 LBS

## 2022-05-02 DIAGNOSIS — G89.29 CHRONIC BILATERAL LOW BACK PAIN WITHOUT SCIATICA: ICD-10-CM

## 2022-05-02 DIAGNOSIS — N89.8 VAGINAL LESION: ICD-10-CM

## 2022-05-02 DIAGNOSIS — M99.01 SEGMENTAL AND SOMATIC DYSFUNCTION OF CERVICAL REGION: ICD-10-CM

## 2022-05-02 DIAGNOSIS — M54.2 CERVICALGIA: ICD-10-CM

## 2022-05-02 DIAGNOSIS — M53.3 SACROCOCCYGEAL DISORDERS, NOT ELSEWHERE CLASSIFIED: ICD-10-CM

## 2022-05-02 DIAGNOSIS — M99.02 SEGMENTAL AND SOMATIC DYSFUNCTION OF THORACIC REGION: Primary | ICD-10-CM

## 2022-05-02 DIAGNOSIS — M99.05 SEGMENTAL AND SOMATIC DYSFUNCTION OF PELVIC REGION: ICD-10-CM

## 2022-05-02 DIAGNOSIS — M54.50 CHRONIC BILATERAL LOW BACK PAIN WITHOUT SCIATICA: ICD-10-CM

## 2022-05-02 DIAGNOSIS — M99.03 SEGMENTAL AND SOMATIC DYSFUNCTION OF LUMBAR REGION: ICD-10-CM

## 2022-05-02 DIAGNOSIS — R30.0 DYSURIA: Primary | ICD-10-CM

## 2022-05-02 DIAGNOSIS — R30.0 DYSURIA: ICD-10-CM

## 2022-05-02 DIAGNOSIS — N76.0 ACUTE VAGINITIS: ICD-10-CM

## 2022-05-02 DIAGNOSIS — G89.29 CHRONIC BILATERAL THORACIC BACK PAIN: ICD-10-CM

## 2022-05-02 DIAGNOSIS — N30.00 ACUTE CYSTITIS WITHOUT HEMATURIA: ICD-10-CM

## 2022-05-02 DIAGNOSIS — M54.6 CHRONIC BILATERAL THORACIC BACK PAIN: ICD-10-CM

## 2022-05-02 LAB
BILIRUBIN, POC: NORMAL
BLOOD URINE, POC: NORMAL
CLARITY, POC: NORMAL
COLOR, POC: YELLOW
GLUCOSE URINE, POC: NORMAL
KETONES, POC: NORMAL
LEUKOCYTE EST, POC: NORMAL
NITRITE, POC: NORMAL
PH, POC: 6
PROTEIN, POC: NORMAL
SPECIFIC GRAVITY, POC: >1.03
UROBILINOGEN, POC: 0.2

## 2022-05-02 PROCEDURE — 1036F TOBACCO NON-USER: CPT | Performed by: PHYSICIAN ASSISTANT

## 2022-05-02 PROCEDURE — 99214 OFFICE O/P EST MOD 30 MIN: CPT | Performed by: PHYSICIAN ASSISTANT

## 2022-05-02 PROCEDURE — G8427 DOCREV CUR MEDS BY ELIG CLIN: HCPCS | Performed by: PHYSICIAN ASSISTANT

## 2022-05-02 PROCEDURE — G8420 CALC BMI NORM PARAMETERS: HCPCS | Performed by: PHYSICIAN ASSISTANT

## 2022-05-02 PROCEDURE — 98941 CHIROPRACT MANJ 3-4 REGIONS: CPT | Performed by: CHIROPRACTOR

## 2022-05-02 PROCEDURE — 81002 URINALYSIS NONAUTO W/O SCOPE: CPT | Performed by: PHYSICIAN ASSISTANT

## 2022-05-02 PROCEDURE — 99999 PR OFFICE/OUTPT VISIT,PROCEDURE ONLY: CPT | Performed by: CHIROPRACTOR

## 2022-05-02 RX ORDER — FLUCONAZOLE 150 MG/1
TABLET ORAL
Qty: 2 TABLET | Refills: 0 | Status: SHIPPED
Start: 2022-05-02 | End: 2022-07-11

## 2022-05-02 RX ORDER — PIMECROLIMUS 10 MG/G
CREAM TOPICAL
COMMUNITY
Start: 2022-04-25

## 2022-05-02 RX ORDER — NITROFURANTOIN 25; 75 MG/1; MG/1
100 CAPSULE ORAL 2 TIMES DAILY
Qty: 14 CAPSULE | Refills: 0 | Status: SHIPPED | OUTPATIENT
Start: 2022-05-02 | End: 2022-05-09

## 2022-05-02 NOTE — PROGRESS NOTES
22  [facility] : 1995 Sex: adult  Age 23 y.o. Subjective:  Chief Complaint   Patient presents with    Groin Pain     groin very itchy has some skin discoloration         HPI:   Munira Mcallister , 23 y.o. adult presents to express care for evaluation of pain and itching in the groin, skin discoloration    HPI  25-year-old transgender male presents to express care for evaluation of groin pain, itching, irritation. The patient does not have any history of yeast infection. The patient has been with the same partner for 6 years. The patient has no concern for STD. No significant burning with urination. The patient is having some vaginal discharge. The patient is having some irritation and skin discoloration in the area. Does have a history of seborrheic dermatitis. But usually to the eyelid area. The patient is not having any significant back pain or flank pain. ROS:   Unless otherwise stated in this report the patient's positive and negative responses for review of systems for constitutional, eyes, ENT, cardiovascular, respiratory, gastrointestinal, neurological, , musculoskeletal, and integument systems and related systems to the presenting problem are either stated in the history of present illness or were not pertinent or were negative for the symptoms and/or complaints related to the presenting medical problem. Positives and pertinent negatives as per HPI. All others reviewed and are negative. PMH:     Past Medical History:   Diagnosis Date    Acid reflux     Anxiety     Depression     Excessive physiologic tremor 3/29/2021    Gender dysphoria in adult 3/29/2021    Sleep disturbance 3/29/2021    Tremor 3/29/2021    Vitamin D deficiency        History reviewed. No pertinent surgical history.     Family History   Problem Relation Age of Onset    Hypothyroidism Mother     High Blood Pressure Father     Depression Maternal Uncle     Asthma Paternal Aunt  High Blood Pressure Maternal Grandmother     Hypothyroidism Maternal Grandmother     Cancer Maternal Grandfather     Stroke Paternal Grandmother     Heart Attack Paternal Grandfather     Irritable Bowel Syndrome Sister        Medications:     Current Outpatient Medications:     pimecrolimus (ELIDEL) 1 % cream, APPLY TO THE AFFECTED AREA TWICE DAILY, Disp: , Rfl:     VENTOLIN  (90 Base) MCG/ACT inhaler, INHALE 2 PUFFS BY MOUTH EVERY 4 TO 6 HOURS AS NEEDED, Disp: , Rfl:     clotrimazole (LOTRIMIN) 2 % CREA vaginal cream, Place 1 applicator vaginally daily for 3 days, Disp: 21 g, Rfl: 0    fluconazole (DIFLUCAN) 150 MG tablet, Take 1 now and then another at the end of the course of the antibiotic, Disp: 2 tablet, Rfl: 0    nitrofurantoin, macrocrystal-monohydrate, (MACROBID) 100 MG capsule, Take 1 capsule by mouth 2 times daily for 7 days, Disp: 14 capsule, Rfl: 0    primidone (MYSOLINE) 50 MG tablet, TAKE 1 TABLET BY MOUTH EVERY NIGHT, Disp: 30 tablet, Rfl: 3    ketoconazole (NIZORAL) 2 % shampoo, WASH EVERY OTHER DAY LET SIT FOR 5 MINUTES PRIOR TO RINSING, Disp: , Rfl:     lidocaine (XYLOCAINE) 2 % jelly, APPLY TO THE AFFECTED AREA EVERY DAY FOR 1 DAY PRIOR TO PELVIC EXAMS, Disp: , Rfl:     nitrofurantoin (MACRODANTIN) 100 MG capsule, Take 100 mg by mouth daily, Disp: , Rfl:     methylphenidate (RITALIN) 20 MG tablet, TAKE 1 TABLET BY MOUTH EVERY DAY IN THE MORNING, Disp: , Rfl:     methylphenidate (RITALIN) 10 MG tablet, TAKE 1 TABLET BY MOUTH EVERY DAY IN THE AFTERNOON, Disp: , Rfl:     hydrOXYzine (ATARAX) 10 MG tablet, TAKE 1 TABLET BY MOUTH THREE TIMES DAILY AS NEEDED FOR ANXIETY OR PANIC ATTACKS, Disp: , Rfl:     esomeprazole (NEXIUM) 40 MG delayed release capsule, TAKE 1 CAPSULE BY MOUTH ONCE A DAY, Disp: 30 capsule, Rfl: 3    vitamin D (ERGOCALCIFEROL) 1.25 MG (42649 UT) CAPS capsule, TAKE 1 CAPSULE BY MOUTH ONCE A WEEK, Disp: 4 capsule, Rfl: 3    PARoxetine (PAXIL) 10 MG tablet, Take 1 tablet by mouth daily, Disp: 30 tablet, Rfl: 3    triamcinolone (KENALOG) 0.1 % cream, Apply topically 2 times daily. , Disp: 80 g, Rfl: 3    chlorhexidine (PERIDEX) 0.12 % solution, TAKE 15 ML BY MOUTH TWICE DAILY AFTER MEALS *SWISH IN MOUTH FOR 30 SECONDS THEN SPIT OUT*, Disp: , Rfl:     fluticasone (FLONASE) 50 MCG/ACT nasal spray, 2 sprays by Each Nostril route daily, Disp: 3 Bottle, Rfl: 1    buPROPion (WELLBUTRIN XL) 150 MG extended release tablet, Take 150 mg by mouth every morning, Disp: , Rfl:     testosterone cypionate (DEPOTESTOTERONE CYPIONATE) 200 MG/ML injection, Inject 50 mg into the muscle once a week., Disp: , Rfl:     Allergies:   No Known Allergies    Social History:     Social History     Tobacco Use    Smoking status: Never Smoker    Smokeless tobacco: Never Used   Vaping Use    Vaping Use: Never used   Substance Use Topics    Alcohol use: Never    Drug use: Not on file     Comment: last used in September 2020       Patient lives at home. Physical Exam:     Vitals:    05/02/22 1154   BP: 102/68   Pulse: 56   Temp: 99 °F (37.2 °C)   SpO2: 96%   Weight: 113 lb (51.3 kg)       Exam:  Physical Exam  Nurses note and vital signs reviewed and patient is not hypoxic. General: The patient appears well and in no apparent distress. Patient is resting comfortably on cart. Skin: Warm, dry, no pallor noted. There is no rash noted. Head: Normocephalic, atraumatic  Eye: Normal conjunctiva  Ears, Nose, Mouth, and Throat: Wearing a mask  Cardiovascular: Regular Rate and Rhythm  Respiratory: Patient is in no distress, no accessory muscle use, lungs are clear to auscultation, no wheezing, rales or rhonchi  Back: non-tender, no CVA tenderness bilaterally to percussion. GI: Normal bowel sounds, no tenderness to palpation, no masses appreciated. No rebound, guarding, or rigidity noted.   : Pelvic exam was completed with nursing staff at bedside for entire duration of the exam, Kaushik Humphrey MA  Speculum exam showed erythematous and slightly swollen labia, no evidence of vesicular lesion, there was evidence of a hypopigmented lesion that is approximately 3 mm in diameter to the right external labia, there was evidence of white/clear discharge in vaginal vault. There was no evidence of blood noted in the vaginal vault. Patient's os was closed. Bimanual exam showed no cervical motion tenderness, no adnexal tenderness, no masses were appreciated. Os is closed. Musculoskeletal: The patient has no evidence of calf tenderness, no pitting edema, symmetrical pulses noted bilaterally  Neurological: A&O x4, normal speech  Psychiatric: Cooperative      Testing:     Results for orders placed or performed in visit on 05/02/22   POCT Urinalysis no Micro   Result Value Ref Range    Color, UA yellow     Clarity, UA cloudy     Glucose, UA POC neg     Bilirubin, UA neg     Ketones, UA neg     Spec Grav, UA >1.030     Blood, UA POC neg     pH, UA 6.0     Protein, UA POC neg     Urobilinogen, UA 0.2     Leukocytes, UA small     Nitrite, UA neg          Medical Decision Making:     Vital signs reviewed    Past medical history reviewed. Allergies reviewed. Medications reviewed. Patient on arrival does not appear to be in any apparent distress or discomfort. The patient has been seen and evaluated. The patient does not appear to be toxic or lethargic. Pelvic exam was performed. Pelvic cultures obtained. The patient's signs symptoms seem to be consistent with a vulvovaginitis. The patient will be treated with Diflucan and clotrimazole. Urinalysis did show evidence of small leukocytes. We will set up a urine culture. The patient will be treated with Macrobid. The patient also has a genital lesion that she needs to follow-up with her OB/GYN and have further investigations for her.     The patient is to return to express care or go directly to the emergency department should any of the signs or symptoms worsen. The patient is to followup with primary care physician in 2-3 days for repeat evaluation. The patient has no other questions or concerns at this time the patient will be discharged home. Clinical Impression:   Clyde Roberts was seen today for groin pain. Diagnoses and all orders for this visit:    Dysuria  -     POCT Urinalysis no Micro  -     Culture, Urine; Future  -     C.TRACHOMATIS N.GONORRHOEAE DNA; Future  -     Culture, Genital; Future  -     TRICHOMONAS SCREEN; Future    Acute cystitis without hematuria    Acute vaginitis    Vaginal lesion    Other orders  -     clotrimazole (LOTRIMIN) 2 % CREA vaginal cream; Place 1 applicator vaginally daily for 3 days  -     fluconazole (DIFLUCAN) 150 MG tablet; Take 1 now and then another at the end of the course of the antibiotic  -     nitrofurantoin, macrocrystal-monohydrate, (MACROBID) 100 MG capsule; Take 1 capsule by mouth 2 times daily for 7 days        The patient is to call for any concerns or return if any of the signs or symptoms worsen. The patient is to follow-up with PCP in the next 2-3 days for repeat evaluation repeat assessment or go directly to the emergency department.      SIGNATURE: Reinier Fernandez III, PA-C

## 2022-05-02 NOTE — PROGRESS NOTES
Patient is here for a follow up on neck and back pain. Patient states neck and back have been feeling alright.  Mattie Gil DO  Electronically signed by Mellissa Novak LPN on 2/6/1541 at 81:72 AM

## 2022-05-02 NOTE — PROGRESS NOTES
22  Hugo Barry : 1995 Sex: adult  Age: 23 y.o. Chief Complaint   Patient presents with    Back Pain    Neck Pain       HPI:   Pain has worsened. On average, pain is perceived as mild (1-3  pain scale). Patient denies new numbness, new weakness, new tingling  Thought he had a kidney stone recently, did go to ER. Nothing showed on CAT scan.   He is planning on following up with his gastroenterologist      Current Outpatient Medications:     pimecrolimus (ELIDEL) 1 % cream, APPLY TO THE AFFECTED AREA TWICE DAILY, Disp: , Rfl:     VENTOLIN  (90 Base) MCG/ACT inhaler, INHALE 2 PUFFS BY MOUTH EVERY 4 TO 6 HOURS AS NEEDED, Disp: , Rfl:     primidone (MYSOLINE) 50 MG tablet, TAKE 1 TABLET BY MOUTH EVERY NIGHT, Disp: 30 tablet, Rfl: 3    ketoconazole (NIZORAL) 2 % shampoo, WASH EVERY OTHER DAY LET SIT FOR 5 MINUTES PRIOR TO RINSING, Disp: , Rfl:     lidocaine (XYLOCAINE) 2 % jelly, APPLY TO THE AFFECTED AREA EVERY DAY FOR 1 DAY PRIOR TO PELVIC EXAMS, Disp: , Rfl:     nitrofurantoin (MACRODANTIN) 100 MG capsule, Take 100 mg by mouth daily, Disp: , Rfl:     methylphenidate (RITALIN) 20 MG tablet, TAKE 1 TABLET BY MOUTH EVERY DAY IN THE MORNING, Disp: , Rfl:     methylphenidate (RITALIN) 10 MG tablet, TAKE 1 TABLET BY MOUTH EVERY DAY IN THE AFTERNOON, Disp: , Rfl:     hydrOXYzine (ATARAX) 10 MG tablet, TAKE 1 TABLET BY MOUTH THREE TIMES DAILY AS NEEDED FOR ANXIETY OR PANIC ATTACKS, Disp: , Rfl:     esomeprazole (NEXIUM) 40 MG delayed release capsule, TAKE 1 CAPSULE BY MOUTH ONCE A DAY, Disp: 30 capsule, Rfl: 3    vitamin D (ERGOCALCIFEROL) 1.25 MG (27545 UT) CAPS capsule, TAKE 1 CAPSULE BY MOUTH ONCE A WEEK, Disp: 4 capsule, Rfl: 3    PARoxetine (PAXIL) 10 MG tablet, Take 1 tablet by mouth daily, Disp: 30 tablet, Rfl: 3    chlorhexidine (PERIDEX) 0.12 % solution, TAKE 15 ML BY MOUTH TWICE DAILY AFTER MEALS *SWISH IN MOUTH FOR 30 SECONDS THEN SPIT OUT*, Disp: , Rfl:     fluticasone (FLONASE) 50 MCG/ACT nasal spray, 2 sprays by Each Nostril route daily, Disp: 3 Bottle, Rfl: 1    buPROPion (WELLBUTRIN XL) 150 MG extended release tablet, Take 150 mg by mouth every morning, Disp: , Rfl:     triamcinolone (KENALOG) 0.1 % cream, Apply topically 2 times daily. , Disp: 80 g, Rfl: 3    testosterone cypionate (DEPOTESTOTERONE CYPIONATE) 200 MG/ML injection, Inject 50 mg into the muscle once a week., Disp: , Rfl:     Exam:   Vitals:    05/02/22 1132   Pulse: 84   Temp: 97.9 °F (36.6 °C)   SpO2: 100%       There are hypertonic and tender fibers noted today in the cervical, thoracic, lumbar paraspinal muscles. Negative Gonzales punch. There is reproducible discomfort with palpation over the thoracolumbar paraspinal muscles today. Joint fixation is noted with motion screening at T12-L1, L5-S1, bilateral SI joints, T4-6, C5-6. Saira Loepz was seen today for back pain and neck pain. Diagnoses and all orders for this visit:    Segmental and somatic dysfunction of thoracic region    Segmental and somatic dysfunction of lumbar region    Segmental and somatic dysfunction of pelvic region    Chronic bilateral low back pain without sciatica    Chronic bilateral thoracic back pain    Sacrococcygeal disorders, not elsewhere classified    Segmental and somatic dysfunction of cervical region    Cervicalgia        Treatment Plan: Continued with treatment today to consist of diversified manipulation to the affected segments in the cervical, thoracic, lumbar and SI regions. Tolerated well.   He can follow-up with me as needed for further care      Seen By:  Brandy Mulligan

## 2022-05-05 LAB
C TRACH DNA GENITAL QL NAA+PROBE: NEGATIVE
N. GONORRHOEAE DNA: NEGATIVE
ORGANISM: ABNORMAL
SOURCE: NORMAL
URINE CULTURE, ROUTINE: ABNORMAL

## 2022-05-05 RX ORDER — LEVOFLOXACIN 500 MG/1
500 TABLET, FILM COATED ORAL DAILY
Qty: 10 TABLET | Refills: 0 | Status: SHIPPED | OUTPATIENT
Start: 2022-05-05 | End: 2022-05-15

## 2022-05-06 LAB — GENITAL CULTURE, ROUTINE: NORMAL

## 2022-05-08 LAB — CULTURE, TRICHOMONAS: NORMAL

## 2022-05-09 ENCOUNTER — HOSPITAL ENCOUNTER (OUTPATIENT)
Dept: PULMONOLOGY | Age: 27
Discharge: HOME | End: 2022-05-09
Payer: COMMERCIAL

## 2022-05-09 DIAGNOSIS — R06.02 SHORTNESS OF BREATH: ICD-10-CM

## 2022-05-09 PROCEDURE — 94060 EVALUATION OF WHEEZING: CPT

## 2022-05-09 PROCEDURE — 94726 PLETHYSMOGRAPHY LUNG VOLUMES: CPT

## 2022-05-09 PROCEDURE — 94729 DIFFUSING CAPACITY: CPT

## 2022-05-16 ENCOUNTER — OFFICE VISIT (OUTPATIENT)
Dept: CHIROPRACTIC MEDICINE | Age: 27
End: 2022-05-16
Payer: COMMERCIAL

## 2022-05-16 VITALS — TEMPERATURE: 98.2 F | HEIGHT: 60 IN | WEIGHT: 116 LBS | BODY MASS INDEX: 22.78 KG/M2 | HEART RATE: 79 BPM

## 2022-05-16 DIAGNOSIS — M54.2 CERVICALGIA: ICD-10-CM

## 2022-05-16 DIAGNOSIS — M99.01 SEGMENTAL AND SOMATIC DYSFUNCTION OF CERVICAL REGION: ICD-10-CM

## 2022-05-16 DIAGNOSIS — G89.29 CHRONIC BILATERAL THORACIC BACK PAIN: ICD-10-CM

## 2022-05-16 DIAGNOSIS — M54.6 CHRONIC BILATERAL THORACIC BACK PAIN: ICD-10-CM

## 2022-05-16 DIAGNOSIS — M99.02 SEGMENTAL AND SOMATIC DYSFUNCTION OF THORACIC REGION: Primary | ICD-10-CM

## 2022-05-16 PROCEDURE — 99999 PR OFFICE/OUTPT VISIT,PROCEDURE ONLY: CPT | Performed by: CHIROPRACTOR

## 2022-05-16 PROCEDURE — 98940 CHIROPRACT MANJ 1-2 REGIONS: CPT | Performed by: CHIROPRACTOR

## 2022-05-16 NOTE — PROGRESS NOTES
22  Indira Carrier : 1995 Sex: adult  Age: 23 y.o. Chief Complaint   Patient presents with    Back Pain     whole back    Neck Pain       HPI:   Pain has slightly improved. On average, pain is perceived as mild (0-1 pain scale). Patient denies new numbness, new weakness, new tingling. He states that his backpack may be bothering him, as he has to carry a lot of equipment for school.   No new issues otherwise      Current Outpatient Medications:     pimecrolimus (ELIDEL) 1 % cream, APPLY TO THE AFFECTED AREA TWICE DAILY, Disp: , Rfl:     VENTOLIN  (90 Base) MCG/ACT inhaler, INHALE 2 PUFFS BY MOUTH EVERY 4 TO 6 HOURS AS NEEDED, Disp: , Rfl:     fluconazole (DIFLUCAN) 150 MG tablet, Take 1 now and then another at the end of the course of the antibiotic, Disp: 2 tablet, Rfl: 0    primidone (MYSOLINE) 50 MG tablet, TAKE 1 TABLET BY MOUTH EVERY NIGHT, Disp: 30 tablet, Rfl: 3    ketoconazole (NIZORAL) 2 % shampoo, WASH EVERY OTHER DAY LET SIT FOR 5 MINUTES PRIOR TO RINSING, Disp: , Rfl:     lidocaine (XYLOCAINE) 2 % jelly, APPLY TO THE AFFECTED AREA EVERY DAY FOR 1 DAY PRIOR TO PELVIC EXAMS, Disp: , Rfl:     nitrofurantoin (MACRODANTIN) 100 MG capsule, Take 100 mg by mouth daily, Disp: , Rfl:     methylphenidate (RITALIN) 20 MG tablet, TAKE 1 TABLET BY MOUTH EVERY DAY IN THE MORNING, Disp: , Rfl:     methylphenidate (RITALIN) 10 MG tablet, TAKE 1 TABLET BY MOUTH EVERY DAY IN THE AFTERNOON, Disp: , Rfl:     hydrOXYzine (ATARAX) 10 MG tablet, TAKE 1 TABLET BY MOUTH THREE TIMES DAILY AS NEEDED FOR ANXIETY OR PANIC ATTACKS, Disp: , Rfl:     esomeprazole (NEXIUM) 40 MG delayed release capsule, TAKE 1 CAPSULE BY MOUTH ONCE A DAY, Disp: 30 capsule, Rfl: 3    vitamin D (ERGOCALCIFEROL) 1.25 MG (27147 UT) CAPS capsule, TAKE 1 CAPSULE BY MOUTH ONCE A WEEK, Disp: 4 capsule, Rfl: 3    PARoxetine (PAXIL) 10 MG tablet, Take 1 tablet by mouth daily, Disp: 30 tablet, Rfl: 3    triamcinolone (KENALOG) 0.1 % cream, Apply topically 2 times daily. , Disp: 80 g, Rfl: 3    chlorhexidine (PERIDEX) 0.12 % solution, TAKE 15 ML BY MOUTH TWICE DAILY AFTER MEALS *SWISH IN MOUTH FOR 30 SECONDS THEN SPIT OUT*, Disp: , Rfl:     fluticasone (FLONASE) 50 MCG/ACT nasal spray, 2 sprays by Each Nostril route daily, Disp: 3 Bottle, Rfl: 1    buPROPion (WELLBUTRIN XL) 150 MG extended release tablet, Take 150 mg by mouth every morning, Disp: , Rfl:     testosterone cypionate (DEPOTESTOTERONE CYPIONATE) 200 MG/ML injection, Inject 50 mg into the muscle once a week., Disp: , Rfl:     Exam:   Vitals:    05/16/22 1511   Pulse: 79   Temp: 98.2 °F (36.8 °C)     Trigger point in the bilateral trapezius today. No midline pain. There are hypertonic and tender fibers noted today in the groin thoracic paraspinal muscles. Joint fixation is noted with motion screening at C5-6, T3-6. Jhoana Montes De Oca was seen today for back pain and neck pain. Diagnoses and all orders for this visit:    Segmental and somatic dysfunction of thoracic region    Chronic bilateral thoracic back pain    Segmental and somatic dysfunction of cervical region    Cervicalgia        Treatment Plan:  Diversified manipulation to the above-listed segments in the cervical spine and thoracic spine. Tolerated well. Noted relief following care.   I will see him back in 3 weeks or as needed for care    Seen By:  Omid Walter

## 2022-05-18 PROCEDURE — 94729 DIFFUSING CAPACITY: CPT | Performed by: INTERNAL MEDICINE

## 2022-05-18 PROCEDURE — 94726 PLETHYSMOGRAPHY LUNG VOLUMES: CPT | Performed by: INTERNAL MEDICINE

## 2022-05-18 NOTE — PROCEDURES
[address]                  [address] [facility]  [facility]                               PULMONARY FUNCTION    PATIENT NAME: Nettie Bar                   :        1995  MED REC NO:   [id number]                            ROOM:  ACCOUNT NO:   [de-identified]                           ADMIT DATE: 2022  PROVIDER:     Fidel Baez DO    DATE OF PROCEDURE:  2022    REFERRING PROVIDER:  Isauro Morales MD    HEIGHT:  60 inches. WEIGHT:  113 pounds. DIAGNOSES:  1. Shortness of breath, short of breath when carrying object for a  length of time after a day at a zoo, having to climb up a part of flight  of steps,wheezes while sleeping but tested with sleep study and  has no sleep apnea. 2.  Dyspnea, no dyspnea. TOBACCO USE:  Never smoked. SPIROMETRY:  FVC is 2.97 liters which is 90% of predicted. FEV1 is 2.75  liters which is 96% of predicted. FEV1/FVC ratio is 93. There is no  significant bronchodilator change. MVV is 62 which is 60% of predicted. LUNG VOLUMES:  FVC is 2.84 liters which is 86% of predicted. RV is 1.10  liters which is 99% of predicted. Total lung capacity is 3.94 liters  which is 88% of predicted. DIFFUSION:  Diffusion is 137% of predicted. FLOW VOLUME LOOP:  Flow-volume loop is essentially normal.    OVERALL INTERPRETATION:  There is no obstruction, no restriction. MVV  is slightly decreased, however this may be due to difficulty with  testing versus neuromuscular weakness versus deconditioning. Please  clinically correlate.         Anahi Sears DO    D: 2022 8:53:56       T: 2022 10:04:37     /[email or web address]  Job#: [id number]     Doc#: [id number]    CC:

## 2022-05-19 PROCEDURE — 94060 EVALUATION OF WHEEZING: CPT | Performed by: INTERNAL MEDICINE

## 2022-05-19 PROCEDURE — 94729 DIFFUSING CAPACITY: CPT | Performed by: INTERNAL MEDICINE

## 2022-05-19 PROCEDURE — 94726 PLETHYSMOGRAPHY LUNG VOLUMES: CPT | Performed by: INTERNAL MEDICINE

## 2022-06-06 ENCOUNTER — OFFICE VISIT (OUTPATIENT)
Dept: CHIROPRACTIC MEDICINE | Age: 27
End: 2022-06-06
Payer: COMMERCIAL

## 2022-06-06 VITALS — TEMPERATURE: 98.2 F | OXYGEN SATURATION: 100 % | HEART RATE: 90 BPM

## 2022-06-06 DIAGNOSIS — M99.05 SEGMENTAL AND SOMATIC DYSFUNCTION OF PELVIC REGION: ICD-10-CM

## 2022-06-06 DIAGNOSIS — M54.2 CERVICALGIA: ICD-10-CM

## 2022-06-06 DIAGNOSIS — M54.50 CHRONIC BILATERAL LOW BACK PAIN WITHOUT SCIATICA: ICD-10-CM

## 2022-06-06 DIAGNOSIS — M99.01 SEGMENTAL AND SOMATIC DYSFUNCTION OF CERVICAL REGION: Primary | ICD-10-CM

## 2022-06-06 DIAGNOSIS — M99.03 SEGMENTAL AND SOMATIC DYSFUNCTION OF LUMBAR REGION: ICD-10-CM

## 2022-06-06 DIAGNOSIS — M53.3 SACROCOCCYGEAL DISORDERS, NOT ELSEWHERE CLASSIFIED: ICD-10-CM

## 2022-06-06 DIAGNOSIS — G89.29 CHRONIC BILATERAL LOW BACK PAIN WITHOUT SCIATICA: ICD-10-CM

## 2022-06-06 PROCEDURE — 99999 PR OFFICE/OUTPT VISIT,PROCEDURE ONLY: CPT | Performed by: CHIROPRACTOR

## 2022-06-06 PROCEDURE — 98941 CHIROPRACT MANJ 3-4 REGIONS: CPT | Performed by: CHIROPRACTOR

## 2022-06-06 NOTE — PROGRESS NOTES
Patient is here for a follow up with neck and back pain. Patient states they are feeling alright and tense.  Jermain Bello DO  Electronically signed by Claire Kearney LPN on 7/8/0431 at 77:46 AM

## 2022-06-06 NOTE — PROGRESS NOTES
22  Taiwo Hassan : 1995 Sex: adult  Age: 23 y.o. Chief Complaint   Patient presents with    Back Pain    Neck Pain       HPI:   Pain has worsened slightly. Describes as irritation or tightness, not really a pain. Patient denies new numbness, new weakness, new tingling  He does note that between visits when his low back and groin get tight he will have his girlfriend do some stretching with him where he does some himself. Will get relief and sometimes an occasional \"crack\".     Current Outpatient Medications:     pimecrolimus (ELIDEL) 1 % cream, APPLY TO THE AFFECTED AREA TWICE DAILY, Disp: , Rfl:     VENTOLIN  (90 Base) MCG/ACT inhaler, INHALE 2 PUFFS BY MOUTH EVERY 4 TO 6 HOURS AS NEEDED, Disp: , Rfl:     fluconazole (DIFLUCAN) 150 MG tablet, Take 1 now and then another at the end of the course of the antibiotic, Disp: 2 tablet, Rfl: 0    primidone (MYSOLINE) 50 MG tablet, TAKE 1 TABLET BY MOUTH EVERY NIGHT, Disp: 30 tablet, Rfl: 3    ketoconazole (NIZORAL) 2 % shampoo, WASH EVERY OTHER DAY LET SIT FOR 5 MINUTES PRIOR TO RINSING, Disp: , Rfl:     lidocaine (XYLOCAINE) 2 % jelly, APPLY TO THE AFFECTED AREA EVERY DAY FOR 1 DAY PRIOR TO PELVIC EXAMS, Disp: , Rfl:     nitrofurantoin (MACRODANTIN) 100 MG capsule, Take 100 mg by mouth daily, Disp: , Rfl:     methylphenidate (RITALIN) 20 MG tablet, TAKE 1 TABLET BY MOUTH EVERY DAY IN THE MORNING, Disp: , Rfl:     methylphenidate (RITALIN) 10 MG tablet, TAKE 1 TABLET BY MOUTH EVERY DAY IN THE AFTERNOON, Disp: , Rfl:     hydrOXYzine (ATARAX) 10 MG tablet, TAKE 1 TABLET BY MOUTH THREE TIMES DAILY AS NEEDED FOR ANXIETY OR PANIC ATTACKS, Disp: , Rfl:     esomeprazole (NEXIUM) 40 MG delayed release capsule, TAKE 1 CAPSULE BY MOUTH ONCE A DAY, Disp: 30 capsule, Rfl: 3    vitamin D (ERGOCALCIFEROL) 1.25 MG (14952 UT) CAPS capsule, TAKE 1 CAPSULE BY MOUTH ONCE A WEEK, Disp: 4 capsule, Rfl: 3    PARoxetine (PAXIL) 10 MG tablet, Take 1 tablet by mouth daily, Disp: 30 tablet, Rfl: 3    triamcinolone (KENALOG) 0.1 % cream, Apply topically 2 times daily. , Disp: 80 g, Rfl: 3    chlorhexidine (PERIDEX) 0.12 % solution, TAKE 15 ML BY MOUTH TWICE DAILY AFTER MEALS *SWISH IN MOUTH FOR 30 SECONDS THEN SPIT OUT*, Disp: , Rfl:     fluticasone (FLONASE) 50 MCG/ACT nasal spray, 2 sprays by Each Nostril route daily, Disp: 3 Bottle, Rfl: 1    buPROPion (WELLBUTRIN XL) 150 MG extended release tablet, Take 150 mg by mouth every morning, Disp: , Rfl:     testosterone cypionate (DEPOTESTOTERONE CYPIONATE) 200 MG/ML injection, Inject 50 mg into the muscle once a week., Disp: , Rfl:     Exam:   Vitals:    06/06/22 1103   Pulse: 90   Temp: 98.2 °F (36.8 °C)   SpO2: 100%     There are some limited cervical rotation today. No midline pain. There are hypertonic and tender fibers noted today in the cervical and lumbar paraspinal muscles. Joint fixation is noted with motion screening at L4-S1, bilateral SI joints, C2-3. Fidencio Joiner was seen today for back pain and neck pain. Diagnoses and all orders for this visit:    Segmental and somatic dysfunction of cervical region    Segmental and somatic dysfunction of lumbar region    Segmental and somatic dysfunction of pelvic region    Cervicalgia    Sacrococcygeal disorders, not elsewhere classified    Chronic bilateral low back pain without sciatica        Treatment Plan:  Diversified manipulation to the above-listed segments in the cervical spine and lumbar spine, Si jts. Noted relief following care. I will see him back in 2 weeks or as needed for care. Reviewed some home stretches as well- Frog, 1/2 kneeling adductor to try.            Seen By:  Praveena Apodaca DC

## 2022-06-20 ENCOUNTER — OFFICE VISIT (OUTPATIENT)
Dept: CHIROPRACTIC MEDICINE | Age: 27
End: 2022-06-20
Payer: COMMERCIAL

## 2022-06-20 VITALS — TEMPERATURE: 97.8 F | OXYGEN SATURATION: 99 % | HEART RATE: 90 BPM

## 2022-06-20 DIAGNOSIS — M53.3 SACROCOCCYGEAL DISORDERS, NOT ELSEWHERE CLASSIFIED: ICD-10-CM

## 2022-06-20 DIAGNOSIS — M99.02 SEGMENTAL AND SOMATIC DYSFUNCTION OF THORACIC REGION: ICD-10-CM

## 2022-06-20 DIAGNOSIS — M99.03 SEGMENTAL AND SOMATIC DYSFUNCTION OF LUMBAR REGION: ICD-10-CM

## 2022-06-20 DIAGNOSIS — M99.05 SEGMENTAL AND SOMATIC DYSFUNCTION OF PELVIC REGION: ICD-10-CM

## 2022-06-20 DIAGNOSIS — M54.6 CHRONIC BILATERAL THORACIC BACK PAIN: ICD-10-CM

## 2022-06-20 DIAGNOSIS — M54.50 CHRONIC BILATERAL LOW BACK PAIN WITHOUT SCIATICA: ICD-10-CM

## 2022-06-20 DIAGNOSIS — G89.29 CHRONIC BILATERAL THORACIC BACK PAIN: ICD-10-CM

## 2022-06-20 DIAGNOSIS — G89.29 CHRONIC BILATERAL LOW BACK PAIN WITHOUT SCIATICA: ICD-10-CM

## 2022-06-20 DIAGNOSIS — M54.2 CERVICALGIA: ICD-10-CM

## 2022-06-20 DIAGNOSIS — M99.01 SEGMENTAL AND SOMATIC DYSFUNCTION OF CERVICAL REGION: Primary | ICD-10-CM

## 2022-06-20 PROCEDURE — 98941 CHIROPRACT MANJ 3-4 REGIONS: CPT | Performed by: CHIROPRACTOR

## 2022-06-20 PROCEDURE — 99999 PR OFFICE/OUTPT VISIT,PROCEDURE ONLY: CPT | Performed by: CHIROPRACTOR

## 2022-06-20 NOTE — PROGRESS NOTES
Patient is here for a follow up. Patient states they are sore and in pain, in the neck area.  Florencia Arellano DO  Electronically signed by Mike Perez LPN on 3/23/4491 at 19:47 AM

## 2022-06-20 NOTE — PROGRESS NOTES
22  Dietrich Gowers : 1995 Sex: adult  Age: 23 y.o. Chief Complaint   Patient presents with    Back Pain    Neck Pain       HPI:   Pain has worsened. On average, pain is perceived as moderate (4-6 pain scale). Patient denies new numbness, new weakness, new tingling  Heavy work backpack - so its been bothering neck/shoulder area, and lower back too. No new injuries.       Current Outpatient Medications:     pimecrolimus (ELIDEL) 1 % cream, APPLY TO THE AFFECTED AREA TWICE DAILY, Disp: , Rfl:     VENTOLIN  (90 Base) MCG/ACT inhaler, INHALE 2 PUFFS BY MOUTH EVERY 4 TO 6 HOURS AS NEEDED, Disp: , Rfl:     fluconazole (DIFLUCAN) 150 MG tablet, Take 1 now and then another at the end of the course of the antibiotic, Disp: 2 tablet, Rfl: 0    primidone (MYSOLINE) 50 MG tablet, TAKE 1 TABLET BY MOUTH EVERY NIGHT, Disp: 30 tablet, Rfl: 3    ketoconazole (NIZORAL) 2 % shampoo, WASH EVERY OTHER DAY LET SIT FOR 5 MINUTES PRIOR TO RINSING, Disp: , Rfl:     lidocaine (XYLOCAINE) 2 % jelly, APPLY TO THE AFFECTED AREA EVERY DAY FOR 1 DAY PRIOR TO PELVIC EXAMS, Disp: , Rfl:     nitrofurantoin (MACRODANTIN) 100 MG capsule, Take 100 mg by mouth daily, Disp: , Rfl:     methylphenidate (RITALIN) 20 MG tablet, TAKE 1 TABLET BY MOUTH EVERY DAY IN THE MORNING, Disp: , Rfl:     methylphenidate (RITALIN) 10 MG tablet, TAKE 1 TABLET BY MOUTH EVERY DAY IN THE AFTERNOON, Disp: , Rfl:     hydrOXYzine (ATARAX) 10 MG tablet, TAKE 1 TABLET BY MOUTH THREE TIMES DAILY AS NEEDED FOR ANXIETY OR PANIC ATTACKS, Disp: , Rfl:     esomeprazole (NEXIUM) 40 MG delayed release capsule, TAKE 1 CAPSULE BY MOUTH ONCE A DAY, Disp: 30 capsule, Rfl: 3    vitamin D (ERGOCALCIFEROL) 1.25 MG (12791 UT) CAPS capsule, TAKE 1 CAPSULE BY MOUTH ONCE A WEEK, Disp: 4 capsule, Rfl: 3    PARoxetine (PAXIL) 10 MG tablet, Take 1 tablet by mouth daily, Disp: 30 tablet, Rfl: 3    triamcinolone (KENALOG) 0.1 % cream, Apply topically 2 times daily., Disp: 80 g, Rfl: 3    chlorhexidine (PERIDEX) 0.12 % solution, TAKE 15 ML BY MOUTH TWICE DAILY AFTER MEALS *SWISH IN MOUTH FOR 30 SECONDS THEN SPIT OUT*, Disp: , Rfl:     fluticasone (FLONASE) 50 MCG/ACT nasal spray, 2 sprays by Each Nostril route daily, Disp: 3 Bottle, Rfl: 1    buPROPion (WELLBUTRIN XL) 150 MG extended release tablet, Take 150 mg by mouth every morning, Disp: , Rfl:     testosterone cypionate (DEPOTESTOTERONE CYPIONATE) 200 MG/ML injection, Inject 50 mg into the muscle once a week., Disp: , Rfl:     Exam:   Vitals:    06/20/22 1128   Pulse: 90   Temp: 97.8 °F (36.6 °C)   SpO2: 99%       There are hypertonic and tender fibers noted with palpation in the paraspinal muscles of the cervical, thoracic, lumbar region. Joint fixation is noted with motion screening at C4-6, T3-6, L4-S1 and bilateral SI joints. Ambar Raya was seen today for back pain and neck pain. Diagnoses and all orders for this visit:    Segmental and somatic dysfunction of cervical region    Segmental and somatic dysfunction of lumbar region    Segmental and somatic dysfunction of pelvic region    Cervicalgia    Sacrococcygeal disorders, not elsewhere classified    Chronic bilateral low back pain without sciatica    Segmental and somatic dysfunction of thoracic region    Chronic bilateral thoracic back pain        Treatment Plan:  Diversified manipulation to the above-listed segments in the cervical spine, thoracic spine, lumbar spine and SI joint. Tolerated well.   Continue with HEP- exercises on AVS.  Follow-up 2 weeks      Seen By:  Dayanara Paz DC

## 2022-06-20 NOTE — PATIENT INSTRUCTIONS
Patient Education        Low Back Pain: Exercises  Introduction  Here are some examples of exercises for you to try. The exercises may be suggested for a condition or for rehabilitation. Start each exercise slowly. Ease off the exercises if you start to have pain. You will be told when to start these exercises and which ones will work bestfor you. How to do the exercises  Press-up    1. Lie on your stomach, supporting your body with your forearms. 2. Press your elbows down into the floor to raise your upper back. As you do this, relax your stomach muscles and allow your back to arch without using your back muscles. As your press up, do not let your hips or pelvis come off the floor. 3. Hold for 15 to 30 seconds, then relax. 4. Repeat 2 to 4 times. Alternate arm and leg (bird dog) exercise    Do this exercise slowly. Try to keep your body straight at all times, and donot let one hip drop lower than the other. 1. Start on the floor, on your hands and knees. 2. Tighten your belly muscles. 3. Raise one leg off the floor, and hold it straight out behind you. Be careful not to let your hip drop down, because that will twist your trunk. 4. Hold for about 6 seconds, then lower your leg and switch to the other leg. 5. Repeat 8 to 12 times on each leg. 6. Over time, work up to holding for 10 to 30 seconds each time. 7. If you feel stable and secure with your leg raised, try raising the opposite arm straight out in front of you at the same time. Knee-to-chest exercise    1. Lie on your back with your knees bent and your feet flat on the floor. 2. Bring one knee to your chest, keeping the other foot flat on the floor (or keeping the other leg straight, whichever feels better on your lower back). 3. Keep your lower back pressed to the floor. Hold for at least 15 to 30 seconds. 4. Relax, and lower the knee to the starting position. 5. Repeat with the other leg. Repeat 2 to 4 times with each leg.   6. To get more stretch, put your other leg flat on the floor while pulling your knee to your chest.  Curl-ups    1. Lie on the floor on your back with your knees bent at a 90-degree angle. Your feet should be flat on the floor, about 12 inches from your buttocks. 2. Cross your arms over your chest. If this bothers your neck, try putting your hands behind your neck (not your head), with your elbows spread apart. 3. Slowly tighten your belly muscles and raise your shoulder blades off the floor. 4. Keep your head in line with your body, and do not press your chin to your chest.  5. Hold this position for 1 or 2 seconds, then slowly lower yourself back down to the floor. 6. Repeat 8 to 12 times. Pelvic tilt exercise    1. Lie on your back with your knees bent. 2. \"Brace\" your stomach. This means to tighten your muscles by pulling in and imagining your belly button moving toward your spine. You should feel like your back is pressing to the floor and your hips and pelvis are rocking back. 3. Hold for about 6 seconds while you breathe smoothly. 4. Repeat 8 to 12 times. Heel dig bridging    1. Lie on your back with both knees bent and your ankles bent so that only your heels are digging into the floor. Your knees should be bent about 90 degrees. 2. Then push your heels into the floor, squeeze your buttocks, and lift your hips off the floor until your shoulders, hips, and knees are all in a straight line. 3. Hold for about 6 seconds as you continue to breathe normally, and then slowly lower your hips back down to the floor and rest for up to 10 seconds. 4. Do 8 to 12 repetitions. Hamstring stretch in doorway    1. Lie on your back in a doorway, with one leg through the open door. 2. Slide your leg up the wall to straighten your knee. You should feel a gentle stretch down the back of your leg. 3. Hold the stretch for at least 15 to 30 seconds. Do not arch your back, point your toes, or bend either knee.  Keep one heel touching the floor and the other heel touching the wall. 4. Repeat with your other leg. 5. Do 2 to 4 times for each leg. Hip flexor stretch    1. Kneel on the floor with one knee bent and one leg behind you. Place your forward knee over your foot. Keep your other knee touching the floor. 2. Slowly push your hips forward until you feel a stretch in the upper thigh of your rear leg. 3. Hold the stretch for at least 15 to 30 seconds. Repeat with your other leg. 4. Do 2 to 4 times on each side. Wall sit    1. Stand with your back 10 to 12 inches away from a wall. 2. Lean into the wall until your back is flat against it. 3. Slowly slide down until your knees are slightly bent, pressing your lower back into the wall. 4. Hold for about 6 seconds, then slide back up the wall. 5. Repeat 8 to 12 times. Follow-up care is a key part of your treatment and safety. Be sure to make and go to all appointments, and call your doctor if you are having problems. It's also a good idea to know your test results and keep alist of the medicines you take. Where can you learn more? Go to [email or web address] org and sign in to your [employer] account. Enter T215 in the [facility] box to learn more about \"Low Back Pain: Exercises. \"     If you do not have an account, please click on the \"Sign Up Now\" link. Current as of: July 1, 2021               Content Version: 13.2  © 2006-2022 [employer]. Care instructions adapted under license by [facility] ([facility]). If you have questions about a medical condition or this instruction, always ask your healthcare professional. [place] [address] any warranty or liability for your use of this information.

## 2022-06-26 DIAGNOSIS — K21.9 GASTROESOPHAGEAL REFLUX DISEASE WITHOUT ESOPHAGITIS: ICD-10-CM

## 2022-06-27 RX ORDER — ESOMEPRAZOLE MAGNESIUM 40 MG/1
CAPSULE, DELAYED RELEASE ORAL
Qty: 30 CAPSULE | Refills: 3 | Status: SHIPPED
Start: 2022-06-27 | End: 2022-09-23

## 2022-07-11 ENCOUNTER — OFFICE VISIT (OUTPATIENT)
Dept: CHIROPRACTIC MEDICINE | Age: 27
End: 2022-07-11
Payer: COMMERCIAL

## 2022-07-11 ENCOUNTER — OFFICE VISIT (OUTPATIENT)
Dept: NEUROLOGY | Age: 27
End: 2022-07-11
Payer: COMMERCIAL

## 2022-07-11 VITALS
TEMPERATURE: 98 F | BODY MASS INDEX: 23.56 KG/M2 | SYSTOLIC BLOOD PRESSURE: 111 MMHG | WEIGHT: 120 LBS | HEART RATE: 72 BPM | DIASTOLIC BLOOD PRESSURE: 73 MMHG | HEIGHT: 60 IN | OXYGEN SATURATION: 97 %

## 2022-07-11 VITALS — OXYGEN SATURATION: 98 % | HEART RATE: 83 BPM | TEMPERATURE: 97.9 F

## 2022-07-11 DIAGNOSIS — M99.01 SEGMENTAL AND SOMATIC DYSFUNCTION OF CERVICAL REGION: Primary | ICD-10-CM

## 2022-07-11 DIAGNOSIS — G89.29 CHRONIC BILATERAL THORACIC BACK PAIN: ICD-10-CM

## 2022-07-11 DIAGNOSIS — M54.6 CHRONIC BILATERAL THORACIC BACK PAIN: ICD-10-CM

## 2022-07-11 DIAGNOSIS — M54.2 CERVICALGIA: ICD-10-CM

## 2022-07-11 DIAGNOSIS — G25.1 MEDICATION-INDUCED POSTURAL TREMOR: Primary | ICD-10-CM

## 2022-07-11 DIAGNOSIS — R41.3 MEMORY DEFICIT: ICD-10-CM

## 2022-07-11 DIAGNOSIS — M99.03 SEGMENTAL AND SOMATIC DYSFUNCTION OF LUMBAR REGION: ICD-10-CM

## 2022-07-11 PROBLEM — F64.0 GENDER DYSPHORIA IN ADULT: Status: RESOLVED | Noted: 2021-03-29 | Resolved: 2022-07-11

## 2022-07-11 PROCEDURE — 98940 CHIROPRACT MANJ 1-2 REGIONS: CPT | Performed by: CHIROPRACTOR

## 2022-07-11 PROCEDURE — 99999 PR OFFICE/OUTPT VISIT,PROCEDURE ONLY: CPT | Performed by: CHIROPRACTOR

## 2022-07-11 PROCEDURE — G8420 CALC BMI NORM PARAMETERS: HCPCS | Performed by: NURSE PRACTITIONER

## 2022-07-11 PROCEDURE — 1036F TOBACCO NON-USER: CPT | Performed by: NURSE PRACTITIONER

## 2022-07-11 PROCEDURE — G8427 DOCREV CUR MEDS BY ELIG CLIN: HCPCS | Performed by: NURSE PRACTITIONER

## 2022-07-11 PROCEDURE — 99215 OFFICE O/P EST HI 40 MIN: CPT | Performed by: NURSE PRACTITIONER

## 2022-07-11 RX ORDER — PRIMIDONE 50 MG/1
TABLET ORAL
Qty: 135 TABLET | Refills: 3 | Status: SHIPPED | OUTPATIENT
Start: 2022-07-11

## 2022-07-11 NOTE — PROGRESS NOTES
22  Susan Vincent : 1995 Sex: adult  Age: 23 y.o. Chief Complaint   Patient presents with    Back Pain    Neck Pain       HPI:   Pain has worsened slightly. On average, pain is perceived as mild (1-3  pain scale). Patient denies new numbness, new weakness, new tingling      Current Outpatient Medications:     esomeprazole (NEXIUM) 40 MG delayed release capsule, TAKE 1 CAPSULE BY MOUTH EVERY DAY, Disp: 30 capsule, Rfl: 3    pimecrolimus (ELIDEL) 1 % cream, APPLY TO THE AFFECTED AREA TWICE DAILY, Disp: , Rfl:     VENTOLIN  (90 Base) MCG/ACT inhaler, INHALE 2 PUFFS BY MOUTH EVERY 4 TO 6 HOURS AS NEEDED, Disp: , Rfl:     fluconazole (DIFLUCAN) 150 MG tablet, Take 1 now and then another at the end of the course of the antibiotic (Patient not taking: Reported on 2022), Disp: 2 tablet, Rfl: 0    primidone (MYSOLINE) 50 MG tablet, TAKE 1 TABLET BY MOUTH EVERY NIGHT, Disp: 30 tablet, Rfl: 3    ketoconazole (NIZORAL) 2 % shampoo, WASH EVERY OTHER DAY LET SIT FOR 5 MINUTES PRIOR TO RINSING, Disp: , Rfl:     lidocaine (XYLOCAINE) 2 % jelly, APPLY TO THE AFFECTED AREA EVERY DAY FOR 1 DAY PRIOR TO PELVIC EXAMS, Disp: , Rfl:     nitrofurantoin (MACRODANTIN) 100 MG capsule, Take 100 mg by mouth daily, Disp: , Rfl:     methylphenidate (RITALIN) 20 MG tablet, TAKE 1 TABLET BY MOUTH EVERY DAY IN THE MORNING, Disp: , Rfl:     methylphenidate (RITALIN) 10 MG tablet, TAKE 1 TABLET BY MOUTH EVERY DAY IN THE AFTERNOON, Disp: , Rfl:     hydrOXYzine (ATARAX) 10 MG tablet, TAKE 1 TABLET BY MOUTH THREE TIMES DAILY AS NEEDED FOR ANXIETY OR PANIC ATTACKS, Disp: , Rfl:     vitamin D (ERGOCALCIFEROL) 1.25 MG (65891 UT) CAPS capsule, TAKE 1 CAPSULE BY MOUTH ONCE A WEEK, Disp: 4 capsule, Rfl: 3    PARoxetine (PAXIL) 10 MG tablet, Take 1 tablet by mouth daily, Disp: 30 tablet, Rfl: 3    triamcinolone (KENALOG) 0.1 % cream, Apply topically 2 times daily. , Disp: 80 g, Rfl: 3    chlorhexidine (PERIDEX) 0.12 % solution, TAKE 15 ML BY MOUTH TWICE DAILY AFTER MEALS *SWISH IN MOUTH FOR 30 SECONDS THEN SPIT OUT*, Disp: , Rfl:     fluticasone (FLONASE) 50 MCG/ACT nasal spray, 2 sprays by Each Nostril route daily, Disp: 3 Bottle, Rfl: 1    buPROPion (WELLBUTRIN XL) 150 MG extended release tablet, Take 150 mg by mouth every morning, Disp: , Rfl:     testosterone cypionate (DEPOTESTOTERONE CYPIONATE) 200 MG/ML injection, Inject 50 mg into the muscle once a week., Disp: , Rfl:     Exam:   Vitals:    07/11/22 1302   Pulse: 83   Temp: 97.9 °F (36.6 °C)   SpO2: 98%       There are hypertonic and tender fibers noted with palpation in the paraspinal muscles of the cervical, thoracic region. Joint fixation is noted with motion screening at C5-7, T3-6. Rachana Escalante was seen today for back pain and neck pain. Diagnoses and all orders for this visit:    Segmental and somatic dysfunction of cervical region    Segmental and somatic dysfunction of lumbar region    Cervicalgia    Chronic bilateral thoracic back pain        Treatment Plan:  Diversified manipulation to the above-listed segments in the cervical spine and thoracic spine. Noted relief following care.   I will see him back in approximately 3 weeks        Seen By:  Girma Ruiz DC

## 2022-07-11 NOTE — PROGRESS NOTES
Patient is here for a follow up. Patient states their neck and back has been feeling great.  Vanessa Diaz DO  Electronically signed by Emilie Marcum LPN on 4/94/6372 at 5:03 PM

## 2022-07-11 NOTE — PATIENT INSTRUCTIONS
Patient Education        primidone  Pronunciation: PRIM i done  Brand: Mysoline  What is the most important information I should know about primidone? Some people have thoughts about suicide while taking primidone. Stay alert to changes in your mood or symptoms. Report any new or worsening symptoms to your doctor. Do not stop using primidone suddenly, even if you feel fine. Stopping suddenly may cause increased seizures. What is primidone? Primidone is an anticonvulsant medicine that is used to control seizures. Primidone may also be used for purposes not listed in this medication guide. What should I discuss with my healthcare provider before taking primidone? You should not use this medicine if you are allergic to primidone orphenobarbital, or if you have:   porphyria (a genetic enzyme disorder that causes symptoms affecting the skin or nervous system). Tell your doctor if you have ever had:   depression;   a mood disorder; or   suicidal thoughts or actions. Some people have thoughts about suicide while taking primidone. Your doctor will need to check your progress at regular visits. Your family or othercaregivers should also be alert to changes in your mood or symptoms. Do not start or stop taking seizure medication during pregnancy without your doctor's advice. Having a seizure during pregnancy could harm both mother and baby. Tell your doctor right away if you become pregnant. If you are pregnant, your name may be listed on a pregnancy registry to track the effects of primidone on the baby. Be sure to tell the doctor who delivers your baby about your primidone use. Both you and the baby may need to receive medications to prevent excessivebleeding during delivery and just after birth. Stop breast-feeding if you notice unusual drowsiness in the nursing baby. Mary Meadowsd your doctor about how best to feed your baby while taking primidone. How should I take primidone?   Follow all directions on your prescription label and read all medication guides or instruction sheets. Your doctor may occasionally change your dose. Use themedicine exactly as directed. If you are switching to primidone from another seizure medication, you may needto start taking primidone only at bedtime. Follow your doctor's instructions. Do not stop using primidone suddenly, even if you feel fine. Stopping suddenly may cause increased seizures. Followyour doctor's instructions about tapering your dose. It may take several weeks before you receive the full benefit of takingprimidone. Your doctor will determine how long to treat you with this medicine. If you use this medicine long-term, you may need frequent medical tests. Store primidone at room temperature away from moisture, light, and heat. In case of emergency, wear or carry medical identification to let others knowyou take seizure medication. What happens if I miss a dose? Take the medicine as soon as you can, but skip the missed dose if it is almost time for your next dose. Do not take two doses at one time. What happens if I overdose? Seek emergency medical attention or call the Poison Help line at 1-545.213.7987. What should I avoid while taking primidone? Avoid driving or hazardous activity until you know how this medicine willaffect you. Your reactions could be impaired. Drinking alcohol with this medicine can increase side effects. What are the possible side effects of primidone? Get emergency medical help if you have signs of an allergic reaction: hives, mouth sores, skin rash with blistering and peeling; difficultbreathing; swelling of your face, lips, tongue, or throat. Report any new or worsening symptoms to your doctor, such as: mood or behavior changes, depression, anxiety, or if you feel agitated, hostile, restless, hyperactive (mentally or physically), or havethoughts about suicide or hurting yourself.   Call your doctor at once if you have:   unusual thoughts or behavior;   loss of balance or coordination;   fever, chills, sore throat;   easy bruising or bleeding; or   pale skin, unusual tiredness. Common side effects may include:   dizziness, drowsiness, spinning sensation;   problems with balance or muscle movement;   nausea, vomiting, loss of appetite;   feeling tired or irritable;   blurred vision;   rash; or   impotence, sexual problems. This is not a complete list of side effects and others may occur. Call your doctor for medical advice about side effects. You may report side effects toFDA at 9-048-NAG-4376. What other drugs will affect primidone? Using primidone with other drugs that make you drowsy can worsen this effect. Ask your doctor before using opioid medication, a sleeping pill, a musclerelaxer, or medicine for anxiety or seizures. Other drugs may affect primidone, including prescription and over-the-counter medicines, vitamins, and herbal products. Tell your doctor about all yourcurrent medicines and any medicine you start or stop using. Where can I get more information? Your pharmacist can provide more information about primidone. Remember, keep this and all other medicines out of the reach of children, never share your medicines with others, and use this medication only for the indication prescribed. Every effort has been made to ensure that the information provided by [address] is accurate, up-to-date, and complete, but no guarantee is made to that effect. Drug information contained herein may be time sensitive. [facility] information has been compiled for use by healthcare practitioners and consumers in the [employer] and therefore [facility] does not warrant that uses outside of the [employer] [employer] are appropriate, unless specifically indicated otherwise. [facility]'s drug information does not endorse drugs, diagnose patients or recommend therapy.  [employer]'s drug information is an informational resource designed to assist licensed healthcare practitioners in caring for their patients and/or to serve consumers viewing this service as a supplement to, and not a substitute for, the expertise, skill, knowledge and judgment of healthcare practitioners. The absence of a warning for a given drug or drug combination in no way should be construed to indicate that the drug or drug combination is safe, effective or appropriate for any given patient. [facility] does not assume any responsibility for any aspect of healthcare administered with the aid of information [facility] provides. The information contained herein is not intended to cover all possible uses, directions, precautions, warnings, drug interactions, allergic reactions, or adverse effects. If you have questions about the drugs you are taking, check with yourdoctor, nurse or pharmacist.  Copyright 7384-6856 [address]. Version: 5.01. Revision date: 2/18/2019. Care instructions adapted under license by [facility] ([facility]). If you have questions about a medical condition or this instruction, always ask your healthcare professional. [place] [address] any warranty or liability for your use of this information. Patient Education        Essential Tremor: Care Instructions  Overview     Essential tremor is a medical term for shaking that you can't control. Your hand or fingers may shake when you lift a cup or point at something. Or your voice may shake when you speak. It is not related to a stroke or Parkinson'sdisease. Some things can affect how much you shake. For example, anxiety may make tremors worse. Some medicines also can increase tremors. These include antidepressants and too much thyroid replacement. Talk to your doctor if youthink one of your medicines makes your tremors worse. If your tremors bother you, there are some things you can do to reduce them ormake them less noticeable. This includes taking medicine.   Follow-up care is a key part of your treatment and safety. Be sure to make and go to all appointments, and call your doctor if you are having problems. It's also a good idea to know your test results and keep alist of the medicines you take. How can you care for yourself at home?  Take your medicines exactly as prescribed. Call your doctor if you think you are having a problem with your medicine. Some medicines that help control tremors have to be taken every day, even if you are not having tremors. You will get more details on the specific medicines your doctor prescribes.  Get plenty of rest.   Eat a balanced, healthy diet.  Try to reduce stress. Regular exercise and massages may help.  Avoid drinks or foods with caffeine if they make your tremors worse. These include tea, cola, coffee, and chocolate.  Wear a heavy bracelet or watch. This adds a little weight to your hand. The extra weight may reduce tremors.  Drink from cups or glasses that are only half full. You may also want to try drinking with a straw. When should you call for help? Watch closely for changes in your health, and be sure to contact your doctor if:     You notice your tremors are getting worse.      You can't do your everyday activities because of your tremors.      You are bothered by your tremors. Where can you learn more? Go to https://[employer]. org and sign in to your [employer] account. Enter B746 in the [facility] box to learn more about \"Essential Tremor: Care Instructions. \"     If you do not have an account, please click on the \"Sign Up Now\" link. Current as of: December 13, 2021               Content Version: 13.3  © 5750-9534 Healthwise, Incorporated. Care instructions adapted under license by [facility] ([facility]). If you have questions about a medical condition or this instruction, always ask your healthcare professional. [address] any warranty or liability for your use of this information.

## 2022-07-11 NOTE — PROGRESS NOTES
239 [facility] MSN, APRN-CNP, [address], [address] [facility]      [address]  \"Melecio\" is a 23 y.o. right handed adult     We are following him for tremors--previously seen by Dr. Maral Keys    The patient presents alone and is a good historian. He identifies as a male. He has an additional significant past medical history of ADHD, anxiety, depression. For the past 5 years he has noticed action tremors of both hands, worse on the right. When he is very active or stressed these tremors worsen. 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 He is able to dress himself, feed himself, and write without issues. No resting tremors or gait abnormalities. No associated weakness or clumsiness. He was seen by Dr. Maral Keys in March 2021 for the same complaint. At that time he had been on propranolol. He is no longer on this medication and cannot remember why, but thinks it may have stopped working. PCP put him on primidone in August, which was initially helpful, but he does not feel it is working as well as before. He is currently on Ritalin for ADHD and takes his second dose around 3 PM.  He has been on Wellbutrin for years and is also on Paxil. He follows regularly with a therapist and psychiatrist.    He is also concerned about short-term memory decline \"for as long as I can remember. \"  He will forget his supplies for his schooling, sometimes leaves the oven on, and forgets other short-term details. He manages his own finances without issues. He has occasional issues with insomnia--sleeping 6 to 8 hours nightly. He drinks very minimal caffeine, no alcohol, eats regularly, and smokes marijuana about once per month. No family history of tremors or early dementia. No history of major head injuries.   He is on testosterone for gender transitioning. No chest pain or palpitations  No SOB  No vertigo, lightheadedness or loss of consciousness  No falls, tripping or stumbling  No incontinence of bowels or bladder  No itching or bruising appreciated  No numbness, tingling or focal arm/leg weakness  No speech or swallowing problems    ROS otherwise negative      Current Outpatient Medications   Medication Sig Dispense Refill    esomeprazole (NEXIUM) 40 MG delayed release capsule TAKE 1 CAPSULE BY MOUTH EVERY DAY 30 capsule 3    pimecrolimus (ELIDEL) 1 % cream APPLY TO THE AFFECTED AREA TWICE DAILY      VENTOLIN  (90 Base) MCG/ACT inhaler INHALE 2 PUFFS BY MOUTH EVERY 4 TO 6 HOURS AS NEEDED      primidone (MYSOLINE) 50 MG tablet TAKE 1 TABLET BY MOUTH EVERY NIGHT 30 tablet 3    ketoconazole (NIZORAL) 2 % shampoo WASH EVERY OTHER DAY LET SIT FOR 5 MINUTES PRIOR TO RINSING      lidocaine (XYLOCAINE) 2 % jelly APPLY TO THE AFFECTED AREA EVERY DAY FOR 1 DAY PRIOR TO PELVIC EXAMS      nitrofurantoin (MACRODANTIN) 100 MG capsule Take 100 mg by mouth daily      methylphenidate (RITALIN) 20 MG tablet TAKE 1 TABLET BY MOUTH EVERY DAY IN THE MORNING      methylphenidate (RITALIN) 10 MG tablet TAKE 1 TABLET BY MOUTH EVERY DAY IN THE AFTERNOON      hydrOXYzine (ATARAX) 10 MG tablet TAKE 1 TABLET BY MOUTH THREE TIMES DAILY AS NEEDED FOR ANXIETY OR PANIC ATTACKS      vitamin D (ERGOCALCIFEROL) 1.25 MG (10570 UT) CAPS capsule TAKE 1 CAPSULE BY MOUTH ONCE A WEEK 4 capsule 3    PARoxetine (PAXIL) 10 MG tablet Take 1 tablet by mouth daily 30 tablet 3    triamcinolone (KENALOG) 0.1 % cream Apply topically 2 times daily. 80 g 3    testosterone cypionate (DEPOTESTOTERONE CYPIONATE) 200 MG/ML injection Inject 50 mg into the muscle once a week.       chlorhexidine (PERIDEX) 0.12 % solution TAKE 15 ML BY MOUTH TWICE DAILY AFTER MEALS *SWISH IN MOUTH FOR 30 SECONDS THEN SPIT OUT*      fluticasone (FLONASE) 50 MCG/ACT nasal spray 2 sprays by Each Nostril route daily 3 Bottle 1    buPROPion (WELLBUTRIN XL) 150 MG extended release tablet Take 150 mg by mouth every morning       No current facility-administered medications for this visit.      Objective:     /73 (Site: Left Upper Arm)   Pulse 72   Temp 98 °F (36.7 °C)   Ht 5' (1.524 m)   Wt 120 lb (54.4 kg)   SpO2 97%   BMI 23.44 kg/m²     General appearance: alert, appears stated age and cooperative  Head: Normocephalic, without obvious abnormality, atraumatic  Eyes: conjunctivae/corneas clear  Neck: Full range of motion without cervicalgia; no cogwheeling  Lungs: clear to auscultation bilaterally  Heart: regular rate and rhythm, S1, S2 normal, no murmur, click, rub or gallop  Extremities: extremities normal, atraumatic, no cyanosis or edema  Pulses: 2+ and symmetric  Skin: Skin color, texture, turgor normal. No rashes or lesions       Mental Status: Alert, oriented x4    Appropriate attention/concentration  Intact fundus of knowledge  Intact memories    MMSE 29/20  Draws clock without issues    Speech: no dysarthria  Language: no aphasias    Cranial Nerves:  I: smell NA   II: visual acuity  NA   II: visual fields Full to confrontation   II: pupils MAYRA   III,VII: ptosis None   III,IV,VI: extraocular muscles  Full ROM   V: mastication Normal   V: facial light touch sensation  Normal   V,VII: corneal reflex     VII: facial muscle function - upper  Normal   VII: facial muscle function - lower Normal   VIII: hearing Normal   IX: soft palate elevation  Normal   IX,X: gag reflex    XI: trapezius strength  5/5   XI: sternocleidomastoid strength 5/5   XI: neck extension strength  5/5   XII: tongue strength  Normal     Motor:  5/5 throughout  Normal bulk and tone  No drift  Mild fine action tremors of both hands when outstretched  No tremors noted when holding cell phone or texting  No resting tremors or bradykinesia    Sensory:  LT normal in all limbs    Coordination:   FN, FFM, ILAN normal b/l    Gait:  Normal    DTR:   2+ throughout    No Acevedo's  No pathological reflexes    Laboratory/Radiology:     CBC with Differential:    Lab Results   Component Value Date/Time    WBC 13.0 04/14/2022 02:49 AM    RBC 4.81 04/14/2022 02:49 AM    HGB 14.5 04/14/2022 02:49 AM    HCT 44.4 04/14/2022 02:49 AM     04/14/2022 02:49 AM    MCV 92.3 04/14/2022 02:49 AM    MCH 30.1 04/14/2022 02:49 AM    MCHC 32.7 04/14/2022 02:49 AM    RDW 12.9 04/14/2022 02:49 AM    LYMPHOPCT 18.5 04/14/2022 02:49 AM    MONOPCT 5.5 04/14/2022 02:49 AM    BASOPCT 0.8 04/14/2022 02:49 AM    MONOSABS 0.71 04/14/2022 02:49 AM    LYMPHSABS 2.40 04/14/2022 02:49 AM    EOSABS 0.37 04/14/2022 02:49 AM    BASOSABS 0.10 04/14/2022 02:49 AM     CMP:    Lab Results   Component Value Date/Time     04/14/2022 02:49 AM    K 4.3 04/14/2022 02:49 AM    K 4.3 02/12/2022 10:16 AM     04/14/2022 02:49 AM    CO2 28 04/14/2022 02:49 AM    BUN 10 04/14/2022 02:49 AM    CREATININE 0.8 04/14/2022 02:49 AM    GFRAA >60 04/14/2022 02:49 AM    LABGLOM >60 04/14/2022 02:49 AM    GLUCOSE 77 04/14/2022 02:49 AM    PROT 7.2 04/14/2022 02:49 AM    LABALBU 4.5 04/14/2022 02:49 AM    CALCIUM 9.6 04/14/2022 02:49 AM    BILITOT <0.2 04/14/2022 02:49 AM    ALKPHOS 79 04/14/2022 02:49 AM    AST 15 04/14/2022 02:49 AM    ALT 16 04/14/2022 02:49 AM     TSH:    Lab Results   Component Value Date/Time    TSH 1.440 02/22/2022 12:02 PM     All pertinent labs and images personally reviewed today    Assessment:     Probable medication induced tremor: Secondary to Ritalin, Paxil, and Wellbutrin usage--and aggravated by stress, anxiety, and suboptimal sleep. Low suspicion for worrisome tremor at this time, but since he is bothered by it, we can attempt to titrate current medications. .  I hesitate to add additional medications to his regimen simply to treat the side effects caused by his current medications, but combination use of beta-blocker and primidone is sometimes more effective than either alone. Subjective memory problem: I find no evidence of memory decline on exam today and suspect that this is also medication induced versus related to ADHD and depression.     Plan:     Change primidone to 25 mg AM and 50 mg PM    Discussed wrist weights    Take second dose of Ritalin earlier in the afternoon, around 1 PM    Consider reduction in stimulant medications if able    Neuropsych testing-- with current mental health provider or patient's choice    Sleep hygiene, stress mgmt, avoidance of caffeine    RTO in 4 mos or sooner PRN    TASHA Cha - CNP  10:11 AM  7/11/2022

## 2022-07-29 DIAGNOSIS — E55.9 VITAMIN D DEFICIENCY: ICD-10-CM

## 2022-07-29 RX ORDER — ERGOCALCIFEROL 1.25 MG/1
CAPSULE ORAL
Qty: 4 CAPSULE | Refills: 3 | Status: SHIPPED
Start: 2022-07-29 | End: 2022-09-26 | Stop reason: SDUPTHER

## 2022-07-29 NOTE — TELEPHONE ENCOUNTER
Last Appointment:  4/18/2022  Future Appointments   Date Time Provider Bonny Clark   8/8/2022  1:00 PM PACO [facility]   12/5/2022  1:00 PM TASHA Brenner - CNP [facility]

## 2022-08-08 ENCOUNTER — OFFICE VISIT (OUTPATIENT)
Dept: CHIROPRACTIC MEDICINE | Age: 27
End: 2022-08-08
Payer: COMMERCIAL

## 2022-08-08 VITALS — TEMPERATURE: 97.4 F | HEART RATE: 69 BPM | OXYGEN SATURATION: 97 % | RESPIRATION RATE: 20 BRPM

## 2022-08-08 DIAGNOSIS — G89.29 CHRONIC BILATERAL THORACIC BACK PAIN: ICD-10-CM

## 2022-08-08 DIAGNOSIS — M99.03 SEGMENTAL AND SOMATIC DYSFUNCTION OF LUMBAR REGION: ICD-10-CM

## 2022-08-08 DIAGNOSIS — M54.6 CHRONIC BILATERAL THORACIC BACK PAIN: ICD-10-CM

## 2022-08-08 DIAGNOSIS — M99.02 SEGMENTAL AND SOMATIC DYSFUNCTION OF THORACIC REGION: ICD-10-CM

## 2022-08-08 DIAGNOSIS — G89.29 CHRONIC BILATERAL LOW BACK PAIN WITHOUT SCIATICA: ICD-10-CM

## 2022-08-08 DIAGNOSIS — M99.01 SEGMENTAL AND SOMATIC DYSFUNCTION OF CERVICAL REGION: Primary | ICD-10-CM

## 2022-08-08 DIAGNOSIS — M54.2 CERVICALGIA: ICD-10-CM

## 2022-08-08 DIAGNOSIS — M54.50 CHRONIC BILATERAL LOW BACK PAIN WITHOUT SCIATICA: ICD-10-CM

## 2022-08-08 DIAGNOSIS — M62.830 MUSCLE SPASM OF BACK: ICD-10-CM

## 2022-08-08 PROCEDURE — 98941 CHIROPRACT MANJ 3-4 REGIONS: CPT | Performed by: CHIROPRACTOR

## 2022-08-08 PROCEDURE — 99999 PR OFFICE/OUTPT VISIT,PROCEDURE ONLY: CPT | Performed by: CHIROPRACTOR

## 2022-08-08 NOTE — PROGRESS NOTES
22  Clarice Ellis : 1995 Sex: adult  Age: 23 y.o. Chief Complaint   Patient presents with    Back Pain       HPI:   Pain has worsened. On average, pain is perceived as moderate (4-6 pain scale). Patient denies new numbness, new weakness, new tingling. No new injuries or issues. But she has been dealing with a death in the family, lots of increased stress. Current Outpatient Medications:     vitamin D (ERGOCALCIFEROL) 1.25 MG (33160 UT) CAPS capsule, TAKE 1 CAPSULE BY MOUTH 1 TIME A WEEK, Disp: 4 capsule, Rfl: 3    primidone (MYSOLINE) 50 MG tablet, Take 0.5 tablets by mouth every morning AND 1 tablet nightly., Disp: 135 tablet, Rfl: 3    esomeprazole (NEXIUM) 40 MG delayed release capsule, TAKE 1 CAPSULE BY MOUTH EVERY DAY, Disp: 30 capsule, Rfl: 3    pimecrolimus (ELIDEL) 1 % cream, APPLY TO THE AFFECTED AREA TWICE DAILY, Disp: , Rfl:     VENTOLIN  (90 Base) MCG/ACT inhaler, INHALE 2 PUFFS BY MOUTH EVERY 4 TO 6 HOURS AS NEEDED, Disp: , Rfl:     ketoconazole (NIZORAL) 2 % shampoo, WASH EVERY OTHER DAY LET SIT FOR 5 MINUTES PRIOR TO RINSING, Disp: , Rfl:     lidocaine (XYLOCAINE) 2 % jelly, APPLY TO THE AFFECTED AREA EVERY DAY FOR 1 DAY PRIOR TO PELVIC EXAMS, Disp: , Rfl:     nitrofurantoin (MACRODANTIN) 100 MG capsule, Take 100 mg by mouth daily, Disp: , Rfl:     methylphenidate (RITALIN) 20 MG tablet, TAKE 1 TABLET BY MOUTH EVERY DAY IN THE MORNING, Disp: , Rfl:     methylphenidate (RITALIN) 10 MG tablet, TAKE 1 TABLET BY MOUTH EVERY DAY IN THE AFTERNOON, Disp: , Rfl:     hydrOXYzine (ATARAX) 10 MG tablet, TAKE 1 TABLET BY MOUTH THREE TIMES DAILY AS NEEDED FOR ANXIETY OR PANIC ATTACKS, Disp: , Rfl:     PARoxetine (PAXIL) 10 MG tablet, Take 1 tablet by mouth daily, Disp: 30 tablet, Rfl: 3    triamcinolone (KENALOG) 0.1 % cream, Apply topically 2 times daily. , Disp: 80 g, Rfl: 3    chlorhexidine (PERIDEX) 0.12 % solution, TAKE 15 ML BY MOUTH TWICE DAILY AFTER MEALS *SWISH IN MOUTH FOR 30 SECONDS THEN SPIT OUT*, Disp: , Rfl:     fluticasone (FLONASE) 50 MCG/ACT nasal spray, 2 sprays by Each Nostril route daily, Disp: 3 Bottle, Rfl: 1    buPROPion (WELLBUTRIN XL) 150 MG extended release tablet, Take 150 mg by mouth every morning, Disp: , Rfl:     testosterone cypionate (DEPOTESTOTERONE CYPIONATE) 200 MG/ML injection, Inject 50 mg into the muscle once a week., Disp: , Rfl:     Exam:   Vitals:    08/08/22 1303   Pulse: 69   Resp: 20   Temp: 97.4 °F (36.3 °C)   SpO2: 97%       There are hypertonic and tender fibers noted with palpation in the paraspinal muscles of the cervical, thoracic, lumbar region. Joint fixation is noted with motion screening at C4-6, T3-5, T12-L3. Maureen Vargas was seen today for back pain. Diagnoses and all orders for this visit:    Segmental and somatic dysfunction of cervical region    Segmental and somatic dysfunction of lumbar region    Segmental and somatic dysfunction of thoracic region    Chronic bilateral low back pain without sciatica    Chronic bilateral thoracic back pain    Cervicalgia    Muscle spasm of back      Treatment Plan: Continued with diversified manipulation to the effected cervical, thoracic and lumbar segments today. Tolerated well.   I will see him back as needed for further care      Seen By:  Marcial Goyal

## 2022-09-22 DIAGNOSIS — K21.9 GASTROESOPHAGEAL REFLUX DISEASE WITHOUT ESOPHAGITIS: ICD-10-CM

## 2022-09-23 RX ORDER — ESOMEPRAZOLE MAGNESIUM 40 MG/1
CAPSULE, DELAYED RELEASE ORAL
Qty: 90 CAPSULE | Refills: 1 | Status: SHIPPED
Start: 2022-09-23 | End: 2022-09-26 | Stop reason: SDUPTHER

## 2022-09-23 NOTE — TELEPHONE ENCOUNTER
Last Appointment:  4/18/2022  Future Appointments   Date Time Provider Bonny Clark   9/26/2022  2:00 PM [place], [address] [place], [place] [address]AM [facility]   12/5/2022  1:00 PM TASHA Betts CNP [facility]

## 2022-09-26 ENCOUNTER — OFFICE VISIT (OUTPATIENT)
Dept: FAMILY MEDICINE CLINIC | Age: 27
End: 2022-09-26
Payer: COMMERCIAL

## 2022-09-26 VITALS
TEMPERATURE: 97.5 F | RESPIRATION RATE: 18 BRPM | OXYGEN SATURATION: 99 % | WEIGHT: 131 LBS | BODY MASS INDEX: 25.72 KG/M2 | HEIGHT: 60 IN | HEART RATE: 78 BPM | SYSTOLIC BLOOD PRESSURE: 110 MMHG | DIASTOLIC BLOOD PRESSURE: 65 MMHG

## 2022-09-26 DIAGNOSIS — E55.9 VITAMIN D DEFICIENCY: ICD-10-CM

## 2022-09-26 DIAGNOSIS — R25.1 EXCESSIVE PHYSIOLOGIC TREMOR: ICD-10-CM

## 2022-09-26 DIAGNOSIS — Z00.00 ROUTINE PHYSICAL EXAMINATION: Primary | ICD-10-CM

## 2022-09-26 DIAGNOSIS — K21.9 GASTROESOPHAGEAL REFLUX DISEASE WITHOUT ESOPHAGITIS: ICD-10-CM

## 2022-09-26 PROCEDURE — 99395 PREV VISIT EST AGE 18-39: CPT | Performed by: STUDENT IN AN ORGANIZED HEALTH CARE EDUCATION/TRAINING PROGRAM

## 2022-09-26 RX ORDER — ESOMEPRAZOLE MAGNESIUM 40 MG/1
CAPSULE, DELAYED RELEASE ORAL
Qty: 90 CAPSULE | Refills: 1 | Status: SHIPPED | OUTPATIENT
Start: 2022-09-26

## 2022-09-26 RX ORDER — ERGOCALCIFEROL 1.25 MG/1
CAPSULE ORAL
Qty: 12 CAPSULE | Refills: 3 | Status: SHIPPED | OUTPATIENT
Start: 2022-09-26

## 2022-09-26 SDOH — ECONOMIC STABILITY: FOOD INSECURITY: WITHIN THE PAST 12 MONTHS, YOU WORRIED THAT YOUR FOOD WOULD RUN OUT BEFORE YOU GOT MONEY TO BUY MORE.: NEVER TRUE

## 2022-09-26 SDOH — ECONOMIC STABILITY: FOOD INSECURITY: WITHIN THE PAST 12 MONTHS, THE FOOD YOU BOUGHT JUST DIDN'T LAST AND YOU DIDN'T HAVE MONEY TO GET MORE.: NEVER TRUE

## 2022-09-26 ASSESSMENT — ENCOUNTER SYMPTOMS
EYE PAIN: 0
ABDOMINAL PAIN: 0
NAUSEA: 0
COUGH: 0
RHINORRHEA: 0
CONSTIPATION: 0
VOMITING: 0
SHORTNESS OF BREATH: 0
BACK PAIN: 0
CHEST TIGHTNESS: 0
DIARRHEA: 0

## 2022-09-26 ASSESSMENT — SOCIAL DETERMINANTS OF HEALTH (SDOH): HOW HARD IS IT FOR YOU TO PAY FOR THE VERY BASICS LIKE FOOD, HOUSING, MEDICAL CARE, AND HEATING?: NOT HARD AT ALL

## 2022-09-26 NOTE — PROGRESS NOTES
9/26/2022    [facility]  Chief Complaint   Patient presents with    Follow-up       Particia Duty is a 23 y.o. adult who  has a past medical history of Acid reflux, ADHD, Anxiety, Depression, Tremor, and Vitamin D deficiency. Annual exam:  Patient is here for routine yearly physical/preventative visit. Patient has no complaints or concerns today. Patient is  generally healthy. Chronic medical conditions are generally controlled. Most recent labs reviewed with patient and  are not remarkable. Health maintenance reviewed with patient and is  up to date. Overall doing well. He is following with neurology for the tremors and memory issues. He was referred to a neuropsychologist for this as well. Review of Systems   Constitutional:  Negative for chills, fatigue and fever. HENT:  Negative for congestion, ear pain, postnasal drip and rhinorrhea. Eyes:  Negative for pain. Respiratory:  Negative for cough, chest tightness and shortness of breath. Cardiovascular:  Negative for chest pain. Gastrointestinal:  Negative for abdominal pain, constipation, diarrhea, nausea and vomiting. Genitourinary:  Negative for difficulty urinating, dysuria and frequency. Musculoskeletal:  Negative for back pain and myalgias. Skin:  Negative for rash. Neurological:  Positive for tremors. Negative for dizziness, light-headedness, numbness and headaches. Memory loss+      Prior to Visit Medications    Medication Sig Taking? Authorizing Provider   esomeprazole (NEXIUM) 40 MG delayed release capsule Take 1 tablet by mouth daily Yes Michelle Noel DO   vitamin D (ERGOCALCIFEROL) 1.25 MG (80307 UT) CAPS capsule TAKE 1 CAPSULE BY MOUTH 1 TIME A WEEK Yes Michelle Noel DO   primidone (MYSOLINE) 50 MG tablet Take 0.5 tablets by mouth every morning AND 1 tablet nightly.  Yes Christine Morales, APRN - CNP   pimecrolimus (ELIDEL) 1 % cream APPLY TO THE AFFECTED AREA TWICE DAILY Yes Historical Provider, MD James Edgar  (90 Base) MCG/ACT inhaler INHALE 2 PUFFS BY MOUTH EVERY 4 TO 6 HOURS AS NEEDED Yes Historical Provider, MD   nitrofurantoin (MACRODANTIN) 100 MG capsule Take 100 mg by mouth daily Yes Historical Provider, MD   methylphenidate (RITALIN) 20 MG tablet TAKE 1 TABLET BY MOUTH EVERY DAY IN THE MORNING Yes Historical Provider, MD   methylphenidate (RITALIN) 10 MG tablet TAKE 1 TABLET BY MOUTH EVERY DAY IN THE AFTERNOON Yes Historical Provider, MD   hydrOXYzine (ATARAX) 10 MG tablet TAKE 1 TABLET BY MOUTH THREE TIMES DAILY AS NEEDED FOR ANXIETY OR PANIC ATTACKS Yes Historical Provider, MD   PARoxetine (PAXIL) 10 MG tablet Take 1 tablet by mouth daily Yes Michelle Noel DO   triamcinolone (KENALOG) 0.1 % cream Apply topically 2 times daily. Yes Michelle Noel DO   testosterone cypionate (DEPOTESTOTERONE CYPIONATE) 200 MG/ML injection Inject 50 mg into the muscle once a week. Yes Historical Provider, MD   chlorhexidine (PERIDEX) 0.12 % solution TAKE 15 ML BY MOUTH TWICE DAILY AFTER MEALS *SWISH IN MOUTH FOR 30 SECONDS THEN SPIT OUT* Yes Historical Provider, MD   fluticasone (FLONASE) 50 MCG/ACT nasal spray 2 sprays by Each Nostril route daily Yes Michelle Noel DO   buPROPion (WELLBUTRIN XL) 150 MG extended release tablet Take 150 mg by mouth every morning Yes Historical Provider, MD   ketoconazole (NIZORAL) 2 % shampoo WASH EVERY OTHER DAY LET SIT FOR 5 MINUTES PRIOR TO RINSING  Patient not taking: Reported on 9/26/2022  Historical Provider, MD   lidocaine (XYLOCAINE) 2 % jelly APPLY TO THE AFFECTED AREA EVERY DAY FOR 1 DAY PRIOR TO PELVIC EXAMS  Patient not taking: Reported on 9/26/2022  Historical Provider, MD        No Known Allergies    Past Medical History:   Diagnosis Date    Acid reflux     ADHD     Anxiety     Depression     Tremor 03/29/2021    Vitamin D deficiency        History reviewed. No pertinent surgical history.     Social History     Socioeconomic History    Marital status: Single     Spouse name: Not on file    Number of children: Not on file    Years of education: Not on file    Highest education level: Not on file   Occupational History    Not on file   Tobacco Use    Smoking status: Never    Smokeless tobacco: Never   Vaping Use    Vaping Use: Never used   Substance and Sexual Activity    Alcohol use: Never    Drug use: Not on file     Comment: last used in September 2020    Sexual activity: Yes   Other Topics Concern    Not on file   Social History Narrative    Not on file     Social Determinants of Health     Financial Resource Strain: Low Risk     Difficulty of Paying Living Expenses: Not hard at all   Food Insecurity: No Food Insecurity    Worried About Running Out of Food in the Last Year: Never true    Ran Out of Food in the Last Year: Never true   Transportation Needs: Not on file   Physical Activity: Not on file   Stress: Not on file   Social Connections: Not on file   Intimate Partner Violence: Not on file   Housing Stability: Not on file        Family History   Problem Relation Age of Onset    Hypothyroidism Mother     High Blood Pressure Father     Depression Maternal Uncle     Asthma Paternal Aunt     High Blood Pressure Maternal Grandmother     Hypothyroidism Maternal Grandmother     Cancer Maternal Grandfather     Stroke Paternal Grandmother     Heart Attack Paternal Grandfather     Irritable Bowel Syndrome Sister            Vitals:    09/26/22 1420   BP: 110/65   Pulse: 78   Resp: 18   Temp: 97.5 °F (36.4 °C)   TempSrc: Temporal   SpO2: 99%   Weight: 131 lb (59.4 kg)   Height: 5' (1.524 m)     Estimated body mass index is 25.58 kg/m² as calculated from the following:    Height as of this encounter: 5' (1.524 m). Weight as of this encounter: 131 lb (59.4 kg).     Most Recent Labs  CBC  Lab Results   Component Value Date/Time    WBC 13.0 04/14/2022 02:49 AM    WBC 16.3 02/12/2022 10:16 AM    WBC 6.5 12/14/2021 12:00 PM    RBC 4.81 04/14/2022 02:49 AM    RBC 4.84 02/12/2022 10:16 AM    RBC 4.99 12/14/2021 12:00 PM    HGB 14.5 04/14/2022 02:49 AM    HGB 14.8 02/12/2022 10:16 AM    HGB 14.8 12/14/2021 12:00 PM    HCT 44.4 04/14/2022 02:49 AM    HCT 45.5 02/12/2022 10:16 AM    HCT 46.4 12/14/2021 12:00 PM    MCV 92.3 04/14/2022 02:49 AM    MCV 94.0 02/12/2022 10:16 AM    MCV 93.0 12/14/2021 12:00 PM     04/14/2022 02:49 AM     02/12/2022 10:16 AM     12/14/2021 12:00 PM      CMP  Lab Results   Component Value Date/Time     04/14/2022 02:49 AM     02/12/2022 10:16 AM     03/02/2021 12:00 PM    K 4.3 04/14/2022 02:49 AM    K 4.3 02/12/2022 10:16 AM    K 4.7 03/02/2021 12:00 PM     04/14/2022 02:49 AM     02/12/2022 10:16 AM     03/02/2021 12:00 PM    CO2 28 04/14/2022 02:49 AM    CO2 29 02/12/2022 10:16 AM    CO2 24 03/02/2021 12:00 PM    ANIONGAP 10 04/14/2022 02:49 AM    ANIONGAP 7 02/12/2022 10:16 AM    ANIONGAP 10 03/02/2021 12:00 PM    GLUCOSE 77 04/14/2022 02:49 AM    GLUCOSE 88 02/12/2022 10:16 AM    GLUCOSE 86 03/02/2021 12:00 PM    BUN 10 04/14/2022 02:49 AM    BUN 12 02/12/2022 10:16 AM    BUN 9 03/02/2021 12:00 PM    CREATININE 0.8 04/14/2022 02:49 AM    CREATININE 0.8 02/12/2022 10:16 AM    CREATININE 0.8 03/02/2021 12:00 PM    LABGLOM >60 04/14/2022 02:49 AM    LABGLOM >60 02/12/2022 10:16 AM    LABGLOM >60 03/02/2021 12:00 PM    GFRAA >60 04/14/2022 02:49 AM    GFRAA >60 02/12/2022 10:16 AM    GFRAA >60 03/02/2021 12:00 PM    CALCIUM 9.6 04/14/2022 02:49 AM    CALCIUM 10.0 02/12/2022 10:16 AM    CALCIUM 9.1 03/02/2021 12:00 PM    PROT 7.2 04/14/2022 02:49 AM    PROT 7.5 02/12/2022 10:16 AM    PROT 7.3 03/02/2021 12:00 PM    LABALBU 4.5 04/14/2022 02:49 AM    LABALBU 4.6 02/12/2022 10:16 AM    LABALBU 4.5 03/02/2021 12:00 PM    BILITOT <0.2 04/14/2022 02:49 AM    BILITOT 0.4 02/12/2022 10:16 AM    BILITOT 0.3 03/02/2021 12:00 PM    ALKPHOS 79 04/14/2022 02:49 AM    ALKPHOS 77 02/12/2022 10:16 AM    ALKPHOS 50 03/02/2021 12:00 PM    AST NITRU Negative 04/14/2022 02:49 AM    NITRU Negative 02/12/2022 10:16 AM    LEUKOCYTESUR small 05/02/2022 12:09 PM    LEUKOCYTESUR TRACE 04/14/2022 02:49 AM    LEUKOCYTESUR LARGE 02/12/2022 10:16 AM       Physical Exam  Vitals and nursing note reviewed. Constitutional:       Appearance: Normal appearance. HENT:      Head: Normocephalic and atraumatic. Right Ear: External ear normal.      Left Ear: External ear normal.   Eyes:      Extraocular Movements: Extraocular movements intact. Conjunctiva/sclera: Conjunctivae normal.      Pupils: Pupils are equal, round, and reactive to light. Cardiovascular:      Rate and Rhythm: Normal rate and regular rhythm. Pulses: Normal pulses. Heart sounds: Normal heart sounds. Pulmonary:      Effort: Pulmonary effort is normal.      Breath sounds: Normal breath sounds. Abdominal:      General: Bowel sounds are normal.      Palpations: Abdomen is soft. Musculoskeletal:         General: Normal range of motion. Cervical back: Normal range of motion and neck supple. Skin:     General: Skin is warm and dry. Capillary Refill: Capillary refill takes less than 2 seconds. Neurological:      General: No focal deficit present. Mental Status: He is alert and oriented to person, place, and time. Psychiatric:         Mood and Affect: Mood normal.         Behavior: Behavior normal.         Thought Content: Thought content normal.       ASSESSMENT/PLAN:  Luci Trevizo was seen today for follow-up. Diagnoses and all orders for this visit:    Routine physical examination    Gastroesophageal reflux disease without esophagitis  -     esomeprazole (NEXIUM) 40 MG delayed release capsule; Take 1 tablet by mouth daily    Excessive physiologic tremor    Vitamin D deficiency  -     vitamin D (ERGOCALCIFEROL) 1.25 MG (62052 UT) CAPS capsule; TAKE 1 CAPSULE BY MOUTH 1 TIME A WEEK       As above.   Call or go to the nearest ED immediately if symptoms worsen or persist.  Educational materials and/or home exercises printed for patient's review and were included in patient instructions on his/her After Visit Summary and given to patient at the end of visit. Counseled regarding above diagnosis, including possible risks and complications,  especially if left uncontrolled. Counseled regarding the possible side effects, risks, benefits and alternatives to treatment; patient and/or guardian verbalizes understanding, agrees, feels comfortable with and wishes to proceed with above treatment plan. Advised patient to call with any new medication issues, and read all Rx info from pharmacy to assure aware of all possible risks and side effects of medication before taking. Reviewed age and gender appropriate health screening exams and vaccinations. Advised patient regarding importance of keeping up with recommended health maintenance and to schedule as soon as possible if overdue, as this is important in assessing for undiagnosed pathology, especially cancer, as well as protecting against potentially harmful/life threatening disease. Patient and/or guardian verbalizes understanding and agrees with above counseling, assessment and plan. All questions answered. No follow-ups on file. An  electronic signature was used to authenticate this note. --Refugio Anguiano DO on 9/26/2022 at 3:02 PM    This document was prepared at least partially through the use of voice recognition software. Although effort is taken to assure the accuracy of this document, it is possible that grammatical, syntax,  or spelling errors may occur.

## 2022-10-03 ENCOUNTER — OFFICE VISIT (OUTPATIENT)
Dept: CHIROPRACTIC MEDICINE | Age: 27
End: 2022-10-03
Payer: COMMERCIAL

## 2022-10-03 VITALS
TEMPERATURE: 97.7 F | HEIGHT: 60 IN | BODY MASS INDEX: 25.72 KG/M2 | HEART RATE: 80 BPM | OXYGEN SATURATION: 97 % | WEIGHT: 131 LBS

## 2022-10-03 DIAGNOSIS — M54.6 CHRONIC BILATERAL THORACIC BACK PAIN: ICD-10-CM

## 2022-10-03 DIAGNOSIS — M54.2 CERVICALGIA: ICD-10-CM

## 2022-10-03 DIAGNOSIS — M62.830 MUSCLE SPASM OF BACK: ICD-10-CM

## 2022-10-03 DIAGNOSIS — M99.01 SEGMENTAL AND SOMATIC DYSFUNCTION OF CERVICAL REGION: Primary | ICD-10-CM

## 2022-10-03 DIAGNOSIS — M99.02 SEGMENTAL AND SOMATIC DYSFUNCTION OF THORACIC REGION: ICD-10-CM

## 2022-10-03 DIAGNOSIS — G89.29 CHRONIC BILATERAL THORACIC BACK PAIN: ICD-10-CM

## 2022-10-03 DIAGNOSIS — M99.03 SEGMENTAL AND SOMATIC DYSFUNCTION OF LUMBAR REGION: ICD-10-CM

## 2022-10-03 DIAGNOSIS — M54.50 CHRONIC BILATERAL LOW BACK PAIN WITHOUT SCIATICA: ICD-10-CM

## 2022-10-03 DIAGNOSIS — G89.29 CHRONIC BILATERAL LOW BACK PAIN WITHOUT SCIATICA: ICD-10-CM

## 2022-10-03 PROCEDURE — 99999 PR OFFICE/OUTPT VISIT,PROCEDURE ONLY: CPT | Performed by: CHIROPRACTOR

## 2022-10-03 PROCEDURE — 98941 CHIROPRACT MANJ 3-4 REGIONS: CPT | Performed by: CHIROPRACTOR

## 2022-10-03 NOTE — PROGRESS NOTES
10/3/22  [facility] : 1995 Sex: adult  Age: 23 y.o. Chief Complaint   Patient presents with    Back Pain    Neck Pain    Shoulder Pain       HPI:   Pain has worsened. On average, pain is perceived as moderate (4-6 pain scale). Patient denies new numbness, new weakness, new tingling. About 3 weeks ago he was on his way back to school, carrying several heavy bags on the bus. As a result he developed increased back pains. No radiation of symptoms to the extremities. Presents today for continued care.       Current Outpatient Medications:     esomeprazole (NEXIUM) 40 MG delayed release capsule, Take 1 tablet by mouth daily, Disp: 90 capsule, Rfl: 1    vitamin D (ERGOCALCIFEROL) 1.25 MG (23072 UT) CAPS capsule, TAKE 1 CAPSULE BY MOUTH 1 TIME A WEEK, Disp: 12 capsule, Rfl: 3    primidone (MYSOLINE) 50 MG tablet, Take 0.5 tablets by mouth every morning AND 1 tablet nightly., Disp: 135 tablet, Rfl: 3    pimecrolimus (ELIDEL) 1 % cream, APPLY TO THE AFFECTED AREA TWICE DAILY, Disp: , Rfl:     VENTOLIN  (90 Base) MCG/ACT inhaler, INHALE 2 PUFFS BY MOUTH EVERY 4 TO 6 HOURS AS NEEDED, Disp: , Rfl:     ketoconazole (NIZORAL) 2 % shampoo, WASH EVERY OTHER DAY LET SIT FOR 5 MINUTES PRIOR TO RINSING (Patient not taking: Reported on 2022), Disp: , Rfl:     lidocaine (XYLOCAINE) 2 % jelly, APPLY TO THE AFFECTED AREA EVERY DAY FOR 1 DAY PRIOR TO PELVIC EXAMS (Patient not taking: Reported on 2022), Disp: , Rfl:     nitrofurantoin (MACRODANTIN) 100 MG capsule, Take 100 mg by mouth daily, Disp: , Rfl:     methylphenidate (RITALIN) 20 MG tablet, TAKE 1 TABLET BY MOUTH EVERY DAY IN THE MORNING, Disp: , Rfl:     methylphenidate (RITALIN) 10 MG tablet, TAKE 1 TABLET BY MOUTH EVERY DAY IN THE AFTERNOON, Disp: , Rfl:     hydrOXYzine (ATARAX) 10 MG tablet, TAKE 1 TABLET BY MOUTH THREE TIMES DAILY AS NEEDED FOR ANXIETY OR PANIC ATTACKS, Disp: , Rfl:     PARoxetine (PAXIL) 10 MG tablet, Take 1 tablet by mouth daily, Disp: 30 tablet, Rfl: 3    triamcinolone (KENALOG) 0.1 % cream, Apply topically 2 times daily. , Disp: 80 g, Rfl: 3    testosterone cypionate (DEPOTESTOTERONE CYPIONATE) 200 MG/ML injection, Inject 50 mg into the muscle once a week., Disp: , Rfl:     chlorhexidine (PERIDEX) 0.12 % solution, TAKE 15 ML BY MOUTH TWICE DAILY AFTER MEALS *SWISH IN MOUTH FOR 30 SECONDS THEN SPIT OUT*, Disp: , Rfl:     fluticasone (FLONASE) 50 MCG/ACT nasal spray, 2 sprays by Each Nostril route daily, Disp: 3 Bottle, Rfl: 1    buPROPion (WELLBUTRIN XL) 150 MG extended release tablet, Take 150 mg by mouth every morning, Disp: , Rfl:     Exam:   Vitals:    10/03/22 1423   Pulse: 80   Temp: 97.7 °F (36.5 °C)   SpO2: 97%     Appears well per no apparent distress. Alert and oriented x3. Ambulates without assistance. Active trigger points bilateral trapezius today. Neck is supple nontender the midline  There are hypertonic and tender fibers noted with palpation in the paraspinal muscles of the cervical, thoracic, lumbar region. Joint fixation is noted with motion screening at C5-6, T3-6, T12-L1, L4-S1. Valdo Westbrook was seen today for back pain, neck pain and shoulder pain. Diagnoses and all orders for this visit:    Segmental and somatic dysfunction of cervical region    Segmental and somatic dysfunction of lumbar region    Segmental and somatic dysfunction of thoracic region    Cervicalgia    Chronic bilateral thoracic back pain    Chronic bilateral low back pain without sciatica    Muscle spasm of back      Treatment Plan: Continued with diversified manipulation to the affected segments today in the cervical, thoracic and lumbar regions. Tolerated well.   I will see him back in 2 weeks or as needed for further care      Seen By:  Herve Castelan

## 2022-10-14 DIAGNOSIS — K21.9 GASTROESOPHAGEAL REFLUX DISEASE WITHOUT ESOPHAGITIS: ICD-10-CM

## 2022-10-14 RX ORDER — ESOMEPRAZOLE MAGNESIUM 40 MG/1
CAPSULE, DELAYED RELEASE ORAL
Qty: 90 CAPSULE | Refills: 1 | OUTPATIENT
Start: 2022-10-14

## 2022-10-17 ENCOUNTER — OFFICE VISIT (OUTPATIENT)
Dept: CHIROPRACTIC MEDICINE | Age: 27
End: 2022-10-17
Payer: COMMERCIAL

## 2022-10-17 VITALS
HEART RATE: 90 BPM | HEIGHT: 60 IN | OXYGEN SATURATION: 98 % | WEIGHT: 131 LBS | BODY MASS INDEX: 25.72 KG/M2 | TEMPERATURE: 98.3 F

## 2022-10-17 DIAGNOSIS — M54.2 CERVICALGIA: ICD-10-CM

## 2022-10-17 DIAGNOSIS — M99.03 SEGMENTAL AND SOMATIC DYSFUNCTION OF LUMBAR REGION: ICD-10-CM

## 2022-10-17 DIAGNOSIS — G89.29 CHRONIC BILATERAL LOW BACK PAIN WITHOUT SCIATICA: ICD-10-CM

## 2022-10-17 DIAGNOSIS — M54.6 CHRONIC BILATERAL THORACIC BACK PAIN: ICD-10-CM

## 2022-10-17 DIAGNOSIS — M54.50 CHRONIC BILATERAL LOW BACK PAIN WITHOUT SCIATICA: ICD-10-CM

## 2022-10-17 DIAGNOSIS — M99.01 SEGMENTAL AND SOMATIC DYSFUNCTION OF CERVICAL REGION: Primary | ICD-10-CM

## 2022-10-17 DIAGNOSIS — G89.29 CHRONIC BILATERAL THORACIC BACK PAIN: ICD-10-CM

## 2022-10-17 DIAGNOSIS — M99.02 SEGMENTAL AND SOMATIC DYSFUNCTION OF THORACIC REGION: ICD-10-CM

## 2022-10-17 PROCEDURE — 99999 PR OFFICE/OUTPT VISIT,PROCEDURE ONLY: CPT | Performed by: CHIROPRACTOR

## 2022-10-17 PROCEDURE — 98941 CHIROPRACT MANJ 3-4 REGIONS: CPT | Performed by: CHIROPRACTOR

## 2022-10-17 NOTE — PROGRESS NOTES
Rfl: 3    triamcinolone (KENALOG) 0.1 % cream, Apply topically 2 times daily. , Disp: 80 g, Rfl: 3    testosterone cypionate (DEPOTESTOTERONE CYPIONATE) 200 MG/ML injection, Inject 50 mg into the muscle once a week., Disp: , Rfl:     chlorhexidine (PERIDEX) 0.12 % solution, TAKE 15 ML BY MOUTH TWICE DAILY AFTER MEALS *SWISH IN MOUTH FOR 30 SECONDS THEN SPIT OUT*, Disp: , Rfl:     fluticasone (FLONASE) 50 MCG/ACT nasal spray, 2 sprays by Each Nostril route daily, Disp: 3 Bottle, Rfl: 1    buPROPion (WELLBUTRIN XL) 150 MG extended release tablet, Take 150 mg by mouth every morning, Disp: , Rfl:     Exam:   Vitals:    10/17/22 1402   Pulse: 90   Temp: 98.3 °F (36.8 °C)   SpO2: 98%       There are hypertonic and tender fibers noted with palpation in the paraspinal muscles of the cervical, thoracic, lumbar region. Joint fixation is noted with motion screening at C4-6, T4-5, L4-S1. Derek Phipps was seen today for back pain. Diagnoses and all orders for this visit:    Segmental and somatic dysfunction of cervical region    Segmental and somatic dysfunction of lumbar region    Segmental and somatic dysfunction of thoracic region    Chronic bilateral low back pain without sciatica    Chronic bilateral thoracic back pain    Cervicalgia      Treatment Plan: Perform diversified manipulation to the affected cervical, thoracic and lumbar segments today. Tolerated well. With respect to the hip flexors, I did review some half kneeling hip flexor stretches for him to perform, he can use a pillow under the down knee to prevent knee pain.   The other stretch I gave him was a supine knee fall out, we will progress this to a 90/90 hip stretch      Seen By:  Jose R Lewis

## 2022-10-31 ENCOUNTER — OFFICE VISIT (OUTPATIENT)
Dept: CHIROPRACTIC MEDICINE | Age: 27
End: 2022-10-31
Payer: COMMERCIAL

## 2022-10-31 VITALS — TEMPERATURE: 98.3 F | HEART RATE: 78 BPM | OXYGEN SATURATION: 98 %

## 2022-10-31 DIAGNOSIS — M99.02 SEGMENTAL AND SOMATIC DYSFUNCTION OF THORACIC REGION: ICD-10-CM

## 2022-10-31 DIAGNOSIS — M54.2 CERVICALGIA: ICD-10-CM

## 2022-10-31 DIAGNOSIS — M54.50 CHRONIC BILATERAL LOW BACK PAIN WITHOUT SCIATICA: ICD-10-CM

## 2022-10-31 DIAGNOSIS — G89.29 CHRONIC BILATERAL THORACIC BACK PAIN: ICD-10-CM

## 2022-10-31 DIAGNOSIS — M99.01 SEGMENTAL AND SOMATIC DYSFUNCTION OF CERVICAL REGION: Primary | ICD-10-CM

## 2022-10-31 DIAGNOSIS — G89.29 CHRONIC BILATERAL LOW BACK PAIN WITHOUT SCIATICA: ICD-10-CM

## 2022-10-31 DIAGNOSIS — M99.03 SEGMENTAL AND SOMATIC DYSFUNCTION OF LUMBAR REGION: ICD-10-CM

## 2022-10-31 DIAGNOSIS — M54.6 CHRONIC BILATERAL THORACIC BACK PAIN: ICD-10-CM

## 2022-10-31 PROCEDURE — 99999 PR OFFICE/OUTPT VISIT,PROCEDURE ONLY: CPT | Performed by: CHIROPRACTOR

## 2022-10-31 PROCEDURE — 98941 CHIROPRACT MANJ 3-4 REGIONS: CPT | Performed by: CHIROPRACTOR

## 2022-10-31 NOTE — PROGRESS NOTES
10/31/22  Juventino So : 1995 Sex: adult  Age: 33 y.o. Chief Complaint   Patient presents with    Back Pain    Neck Pain       HPI:   Pain has worsened. On average, pain is perceived as severe (6-8 pain scale). Patient denies new numbness, new weakness, new tingling  Middle is tense. No new injuries. No new symptoms    Current Outpatient Medications:     esomeprazole (NEXIUM) 40 MG delayed release capsule, Take 1 tablet by mouth daily, Disp: 90 capsule, Rfl: 1    vitamin D (ERGOCALCIFEROL) 1.25 MG (00968 UT) CAPS capsule, TAKE 1 CAPSULE BY MOUTH 1 TIME A WEEK, Disp: 12 capsule, Rfl: 3    primidone (MYSOLINE) 50 MG tablet, Take 0.5 tablets by mouth every morning AND 1 tablet nightly., Disp: 135 tablet, Rfl: 3    pimecrolimus (ELIDEL) 1 % cream, APPLY TO THE AFFECTED AREA TWICE DAILY, Disp: , Rfl:     VENTOLIN  (90 Base) MCG/ACT inhaler, INHALE 2 PUFFS BY MOUTH EVERY 4 TO 6 HOURS AS NEEDED, Disp: , Rfl:     ketoconazole (NIZORAL) 2 % shampoo, WASH EVERY OTHER DAY LET SIT FOR 5 MINUTES PRIOR TO RINSING (Patient not taking: Reported on 2022), Disp: , Rfl:     lidocaine (XYLOCAINE) 2 % jelly, APPLY TO THE AFFECTED AREA EVERY DAY FOR 1 DAY PRIOR TO PELVIC EXAMS (Patient not taking: Reported on 2022), Disp: , Rfl:     nitrofurantoin (MACRODANTIN) 100 MG capsule, Take 100 mg by mouth daily, Disp: , Rfl:     methylphenidate (RITALIN) 20 MG tablet, TAKE 1 TABLET BY MOUTH EVERY DAY IN THE MORNING, Disp: , Rfl:     methylphenidate (RITALIN) 10 MG tablet, TAKE 1 TABLET BY MOUTH EVERY DAY IN THE AFTERNOON, Disp: , Rfl:     hydrOXYzine (ATARAX) 10 MG tablet, TAKE 1 TABLET BY MOUTH THREE TIMES DAILY AS NEEDED FOR ANXIETY OR PANIC ATTACKS, Disp: , Rfl:     PARoxetine (PAXIL) 10 MG tablet, Take 1 tablet by mouth daily, Disp: 30 tablet, Rfl: 3    triamcinolone (KENALOG) 0.1 % cream, Apply topically 2 times daily. , Disp: 80 g, Rfl: 3    testosterone cypionate (DEPOTESTOTERONE CYPIONATE) 200 MG/ML injection, Inject 50 mg into the muscle once a week., Disp: , Rfl:     chlorhexidine (PERIDEX) 0.12 % solution, TAKE 15 ML BY MOUTH TWICE DAILY AFTER MEALS *SWISH IN MOUTH FOR 30 SECONDS THEN SPIT OUT*, Disp: , Rfl:     fluticasone (FLONASE) 50 MCG/ACT nasal spray, 2 sprays by Each Nostril route daily, Disp: 3 Bottle, Rfl: 1    buPROPion (WELLBUTRIN XL) 150 MG extended release tablet, Take 150 mg by mouth every morning, Disp: , Rfl:     Exam:   Vitals:    10/31/22 1358   Pulse: 78   Temp: 98.3 °F (36.8 °C)   SpO2: 98%       There are hypertonic and tender fibers noted with palpation in the paraspinal muscles of the cervical, thoracic, lumbar region. Joint fixation is noted with motion screening at C4-6, T4-7, T12-L1, L4 S1. Allyn Maria was seen today for back pain and neck pain. Diagnoses and all orders for this visit:    Segmental and somatic dysfunction of cervical region    Segmental and somatic dysfunction of lumbar region    Segmental and somatic dysfunction of thoracic region    Chronic bilateral low back pain without sciatica    Chronic bilateral thoracic back pain    Cervicalgia      Treatment Plan: Continued with diversified manipulation today to listed segments in the cervical, thoracic and lumbar regions. Tolerated well. Provided him with HEP in the form of thoracic and lumbar range of motion exercises. He can try these between visits. Follow-up 2-3 weeks for continued care.       Seen By:  Edson Serrano DC

## 2022-11-15 ENCOUNTER — OFFICE VISIT (OUTPATIENT)
Dept: FAMILY MEDICINE CLINIC | Age: 27
End: 2022-11-15
Payer: COMMERCIAL

## 2022-11-15 VITALS
TEMPERATURE: 97.2 F | OXYGEN SATURATION: 99 % | RESPIRATION RATE: 17 BRPM | HEART RATE: 86 BPM | BODY MASS INDEX: 25.72 KG/M2 | SYSTOLIC BLOOD PRESSURE: 126 MMHG | DIASTOLIC BLOOD PRESSURE: 88 MMHG | WEIGHT: 131 LBS | HEIGHT: 60 IN

## 2022-11-15 DIAGNOSIS — U07.1 COVID-19: Primary | ICD-10-CM

## 2022-11-15 DIAGNOSIS — J02.9 SORE THROAT: ICD-10-CM

## 2022-11-15 DIAGNOSIS — J02.0 STREP THROAT: ICD-10-CM

## 2022-11-15 LAB
Lab: ABNORMAL
PERFORMING INSTRUMENT: ABNORMAL
QC PASS/FAIL: ABNORMAL
S PYO AG THROAT QL: POSITIVE
SARS-COV-2, POC: DETECTED

## 2022-11-15 PROCEDURE — 1036F TOBACCO NON-USER: CPT | Performed by: NURSE PRACTITIONER

## 2022-11-15 PROCEDURE — G8427 DOCREV CUR MEDS BY ELIG CLIN: HCPCS | Performed by: NURSE PRACTITIONER

## 2022-11-15 PROCEDURE — 99214 OFFICE O/P EST MOD 30 MIN: CPT | Performed by: NURSE PRACTITIONER

## 2022-11-15 PROCEDURE — 87880 STREP A ASSAY W/OPTIC: CPT | Performed by: NURSE PRACTITIONER

## 2022-11-15 PROCEDURE — G8484 FLU IMMUNIZE NO ADMIN: HCPCS | Performed by: NURSE PRACTITIONER

## 2022-11-15 PROCEDURE — 87426 SARSCOV CORONAVIRUS AG IA: CPT | Performed by: NURSE PRACTITIONER

## 2022-11-15 PROCEDURE — G8419 CALC BMI OUT NRM PARAM NOF/U: HCPCS | Performed by: NURSE PRACTITIONER

## 2022-11-15 RX ORDER — AMOXICILLIN 500 MG/1
1 TABLET, FILM COATED ORAL 2 TIMES DAILY
Qty: 20 TABLET | Refills: 0 | Status: SHIPPED | OUTPATIENT
Start: 2022-11-15 | End: 2022-11-25

## 2022-11-15 NOTE — LETTER
[address] [place] [address]  Phone: [phone]  Fax: [phone] [address], APRN - KIRBY        November 15, 2022      Patient: Charleen Quach   YOB: 1995     To Whom It May Concern: It is my medical opinion the patient should quarantine from work / school for positive COVID-19 test result. A positive COVID-19 test, needs a minimum of 5 day strict quarantine based on symptoms. The patient should quarantine through 11/16/22. If symptoms are improving or have resolved after 5 days, the patient can discontinue quarantine. However, the patient must continue to wear a mask around others for 5 additional days. If fever continues or symptoms do not improve the patient is to the quarantine for an additional 5 days. The patient is also required to be fever free for 24 hours without fever reducing medications to end quarantine. The patient does not require retesting to return to work/school. If you have any questions or concerns, please don't hesitate to call.       Sincerely,        Caleb Cintron, TASHA - CNP

## 2022-11-15 NOTE — PROGRESS NOTES
11/15/22  Shilpa Arellano : 1995 Sex: adult  Age 33 y.o. Subjective:  Chief Complaint   Patient presents with    Cough     Headache, sore throat , sinus congestion and drainage, positive at home covid        HPI:   Shilpa Arellano , 33 y.o. adult presents to the clinic for evaluation of sore throat x 4 days. The patient also reports cough and sinus drainage. The patient has taken Cold / Flu med for symptoms. The patient reports improving sinus drainage and cough with unchanged sore throat over time. The patient denies known ill exposure. The patient denies hx of COVID-19. The patient denies acute loss of taste and smell, headache, rash, and fever. The patient also denies chest pain, abdominal pain, shortness of breath, wheezing, and nausea / vomiting / diarrhea. ROS:   Unless otherwise stated in this report the patient's positive and negative responses for review of systems for constitutional, eyes, ENT, cardiovascular, respiratory, gastrointestinal, neurological, , musculoskeletal, and integument systems and related systems to the presenting problem are either stated in the history of present illness or were not pertinent or were negative for the symptoms and/or complaints related to the presenting medical problem. Positives and pertinent negatives as per HPI. All others reviewed and are negative. PMH:     Past Medical History:   Diagnosis Date    Acid reflux     ADHD     Anxiety     Depression     Tremor 2021    Vitamin D deficiency        History reviewed. No pertinent surgical history.     Family History   Problem Relation Age of Onset    Hypothyroidism Mother     High Blood Pressure Father     Depression Maternal Uncle     Asthma Paternal Aunt     High Blood Pressure Maternal Grandmother     Hypothyroidism Maternal Grandmother     Cancer Maternal Grandfather     Stroke Paternal Grandmother     Heart Attack Paternal Grandfather     Irritable Bowel Syndrome Sister Medications:     Current Outpatient Medications:     Amoxicillin 500 MG TABS, Take 1 tablet by mouth 2 times daily for 10 days, Disp: 20 tablet, Rfl: 0    esomeprazole (NEXIUM) 40 MG delayed release capsule, Take 1 tablet by mouth daily, Disp: 90 capsule, Rfl: 1    vitamin D (ERGOCALCIFEROL) 1.25 MG (21262 UT) CAPS capsule, TAKE 1 CAPSULE BY MOUTH 1 TIME A WEEK, Disp: 12 capsule, Rfl: 3    primidone (MYSOLINE) 50 MG tablet, Take 0.5 tablets by mouth every morning AND 1 tablet nightly., Disp: 135 tablet, Rfl: 3    pimecrolimus (ELIDEL) 1 % cream, APPLY TO THE AFFECTED AREA TWICE DAILY, Disp: , Rfl:     VENTOLIN  (90 Base) MCG/ACT inhaler, INHALE 2 PUFFS BY MOUTH EVERY 4 TO 6 HOURS AS NEEDED, Disp: , Rfl:     ketoconazole (NIZORAL) 2 % shampoo, , Disp: , Rfl:     lidocaine (XYLOCAINE) 2 % jelly, , Disp: , Rfl:     nitrofurantoin (MACRODANTIN) 100 MG capsule, Take 100 mg by mouth daily, Disp: , Rfl:     methylphenidate (RITALIN) 20 MG tablet, TAKE 1 TABLET BY MOUTH EVERY DAY IN THE MORNING, Disp: , Rfl:     methylphenidate (RITALIN) 10 MG tablet, TAKE 1 TABLET BY MOUTH EVERY DAY IN THE AFTERNOON, Disp: , Rfl:     hydrOXYzine (ATARAX) 10 MG tablet, TAKE 1 TABLET BY MOUTH THREE TIMES DAILY AS NEEDED FOR ANXIETY OR PANIC ATTACKS, Disp: , Rfl:     PARoxetine (PAXIL) 10 MG tablet, Take 1 tablet by mouth daily, Disp: 30 tablet, Rfl: 3    triamcinolone (KENALOG) 0.1 % cream, Apply topically 2 times daily. , Disp: 80 g, Rfl: 3    chlorhexidine (PERIDEX) 0.12 % solution, TAKE 15 ML BY MOUTH TWICE DAILY AFTER MEALS *SWISH IN MOUTH FOR 30 SECONDS THEN SPIT OUT*, Disp: , Rfl:     fluticasone (FLONASE) 50 MCG/ACT nasal spray, 2 sprays by Each Nostril route daily, Disp: 3 Bottle, Rfl: 1    buPROPion (WELLBUTRIN XL) 150 MG extended release tablet, Take 150 mg by mouth every morning, Disp: , Rfl:     testosterone cypionate (DEPOTESTOTERONE CYPIONATE) 200 MG/ML injection, Inject 50 mg into the muscle once a week., Disp: , Rfl:     Allergies:   No Known Allergies    Social History:     Social History     Tobacco Use    Smoking status: Never    Smokeless tobacco: Never   Vaping Use    Vaping Use: Never used   Substance Use Topics    Alcohol use: Never       Physical Exam:     Vitals:    11/15/22 1322   BP: 126/88   Site: Left Upper Arm   Position: Sitting   Cuff Size: Medium Adult   Pulse: 86   Resp: 17   Temp: 97.2 °F (36.2 °C)   TempSrc: Temporal   SpO2: 99%   Weight: 131 lb (59.4 kg)   Height: 5' (1.524 m)       Physical Exam (PE)    Physical Exam  Constitutional:       Appearance: Normal appearance. HENT:      Head: Normocephalic. Right Ear: Tympanic membrane, ear canal and external ear normal.      Left Ear: Tympanic membrane, ear canal and external ear normal.      Nose: Rhinorrhea present. Mouth/Throat:      Mouth: Mucous membranes are moist.      Pharynx: Oropharynx is clear. Posterior oropharyngeal erythema present. Eyes:      Pupils: Pupils are equal, round, and reactive to light. Cardiovascular:      Rate and Rhythm: Normal rate and regular rhythm. Pulses: Normal pulses. Heart sounds: Normal heart sounds. Pulmonary:      Effort: Pulmonary effort is normal.      Breath sounds: Normal breath sounds. No wheezing, rhonchi or rales. Abdominal:      General: Bowel sounds are normal.      Palpations: Abdomen is soft. Musculoskeletal:         General: Normal range of motion. Cervical back: Normal range of motion and neck supple. Lymphadenopathy:      Cervical: No cervical adenopathy. Skin:     General: Skin is warm and dry. Capillary Refill: Capillary refill takes less than 2 seconds. Neurological:      General: No focal deficit present. Mental Status: He is alert and oriented to person, place, and time.    Psychiatric:         Mood and Affect: Mood normal.         Behavior: Behavior normal.        Testing:   (All laboratory and radiology results have been personally reviewed by myself)  Labs:  Results for orders placed or performed in visit on 11/15/22   POCT COVID-19, Antigen   Result Value Ref Range    SARS-COV-2, POC Detected (A) Not Detected    Lot Number [id number]     QC Pass/Fail pass     Performing Instrument BD Veritor    POCT rapid strep A   Result Value Ref Range    Strep A Ag Positive (A) None Detected       Imaging: All Radiology results interpreted by Radiologist unless otherwise noted. No orders to display       Assessment / Plan:   The patient's vitals, allergies, medications, and past medical history have been reviewed. Lilia Stoddard was seen today for cough. Diagnoses and all orders for this visit:    COVID-19    Strep throat  -     Amoxicillin 500 MG TABS; Take 1 tablet by mouth 2 times daily for 10 days    Sore throat  -     POCT COVID-19, Antigen  -     POCT rapid strep A      - Disposition: Home    - Educational material printed for patient's review and were included in patient instructions. After Visit Summary was given to patient at the end of visit. - Advised to follow CDC guidelines. Encouraged oral fluids and rest. Discussed symptomatic treatments with patient today. The patient is to schedule a follow-up with PCP in the next 2-3 days for reevaluation. Red flag symptoms were also discussed with the patient today. If symptoms worsen the patient is to go directly to the emergency department for reevaluation and treatment. Pt verbalizes understanding and is in agreement with plan of care. All questions answered. SIGNATURE: EREN Thorpe    *NOTE: This report was transcribed using voice recognition software. Every effort was made to ensure accuracy; however, inadvertent computerized transcription errors may be present.

## 2022-11-15 NOTE — LETTER
[address] [place] [address]  Phone: [phone]  Fax: 111 [facility] TASHA Dodd CNP        November 15, 2022      Patient: Abdiaziz Mccormick   YOB: 1995     To Whom It May Concern: It is my medical opinion the patient should quarantine from work / school for positive COVID-19 test result. A positive COVID-19 test, needs a minimum of 5 day strict quarantine based on symptoms. The patient should quarantine through 11/16/22. If symptoms are improving or have resolved after 5 days, the patient can discontinue quarantine. However, the patient must continue to wear a mask around others for 5 additional days. If fever continues or symptoms do not improve the patient is to the quarantine for an additional 5 days. The patient is also required to be fever free for 24 hours without fever reducing medications to end quarantine. The patient does not require retesting to return to work/school. If you have any questions or concerns, please don't hesitate to call.       Sincerely,        TASHA Harris CNP

## 2022-12-05 ENCOUNTER — OFFICE VISIT (OUTPATIENT)
Dept: NEUROLOGY | Age: 27
End: 2022-12-05
Payer: COMMERCIAL

## 2022-12-05 VITALS
BODY MASS INDEX: 23.57 KG/M2 | HEIGHT: 63 IN | HEART RATE: 87 BPM | WEIGHT: 133 LBS | OXYGEN SATURATION: 96 % | SYSTOLIC BLOOD PRESSURE: 114 MMHG | DIASTOLIC BLOOD PRESSURE: 71 MMHG

## 2022-12-05 DIAGNOSIS — G25.1 MEDICATION-INDUCED POSTURAL TREMOR: Primary | ICD-10-CM

## 2022-12-05 DIAGNOSIS — R41.3 MEMORY DEFICIT: ICD-10-CM

## 2022-12-05 PROCEDURE — G8427 DOCREV CUR MEDS BY ELIG CLIN: HCPCS | Performed by: NURSE PRACTITIONER

## 2022-12-05 PROCEDURE — G8484 FLU IMMUNIZE NO ADMIN: HCPCS | Performed by: NURSE PRACTITIONER

## 2022-12-05 PROCEDURE — G8420 CALC BMI NORM PARAMETERS: HCPCS | Performed by: NURSE PRACTITIONER

## 2022-12-05 PROCEDURE — 1036F TOBACCO NON-USER: CPT | Performed by: NURSE PRACTITIONER

## 2022-12-05 PROCEDURE — 99214 OFFICE O/P EST MOD 30 MIN: CPT | Performed by: NURSE PRACTITIONER

## 2022-12-05 RX ORDER — PRIMIDONE 50 MG/1
TABLET ORAL
Qty: 270 TABLET | Refills: 3 | Status: SHIPPED | OUTPATIENT
Start: 2022-12-05 | End: 2023-12-12

## 2022-12-05 NOTE — PROGRESS NOTES
[address] [facility] MSN, APRN-CNP, [address], [address] [place] [address]      [phone]      Office Follow Up                              Mic Astudillo  \"Melecio\" is a 33 y.o. right handed adult identifying as male    We are following him for tremors--previously seen by Dr. Maldonado Patches    The patient presents alone and is a good historian. Jeri Christianson has not noticed any improvements in the action tremors of both hands with the increase in primidone and he continues to be bothersome. No resting tremors or gait abnormalities and no associated weakness or clumsiness. Jeri Christianson remains able to dress, feed, and write without issues. Wrist weights were investigated but there are financial restraints. Neuropsychiatric testing was not obtained. Jeri Christianson remains on Paxil, Ritalin, and Wellbutrin and has been taking Ritalin earlier in the day as recommended. Caffeine has been avoided    Medications failed: Propranolol    No chest pain or palpitations  No SOB  No vertigo, lightheadedness or loss of consciousness  No falls, tripping or stumbling  No incontinence of bowels or bladder  No itching or bruising appreciated  No numbness, tingling or focal arm/leg weakness  No speech or swallowing problems    ROS otherwise negative      Current Outpatient Medications   Medication Sig Dispense Refill    esomeprazole (NEXIUM) 40 MG delayed release capsule Take 1 tablet by mouth daily 90 capsule 1    vitamin D (ERGOCALCIFEROL) 1.25 MG (06694 UT) CAPS capsule TAKE 1 CAPSULE BY MOUTH 1 TIME A WEEK 12 capsule 3    primidone (MYSOLINE) 50 MG tablet Take 0.5 tablets by mouth every morning AND 1 tablet nightly.  135 tablet 3    pimecrolimus (ELIDEL) 1 % cream APPLY TO THE AFFECTED AREA TWICE DAILY      VENTOLIN  (90 Base) MCG/ACT inhaler INHALE 2 PUFFS BY MOUTH EVERY 4 TO 6 HOURS AS NEEDED      ketoconazole (NIZORAL) 2 % shampoo       lidocaine (XYLOCAINE) 2 % jelly nitrofurantoin (MACRODANTIN) 100 MG capsule Take 100 mg by mouth daily      methylphenidate (RITALIN) 20 MG tablet TAKE 1 TABLET BY MOUTH EVERY DAY IN THE MORNING      methylphenidate (RITALIN) 10 MG tablet TAKE 1 TABLET BY MOUTH EVERY DAY IN THE AFTERNOON      hydrOXYzine (ATARAX) 10 MG tablet TAKE 1 TABLET BY MOUTH THREE TIMES DAILY AS NEEDED FOR ANXIETY OR PANIC ATTACKS      PARoxetine (PAXIL) 10 MG tablet Take 1 tablet by mouth daily 30 tablet 3    triamcinolone (KENALOG) 0.1 % cream Apply topically 2 times daily. 80 g 3    testosterone cypionate (DEPOTESTOTERONE CYPIONATE) 200 MG/ML injection Inject 50 mg into the muscle once a week. chlorhexidine (PERIDEX) 0.12 % solution TAKE 15 ML BY MOUTH TWICE DAILY AFTER MEALS *SWISH IN MOUTH FOR 30 SECONDS THEN SPIT OUT*      fluticasone (FLONASE) 50 MCG/ACT nasal spray 2 sprays by Each Nostril route daily 3 Bottle 1    buPROPion (WELLBUTRIN XL) 150 MG extended release tablet Take 150 mg by mouth every morning       No current facility-administered medications for this visit.      Objective:     /71   Pulse 87   Ht 5' 3\" (1.6 m)   Wt 133 lb (60.3 kg)   SpO2 96%   BMI 23.56 kg/m²     General appearance: alert, appears stated age and cooperative  Head: Normocephalic, without obvious abnormality, atraumatic  Eyes: conjunctivae/corneas clear  Neck: Full range of motion without cervicalgia; no cogwheeling  Lungs: clear to auscultation bilaterally  Heart: regular rate and rhythm  Extremities: extremities normal, atraumatic, no cyanosis or edema  Pulses: 2+ and symmetric  Skin: Skin color, texture, turgor normal. No rashes or lesions       Mental Status: Alert, oriented x4    Appropriate attention/concentration  Intact fundus of knowledge  Intact memories    Speech: no dysarthria  Language: no aphasias    Cranial Nerves:  I: smell NA   II: visual acuity  NA   II: visual fields Full to confrontation   II: pupils MAYRA   III,VII: ptosis None   III,IV,VI: extraocular muscles  Full ROM   V: mastication Normal   V: facial light touch sensation  Normal   V,VII: corneal reflex     VII: facial muscle function - upper  Normal   VII: facial muscle function - lower Normal   VIII: hearing Normal   IX: soft palate elevation  Normal   IX,X: gag reflex    XI: trapezius strength  5/5   XI: sternocleidomastoid strength 5/5   XI: neck extension strength  5/5   XII: tongue strength  Normal     Motor:  5/5 throughout  Normal bulk and tone  No drift  Mild fine action tremors of both hands when outstretched  No tremors noted when holding cell phone or texting  No resting tremors or bradykinesia    Sensory:  LT normal in all limbs    Coordination:   FN, FFM, ILAN normal b/l    Gait:  Normal    DTR:   2+ throughout    No Acevedo's  No pathological reflexes    Laboratory/Radiology:     Neuropsychiatric testing pending    Assessment:     Medication induced tremor: Secondary to Ritalin, Paxil, and Wellbutrin usage--and aggravated by stress, anxiety, and suboptimal sleep. Though not ideal, the patient desires further medication adjustments to control these tremors and we will first uptitrate primidone. Beta-blockade can always be added back for combination therapy, though the patient is at risk for polypharmacy. Subjective memory problem: I find no evidence of memory decline on exam today and suspect that this is also medication induced versus related to ADHD and depression.     Plan:     -Increase primidone to 50 mg TID  -Advised trial of wrist weights  -Consider reduction of stimulants  -Reordered neuropsych testing with Dr. Stephen Oliver at [facility], stress mgmt, avoidance of caffeine    RTO in 6 mos or sooner TASHA White - CNP  1:21 PM  12/5/2022

## 2022-12-05 NOTE — LETTER
[facility] Neurology  [address]  Phone: [phone]  Fax: [phone]    TASHA Haider CNP        December 5, 2022    Jerryl Bumpers \"[id number]\" is a patient in my office with a diagnosis of medication induced tremor and will need accomodations for such.      Thanks      Sincerely,        TASHA Haider - CNP

## 2023-01-09 ENCOUNTER — OFFICE VISIT (OUTPATIENT)
Dept: CHIROPRACTIC MEDICINE | Age: 28
End: 2023-01-09
Payer: COMMERCIAL

## 2023-01-09 VITALS — HEART RATE: 90 BPM | OXYGEN SATURATION: 98 % | RESPIRATION RATE: 18 BRPM | TEMPERATURE: 97.3 F

## 2023-01-09 DIAGNOSIS — M99.01 SEGMENTAL AND SOMATIC DYSFUNCTION OF CERVICAL REGION: Primary | ICD-10-CM

## 2023-01-09 DIAGNOSIS — M99.02 SEGMENTAL AND SOMATIC DYSFUNCTION OF THORACIC REGION: ICD-10-CM

## 2023-01-09 DIAGNOSIS — M54.2 CERVICALGIA: ICD-10-CM

## 2023-01-09 DIAGNOSIS — G89.29 CHRONIC BILATERAL THORACIC BACK PAIN: ICD-10-CM

## 2023-01-09 DIAGNOSIS — M54.6 CHRONIC BILATERAL THORACIC BACK PAIN: ICD-10-CM

## 2023-01-09 DIAGNOSIS — M99.03 SEGMENTAL AND SOMATIC DYSFUNCTION OF LUMBAR REGION: ICD-10-CM

## 2023-01-09 DIAGNOSIS — G89.29 CHRONIC BILATERAL LOW BACK PAIN WITHOUT SCIATICA: ICD-10-CM

## 2023-01-09 DIAGNOSIS — M54.50 CHRONIC BILATERAL LOW BACK PAIN WITHOUT SCIATICA: ICD-10-CM

## 2023-01-09 PROCEDURE — 98941 CHIROPRACT MANJ 3-4 REGIONS: CPT | Performed by: CHIROPRACTOR

## 2023-01-09 PROCEDURE — 99999 PR OFFICE/OUTPT VISIT,PROCEDURE ONLY: CPT | Performed by: CHIROPRACTOR

## 2023-01-09 NOTE — PROGRESS NOTES
23  Kellie Montejo : 1995 Sex: adult  Age: 33 y.o. Chief Complaint   Patient presents with    Back Pain    Neck Pain       HPI:   Pain has worsened. On average, pain is perceived as moderate (4-6 pain scale). Patient denies new numbness, new weakness, new tingling  Got worse at work over the holidays - working at [employer] between shoulder blades especially. Not sure why. No UE/LE symptoms  Knees still bothersome at times - no longer doing PT      Current Outpatient Medications:     primidone (MYSOLINE) 50 MG tablet, Take 1 tablet by mouth in the morning and at bedtime for 7 days, THEN 1 tablet 3 times daily. , Disp: 270 tablet, Rfl: 3    esomeprazole (NEXIUM) 40 MG delayed release capsule, Take 1 tablet by mouth daily, Disp: 90 capsule, Rfl: 1    vitamin D (ERGOCALCIFEROL) 1.25 MG (03737 UT) CAPS capsule, TAKE 1 CAPSULE BY MOUTH 1 TIME A WEEK, Disp: 12 capsule, Rfl: 3    pimecrolimus (ELIDEL) 1 % cream, APPLY TO THE AFFECTED AREA TWICE DAILY, Disp: , Rfl:     VENTOLIN  (90 Base) MCG/ACT inhaler, INHALE 2 PUFFS BY MOUTH EVERY 4 TO 6 HOURS AS NEEDED, Disp: , Rfl:     ketoconazole (NIZORAL) 2 % shampoo, , Disp: , Rfl:     lidocaine (XYLOCAINE) 2 % jelly, , Disp: , Rfl:     nitrofurantoin (MACRODANTIN) 100 MG capsule, Take 100 mg by mouth daily, Disp: , Rfl:     methylphenidate (RITALIN) 20 MG tablet, TAKE 1 TABLET BY MOUTH EVERY DAY IN THE MORNING, Disp: , Rfl:     methylphenidate (RITALIN) 10 MG tablet, TAKE 1 TABLET BY MOUTH EVERY DAY IN THE AFTERNOON, Disp: , Rfl:     hydrOXYzine (ATARAX) 10 MG tablet, TAKE 1 TABLET BY MOUTH THREE TIMES DAILY AS NEEDED FOR ANXIETY OR PANIC ATTACKS, Disp: , Rfl:     PARoxetine (PAXIL) 10 MG tablet, Take 1 tablet by mouth daily, Disp: 30 tablet, Rfl: 3    triamcinolone (KENALOG) 0.1 % cream, Apply topically 2 times daily. , Disp: 80 g, Rfl: 3    testosterone cypionate (DEPOTESTOTERONE CYPIONATE) 200 MG/ML injection, Inject 50 mg into the muscle once a week., Disp: , Rfl:     chlorhexidine (PERIDEX) 0.12 % solution, TAKE 15 ML BY MOUTH TWICE DAILY AFTER MEALS *SWISH IN MOUTH FOR 30 SECONDS THEN SPIT OUT*, Disp: , Rfl:     fluticasone (FLONASE) 50 MCG/ACT nasal spray, 2 sprays by Each Nostril route daily, Disp: 3 Bottle, Rfl: 1    buPROPion (WELLBUTRIN XL) 150 MG extended release tablet, Take 150 mg by mouth every morning, Disp: , Rfl:     Exam:   Vitals:    01/09/23 1315   Pulse: 90   Resp: 18   Temp: 97.3 °F (36.3 °C)   SpO2: 98%     Appearance is unchanged. No apparent distress. Alert and oriented x3. No antalgia or list.  Mild limitation of cervical active range of motion in all planes. No midline pain. There are hypertonic and tender fibers noted with palpation in the paraspinal muscles of the cervical, thoracic, lumbar region. Joint fixation is noted with motion screening at C6-7, T3-7, T12 L2. Nevin Dobbs was seen today for back pain and neck pain. Diagnoses and all orders for this visit:    Segmental and somatic dysfunction of cervical region    Segmental and somatic dysfunction of lumbar region    Segmental and somatic dysfunction of thoracic region    Cervicalgia    Chronic bilateral thoracic back pain    Chronic bilateral low back pain without sciatica      Treatment Plan: Continued with vibratory massage to the thoracic, lumbar regions for 2 minutes. Then diversified manipulation to the affected segments in the cervical, thoracic and lumbar regions. Tolerated well.   I will see him back in 2-3 weeks for continued care,      Seen By:  John Fernandez

## 2023-01-30 ENCOUNTER — OFFICE VISIT (OUTPATIENT)
Dept: CHIROPRACTIC MEDICINE | Age: 28
End: 2023-01-30
Payer: COMMERCIAL

## 2023-01-30 VITALS — OXYGEN SATURATION: 98 % | HEART RATE: 95 BPM | TEMPERATURE: 98.5 F

## 2023-01-30 DIAGNOSIS — M99.03 SEGMENTAL AND SOMATIC DYSFUNCTION OF LUMBAR REGION: ICD-10-CM

## 2023-01-30 DIAGNOSIS — M54.6 CHRONIC BILATERAL THORACIC BACK PAIN: ICD-10-CM

## 2023-01-30 DIAGNOSIS — M54.50 CHRONIC BILATERAL LOW BACK PAIN WITHOUT SCIATICA: ICD-10-CM

## 2023-01-30 DIAGNOSIS — M99.02 SEGMENTAL AND SOMATIC DYSFUNCTION OF THORACIC REGION: ICD-10-CM

## 2023-01-30 DIAGNOSIS — M99.01 SEGMENTAL AND SOMATIC DYSFUNCTION OF CERVICAL REGION: Primary | ICD-10-CM

## 2023-01-30 DIAGNOSIS — G89.29 CHRONIC BILATERAL LOW BACK PAIN WITHOUT SCIATICA: ICD-10-CM

## 2023-01-30 DIAGNOSIS — M54.2 CERVICALGIA: ICD-10-CM

## 2023-01-30 DIAGNOSIS — G89.29 CHRONIC BILATERAL THORACIC BACK PAIN: ICD-10-CM

## 2023-01-30 PROCEDURE — 98941 CHIROPRACT MANJ 3-4 REGIONS: CPT | Performed by: CHIROPRACTOR

## 2023-01-30 PROCEDURE — 99999 PR OFFICE/OUTPT VISIT,PROCEDURE ONLY: CPT | Performed by: CHIROPRACTOR

## 2023-01-30 NOTE — PROGRESS NOTES
23  Alex Steen : 1995 Sex: adult  Age: 33 y.o. Chief Complaint   Patient presents with    Neck Pain    Back Pain       HPI:   Pain has worsened. On average, pain is perceived as mild (1-3  pain scale). Patient denies new numbness, new weakness, new tingling  Switched from backpack to roller for work, seems to have helped  Increased stress   Did have  2 day episode of his neck locking, had pain/difficulty turning. Slept wrong. No injury      Current Outpatient Medications:     primidone (MYSOLINE) 50 MG tablet, Take 1 tablet by mouth in the morning and at bedtime for 7 days, THEN 1 tablet 3 times daily. , Disp: 270 tablet, Rfl: 3    esomeprazole (NEXIUM) 40 MG delayed release capsule, Take 1 tablet by mouth daily, Disp: 90 capsule, Rfl: 1    vitamin D (ERGOCALCIFEROL) 1.25 MG (32104 UT) CAPS capsule, TAKE 1 CAPSULE BY MOUTH 1 TIME A WEEK, Disp: 12 capsule, Rfl: 3    pimecrolimus (ELIDEL) 1 % cream, APPLY TO THE AFFECTED AREA TWICE DAILY, Disp: , Rfl:     VENTOLIN  (90 Base) MCG/ACT inhaler, INHALE 2 PUFFS BY MOUTH EVERY 4 TO 6 HOURS AS NEEDED, Disp: , Rfl:     ketoconazole (NIZORAL) 2 % shampoo, , Disp: , Rfl:     lidocaine (XYLOCAINE) 2 % jelly, , Disp: , Rfl:     nitrofurantoin (MACRODANTIN) 100 MG capsule, Take 100 mg by mouth daily, Disp: , Rfl:     methylphenidate (RITALIN) 20 MG tablet, TAKE 1 TABLET BY MOUTH EVERY DAY IN THE MORNING, Disp: , Rfl:     methylphenidate (RITALIN) 10 MG tablet, TAKE 1 TABLET BY MOUTH EVERY DAY IN THE AFTERNOON, Disp: , Rfl:     hydrOXYzine (ATARAX) 10 MG tablet, TAKE 1 TABLET BY MOUTH THREE TIMES DAILY AS NEEDED FOR ANXIETY OR PANIC ATTACKS, Disp: , Rfl:     PARoxetine (PAXIL) 10 MG tablet, Take 1 tablet by mouth daily, Disp: 30 tablet, Rfl: 3    triamcinolone (KENALOG) 0.1 % cream, Apply topically 2 times daily. , Disp: 80 g, Rfl: 3    testosterone cypionate (DEPOTESTOTERONE CYPIONATE) 200 MG/ML injection, Inject 50 mg into the muscle once a week. , Disp: , Rfl:     chlorhexidine (PERIDEX) 0.12 % solution, TAKE 15 ML BY MOUTH TWICE DAILY AFTER MEALS *SWISH IN MOUTH FOR 30 SECONDS THEN SPIT OUT*, Disp: , Rfl:     fluticasone (FLONASE) 50 MCG/ACT nasal spray, 2 sprays by Each Nostril route daily, Disp: 3 Bottle, Rfl: 1    buPROPion (WELLBUTRIN XL) 150 MG extended release tablet, Take 150 mg by mouth every morning, Disp: , Rfl:     Exam:   Vitals:    01/30/23 1315   Pulse: 95   Temp: 98.5 °F (36.9 °C)   SpO2: 98%       There are hypertonic and tender fibers noted with palpation in the paraspinal muscles of the cervical, thoracic, lumbar region. Joint fixation is noted with motion screening at C4-6, T4-7, T12 L3. Giovanna Wang was seen today for neck pain and back pain. Diagnoses and all orders for this visit:    Segmental and somatic dysfunction of cervical region    Segmental and somatic dysfunction of lumbar region    Segmental and somatic dysfunction of thoracic region    Chronic bilateral low back pain without sciatica    Chronic bilateral thoracic back pain    Cervicalgia      Treatment Plan:  Diversified manipulation to the above-listed segments in the cervical spine, thoracic spine, and lumbar spine. He noted relief following care.   I will see him back in 2-3 weeks or as a schedule allows      Seen By:  Lizette Winters

## 2023-02-13 ENCOUNTER — OFFICE VISIT (OUTPATIENT)
Dept: CHIROPRACTIC MEDICINE | Age: 28
End: 2023-02-13
Payer: COMMERCIAL

## 2023-02-13 VITALS — HEART RATE: 102 BPM | TEMPERATURE: 97.6 F | OXYGEN SATURATION: 95 %

## 2023-02-13 DIAGNOSIS — G89.29 CHRONIC BILATERAL THORACIC BACK PAIN: ICD-10-CM

## 2023-02-13 DIAGNOSIS — M99.01 SEGMENTAL AND SOMATIC DYSFUNCTION OF CERVICAL REGION: Primary | ICD-10-CM

## 2023-02-13 DIAGNOSIS — M54.50 CHRONIC BILATERAL LOW BACK PAIN WITHOUT SCIATICA: ICD-10-CM

## 2023-02-13 DIAGNOSIS — M54.2 CERVICALGIA: ICD-10-CM

## 2023-02-13 DIAGNOSIS — G89.29 CHRONIC BILATERAL LOW BACK PAIN WITHOUT SCIATICA: ICD-10-CM

## 2023-02-13 DIAGNOSIS — M99.03 SEGMENTAL AND SOMATIC DYSFUNCTION OF LUMBAR REGION: ICD-10-CM

## 2023-02-13 DIAGNOSIS — M54.6 CHRONIC BILATERAL THORACIC BACK PAIN: ICD-10-CM

## 2023-02-13 DIAGNOSIS — M99.02 SEGMENTAL AND SOMATIC DYSFUNCTION OF THORACIC REGION: ICD-10-CM

## 2023-02-13 PROCEDURE — 99999 PR OFFICE/OUTPT VISIT,PROCEDURE ONLY: CPT | Performed by: CHIROPRACTOR

## 2023-02-13 PROCEDURE — 98941 CHIROPRACT MANJ 3-4 REGIONS: CPT | Performed by: CHIROPRACTOR

## 2023-02-13 NOTE — PROGRESS NOTES
23  Cindy Mensah : 1995 Sex: adult  Age: 33 y.o. Chief Complaint   Patient presents with    Neck Pain    Back Pain       HPI:   Pain has improved in some ways and worsened in others. No issues. Feeling more tightness throughout his back and neck today rather than true pain. On average, pain is perceived as mild (1-3  pain scale). Patient denies new numbness, new weakness, new tingling      Current Outpatient Medications:     primidone (MYSOLINE) 50 MG tablet, Take 1 tablet by mouth in the morning and at bedtime for 7 days, THEN 1 tablet 3 times daily. , Disp: 270 tablet, Rfl: 3    esomeprazole (NEXIUM) 40 MG delayed release capsule, Take 1 tablet by mouth daily, Disp: 90 capsule, Rfl: 1    vitamin D (ERGOCALCIFEROL) 1.25 MG (60761 UT) CAPS capsule, TAKE 1 CAPSULE BY MOUTH 1 TIME A WEEK, Disp: 12 capsule, Rfl: 3    pimecrolimus (ELIDEL) 1 % cream, APPLY TO THE AFFECTED AREA TWICE DAILY, Disp: , Rfl:     VENTOLIN  (90 Base) MCG/ACT inhaler, INHALE 2 PUFFS BY MOUTH EVERY 4 TO 6 HOURS AS NEEDED, Disp: , Rfl:     ketoconazole (NIZORAL) 2 % shampoo, , Disp: , Rfl:     lidocaine (XYLOCAINE) 2 % jelly, , Disp: , Rfl:     nitrofurantoin (MACRODANTIN) 100 MG capsule, Take 100 mg by mouth daily, Disp: , Rfl:     methylphenidate (RITALIN) 20 MG tablet, TAKE 1 TABLET BY MOUTH EVERY DAY IN THE MORNING, Disp: , Rfl:     methylphenidate (RITALIN) 10 MG tablet, TAKE 1 TABLET BY MOUTH EVERY DAY IN THE AFTERNOON, Disp: , Rfl:     hydrOXYzine (ATARAX) 10 MG tablet, TAKE 1 TABLET BY MOUTH THREE TIMES DAILY AS NEEDED FOR ANXIETY OR PANIC ATTACKS, Disp: , Rfl:     PARoxetine (PAXIL) 10 MG tablet, Take 1 tablet by mouth daily, Disp: 30 tablet, Rfl: 3    triamcinolone (KENALOG) 0.1 % cream, Apply topically 2 times daily. , Disp: 80 g, Rfl: 3    testosterone cypionate (DEPOTESTOTERONE CYPIONATE) 200 MG/ML injection, Inject 50 mg into the muscle once a week., Disp: , Rfl:     chlorhexidine (PERIDEX) 0.12 % solution, TAKE 15 ML BY MOUTH TWICE DAILY AFTER MEALS *SWISH IN MOUTH FOR 30 SECONDS THEN SPIT OUT*, Disp: , Rfl:     fluticasone (FLONASE) 50 MCG/ACT nasal spray, 2 sprays by Each Nostril route daily, Disp: 3 Bottle, Rfl: 1    buPROPion (WELLBUTRIN XL) 150 MG extended release tablet, Take 150 mg by mouth every morning, Disp: , Rfl:     Exam:   Vitals:    02/13/23 1422   Pulse: (!) 102   Temp: 97.6 °F (36.4 °C)   SpO2: 95%     Trigger points bilateral suboccipital group. No midline pain throughout spinal regions. There are hypertonic and tender fibers noted with palpation in the paraspinal muscles of the cervical, thoracic, lumbar region. Joint fixation is noted with motion screening at bilateral SI joints, L5-S1, T12-L1, T3-5 and C2-3. Erick Riggs was seen today for neck pain and back pain. Diagnoses and all orders for this visit:    Segmental and somatic dysfunction of cervical region    Segmental and somatic dysfunction of lumbar region    Segmental and somatic dysfunction of thoracic region    Chronic bilateral low back pain without sciatica    Chronic bilateral thoracic back pain    Cervicalgia      Treatment Plan: Continued with diversified manipulation today to listed segments in the cervical, thoracic, lumbar and SI regions. Tolerated well.   I will see him back in 2 weeks or as needed for care      Seen By:  Anjelica Umanzor